# Patient Record
Sex: MALE | Race: WHITE | Employment: OTHER | URBAN - METROPOLITAN AREA
[De-identification: names, ages, dates, MRNs, and addresses within clinical notes are randomized per-mention and may not be internally consistent; named-entity substitution may affect disease eponyms.]

---

## 2017-01-16 ENCOUNTER — CLINICAL SUPPORT (OUTPATIENT)
Dept: FAMILY MEDICINE CLINIC | Age: 67
End: 2017-01-16

## 2017-01-16 DIAGNOSIS — J44.9 CHRONIC OBSTRUCTIVE PULMONARY DISEASE, UNSPECIFIED COPD TYPE (HCC): Primary | ICD-10-CM

## 2017-02-13 DIAGNOSIS — E11.9 TYPE 2 DIABETES MELLITUS WITHOUT COMPLICATION (HCC): ICD-10-CM

## 2017-02-13 RX ORDER — METFORMIN HYDROCHLORIDE 500 MG/1
TABLET, EXTENDED RELEASE ORAL
Qty: 90 TAB | Refills: 1 | Status: SHIPPED | OUTPATIENT
Start: 2017-02-13 | End: 2017-08-16 | Stop reason: SDUPTHER

## 2017-02-27 ENCOUNTER — TELEPHONE (OUTPATIENT)
Dept: FAMILY MEDICINE CLINIC | Age: 67
End: 2017-02-27

## 2017-02-27 NOTE — TELEPHONE ENCOUNTER
Telephone call made to the patient to let him know that his PAP order of Viagra has arrived and is available for pick-up. The patient stated he will pick it up soon.  Marge Manning RN

## 2017-03-17 ENCOUNTER — CLINICAL SUPPORT (OUTPATIENT)
Dept: FAMILY MEDICINE CLINIC | Age: 67
End: 2017-03-17

## 2017-03-17 DIAGNOSIS — Z79.899 MEDICATION MANAGEMENT: Primary | ICD-10-CM

## 2017-03-21 DIAGNOSIS — M62.830 BACK SPASM: ICD-10-CM

## 2017-03-21 DIAGNOSIS — E11.9 TYPE 2 DIABETES MELLITUS WITHOUT COMPLICATION, WITHOUT LONG-TERM CURRENT USE OF INSULIN (HCC): ICD-10-CM

## 2017-03-21 DIAGNOSIS — I10 ESSENTIAL HYPERTENSION WITH GOAL BLOOD PRESSURE LESS THAN 140/90: ICD-10-CM

## 2017-03-21 RX ORDER — CYCLOBENZAPRINE HCL 10 MG
TABLET ORAL
Qty: 30 TAB | Refills: 1 | Status: SHIPPED | OUTPATIENT
Start: 2017-03-21 | End: 2017-10-25 | Stop reason: ALTCHOICE

## 2017-03-21 RX ORDER — METOPROLOL TARTRATE 50 MG/1
TABLET ORAL
Qty: 180 TAB | Refills: 1 | Status: SHIPPED | OUTPATIENT
Start: 2017-03-21 | End: 2017-12-14 | Stop reason: SDUPTHER

## 2017-03-21 RX ORDER — LOSARTAN POTASSIUM 100 MG/1
TABLET ORAL
Qty: 90 TAB | Refills: 1 | Status: SHIPPED | OUTPATIENT
Start: 2017-03-21 | End: 2017-10-25 | Stop reason: DRUGHIGH

## 2017-03-21 RX ORDER — LOVASTATIN 20 MG/1
20 TABLET ORAL
Qty: 90 TAB | Refills: 1 | Status: SHIPPED | OUTPATIENT
Start: 2017-03-21 | End: 2017-08-16 | Stop reason: SDUPTHER

## 2017-03-30 ENCOUNTER — OFFICE VISIT (OUTPATIENT)
Dept: FAMILY MEDICINE CLINIC | Age: 67
End: 2017-03-30

## 2017-03-30 VITALS
DIASTOLIC BLOOD PRESSURE: 65 MMHG | BODY MASS INDEX: 37.43 KG/M2 | HEART RATE: 73 BPM | WEIGHT: 253.6 LBS | SYSTOLIC BLOOD PRESSURE: 116 MMHG | TEMPERATURE: 97.6 F

## 2017-03-30 DIAGNOSIS — L91.8 SKIN TAG: ICD-10-CM

## 2017-03-30 DIAGNOSIS — I10 ESSENTIAL HYPERTENSION WITH GOAL BLOOD PRESSURE LESS THAN 140/90: Primary | ICD-10-CM

## 2017-03-30 DIAGNOSIS — E11.9 TYPE 2 DIABETES MELLITUS WITHOUT COMPLICATION, WITHOUT LONG-TERM CURRENT USE OF INSULIN (HCC): ICD-10-CM

## 2017-03-30 DIAGNOSIS — J44.9 CHRONIC OBSTRUCTIVE PULMONARY DISEASE, UNSPECIFIED COPD TYPE (HCC): ICD-10-CM

## 2017-03-30 DIAGNOSIS — I10 ESSENTIAL HYPERTENSION: ICD-10-CM

## 2017-03-30 DIAGNOSIS — E03.4 HYPOTHYROIDISM DUE TO ACQUIRED ATROPHY OF THYROID: ICD-10-CM

## 2017-03-30 LAB — GLUCOSE POC: NORMAL MG/DL

## 2017-03-30 RX ORDER — HYDROCHLOROTHIAZIDE 25 MG/1
25 TABLET ORAL DAILY
Qty: 180 TAB | Refills: 1 | Status: SHIPPED | OUTPATIENT
Start: 2017-03-30 | End: 2017-10-25 | Stop reason: ALTCHOICE

## 2017-03-30 NOTE — MR AVS SNAPSHOT
Visit Information Date & Time Provider Department Dept. Phone Encounter #  
 3/30/2017  3:15 PM Brock Medina MD [facility] [phone] [id number] Follow-up Instructions Return in about 3 months (around 6/30/2017). Upcoming Health Maintenance Date Due COLONOSCOPY 2/19/1969 DTaP/Tdap/Td series (1 - Tdap) 2/19/1972 ZOSTER VACCINE AGE 60> 2/19/2011 EYE EXAM RETINAL OR DILATED Q1 7/1/2014 GLAUCOMA SCREENING Q2Y 2/19/2016 FOOT EXAM Q1 10/13/2016 MICROALBUMIN Q1 4/25/2017 LIPID PANEL Q1 4/25/2017 HEMOGLOBIN A1C Q6M 6/28/2017 MEDICARE YEARLY EXAM 8/11/2017 Allergies as of 3/30/2017  Review Complete On: 3/30/2017 By: Brock Medina MD  
  
 Severity Noted Reaction Type Reactions Bactrim [Sulfamethoprim Ds]  12/03/2015    Diarrhea, Nausea and Vomiting After on 1st dose. Morphine  08/15/2011    Unknown (comments) Current Immunizations  Reviewed on 12/28/2016 Name Date Influenza Vaccine 10/22/2014, 1/24/2014, 10/1/2012 Influenza Vaccine (Quad) PF 12/28/2016, 10/22/2015 Pneumococcal Polysaccharide (PPSV-23) 12/28/2016 Pneumococcal Vaccine (Unspecified Type) 1/1/2010 Not reviewed this visit You Were Diagnosed With   
  
 Codes Comments Essential hypertension with goal blood pressure less than 140/90    -  Primary ICD-10-CM: I10 
ICD-9-CM: 401.9 Chronic obstructive pulmonary disease, unspecified COPD type ([facility]     ICD-10-CM: J44.9 ICD-9-CM: 683 Essential hypertension     ICD-10-CM: I10 
ICD-9-CM: 401.9 Type 2 diabetes mellitus without complication, without long-term current use of insulin (HCC)     ICD-10-CM: E11.9 ICD-9-CM: 250.00 Hypothyroidism due to acquired atrophy of thyroid     ICD-10-CM: E03.4 ICD-9-CM: 244.8, 246.8 Vitals BP Pulse Temp Weight(growth percentile) BMI Smoking Status  116/65 (BP 1 Location: Left arm, BP Patient Position: Sitting) 73 97.6 °F (36.4 °C) (Oral) 253 lb 9.6 oz (115 kg) 37.43 kg/m2 Former Smoker Vitals History BMI and BSA Data Body Mass Index Body Surface Area  
 37.43 kg/m 2 2.37 m 2 Preferred Pharmacy Pharmacy Name Phone [facility] [address], [address] [facility] [phone] Your Updated Medication List  
  
   
This list is accurate as of: 3/30/17  4:08 PM.  Always use your most recent med list.  
  
  
  
  
 ADVAIR DISKUS 500-50 mcg/dose diskus inhaler Generic drug:  fluticasone-salmeterol Take 1 Puff by inhalation every twelve (12) hours. albuterol 2.5 mg /3 mL (0.083 %) nebulizer solution Commonly known as:  PROVENTIL VENTOLIN  
3 mL by Nebulization route every four (4) hours as needed for Wheezing. cyclobenzaprine 10 mg tablet Commonly known as:  FLEXERIL  
TAKE ONE TABLET BY MOUTH THREE TIMES A DAY AS NEEDED FOR MUSCLE SPASMS  
  
 finasteride 5 mg tablet Commonly known as:  PROSCAR  
TAKE ONE TABLET BY MOUTH DAILY  
  
 hydroCHLOROthiazide 25 mg tablet Commonly known as:  HYDRODIURIL Take 1 Tab by mouth daily. ipratropium 0.02 % nebulizer solution Commonly known as:  ATROVENT  
2.5 mL by Nebulization route four (4) times daily as needed for Wheezing (shortness of breath). levothyroxine 88 mcg tablet Commonly known as:  SYNTHROID  
TAKE ONE TABLET BY MOUTH DAILY BEFORE BREAKFAST  
  
 losartan 100 mg tablet Commonly known as:  COZAAR  
TAKE ONE TABLET BY MOUTH DAILY lovastatin 20 mg tablet Commonly known as:  MEVACOR Take 1 Tab by mouth nightly. metFORMIN  mg tablet Commonly known as:  GLUCOPHAGE XR  
TAKE ONE TABLET BY MOUTH DAILY  
  
 metoprolol tartrate 50 mg tablet Commonly known as:  LOPRESSOR  
TAKE ONE TABLET BY MOUTH TWICE A DAY  
  
 sildenafil citrate 100 mg tablet Commonly known as:  VIAGRA Take 1 Tab by mouth as needed. SPIRIVA WITH HANDIHALER 18 mcg inhalation capsule Generic drug:  tiotropium Take 1 Cap by inhalation daily. Prescriptions Sent to Pharmacy Refills  
 hydroCHLOROthiazide (HYDRODIURIL) 25 mg tablet 1 Sig: Take 1 Tab by mouth daily. Class: Normal  
 Pharmacy: [facility], [address] [place] [address] #: [phone] Route: Oral  
  
We Performed the Following AMB POC GLUCOSE BLOOD, BY GLUCOSE MONITORING DEVICE [25682 CPT(R)] Follow-up Instructions Return in about 3 months (around 6/30/2017). Introducing [facility] & HEALTH SERVICES! Romayne Duster introduces [employer] patient portal. Now you can access parts of your medical record, email your doctor's office, and request medication refills online. 1. In your internet browser, go to [email or web address] 2. Click on the First Time User? Click Here link in the Sign In box. You will see the New Member Sign Up page. 3. Enter your [employer] Access Code exactly as it appears below. You will not need to use this code after youve completed the sign-up process. If you do not sign up before the expiration date, you must request a new code. · [employer] Access Code: [email or web address] Expires: 4/16/2017  5:10 PM 
 
4. Enter the last four digits of your Social Security Number (xxxx) and Date of Birth (mm/dd/yyyy) as indicated and click Submit. You will be taken to the next sign-up page. 5. Create a F[employer]oo ID. This will be your F[employer]oo login ID and cannot be changed, so think of one that is secure and easy to remember. 6. Create a F[employer] password. You can change your password at any time. 7. Enter your Password Reset Question and Answer. This can be used at a later time if you forget your password. 8. Enter your e-mail address. You will receive e-mail notification when new information is available in [address] 9. Click Sign Up. You can now view and download portions of your medical record. 10. Click the Download Summary menu link to download a portable copy of your medical information. If you have questions, please visit the Frequently Asked Questions section of the mPortal website. Remember, mPortal is NOT to be used for urgent needs. For medical emergencies, dial 911. Now available from your iPhone and Android! Please provide this summary of care documentation to your next provider. Your primary care clinician is listed as Kiera Lewis. If you have any questions after today's visit, please call [phone].

## 2017-03-30 NOTE — PROGRESS NOTES
Coordination of Care  1. Have you been to the ER, urgent care clinic since your last visit? Hospitalized since your last visit? No    2. Have you seen or consulted any other health care providers outside of the [address] [place] [address] since your last visit? Include any pap smears or colon screening.  No    Medications  Medication Reconciliation Performed: no  Patient does need refills     Learning Assessment Complete? yes  Results for orders placed or performed in visit on 03/30/17   AMB POC GLUCOSE BLOOD, BY GLUCOSE MONITORING DEVICE   Result Value Ref Range    Glucose  Non-fasting mg/dL

## 2017-03-30 NOTE — PROGRESS NOTES
Subjective:     Jj Mccabe is a 78 y.o. male who presents for follow up of diabetes, hypertension, and COPD. Continues to have chronic intermittent thoracic midline pain between scapulae for a long time. Does not take anything. Goes away at times. OTC meds have not helped in past.  He is not bothered by this now. His breathing is doing about the same. Not so bad lately. Has most trouble in summer. Noted a skin tag on left temple lately that he squeezed. It almost seemed to come off. Patient Active Problem List   Diagnosis Code    Obstructive sleep apnea (adult) (pediatric) G47.33    COPD (chronic obstructive pulmonary disease) ([facility] J44.9    History of ETOH abuse Z87.898    Thrombocytopenia ([facility] D69.6    Lower urinary tract symptoms (LUTS) R39.9    Hypothyroidism due to acquired atrophy of thyroid E03.4    Essential hypertension I10    Type 2 diabetes mellitus without complication, without long-term current use of insulin (HCC) E11.9       Current Outpatient Prescriptions   Medication Sig    cyclobenzaprine (FLEXERIL) 10 mg tablet TAKE ONE TABLET BY MOUTH THREE TIMES A DAY AS NEEDED FOR MUSCLE SPASMS    losartan (COZAAR) 100 mg tablet TAKE ONE TABLET BY MOUTH DAILY    lovastatin (MEVACOR) 20 mg tablet Take 1 Tab by mouth nightly.  metoprolol tartrate (LOPRESSOR) 50 mg tablet TAKE ONE TABLET BY MOUTH TWICE A DAY    metFORMIN ER (GLUCOPHAGE XR) 500 mg tablet TAKE ONE TABLET BY MOUTH DAILY    albuterol (PROVENTIL VENTOLIN) 2.5 mg /3 mL (0.083 %) nebulizer solution 3 mL by Nebulization route every four (4) hours as needed for Wheezing.  ipratropium (ATROVENT) 0.02 % nebulizer solution 2.5 mL by Nebulization route four (4) times daily as needed for Wheezing (shortness of breath).  hydrochlorothiazide (HYDRODIURIL) 25 mg tablet Take 1 Tab by mouth daily.  ibuprofen (MOTRIN) 600 mg tablet Take 1 Tab by mouth every eight (8) hours as needed for Pain.     finasteride (PROSCAR) 5 mg tablet TAKE ONE TABLET BY MOUTH DAILY    levothyroxine (SYNTHROID) 88 mcg tablet TAKE ONE TABLET BY MOUTH DAILY BEFORE BREAKFAST    sildenafil citrate (VIAGRA) 100 mg tablet Take 1 Tab by mouth as needed.  fluticasone-salmeterol (ADVAIR DISKUS) 500-50 mcg/dose diskus inhaler Take 1 Puff by inhalation every twelve (12) hours.  tiotropium (SPIRIVA WITH HANDIHALER) 18 mcg inhalation capsule Take 1 Cap by inhalation daily. No current facility-administered medications for this visit. Allergies   Allergen Reactions    Bactrim [Sulfamethoprim Ds] Diarrhea and Nausea and Vomiting     After on 1st dose.  Morphine Unknown (comments)       Diet and Lifestyle: follows a diabetic diet regularly      Glucoses:  Not checking    BP readings outside office:  Not checking    Cardiovascular ROS: taking medications as instructed, no medication side effects noted, no TIA's, no chest pain on exertion, notes stable dyspnea on exertion, no change, no swelling of ankles. Review of Systems, additional:  Pertinent items are noted in HPI.       Past Medical History:   Diagnosis Date    Blurred vision     Cirrhosis, alcoholic ([address]     COPD bronchitis     Diabetes mellitus ([address]     Ear discomfort     History of ETOH abuse     HTN (hypertension)     SOB (shortness of breath)     Thrombocytopenia (HCC)     Trouble in sleeping      Past Surgical History:   Procedure Laterality Date    HX CATARACT REMOVAL Right     HX CATARACT REMOVAL Left     HX CIRCUMCISION      at 26 y/o    HX HERNIA REPAIR  as a youth    right side    HX SHOULDER ARTHROSCOPY      left x 3 for torn rotator cuff muscle     Family History   Problem Relation Age of Onset    Diabetes Neg Hx     Hypertension Father     Heart Disease Father      pacemaker,  of MI @ 80    Hypertension Mother     Cancer Mother      breast with spread to bones     Social History   Substance Use Topics    Smoking status: Former Smoker     Types: Cigarettes     Quit date: 11/2/2014    Smokeless tobacco: Former User    Alcohol use No      Comment: Quit Oct 21, 1985          Objective:     Visit Vitals    /65 (BP 1 Location: Left arm, BP Patient Position: Sitting)    Pulse 73    Temp 97.6 °F (36.4 °C) (Oral)    Wt 253 lb 9.6 oz (115 kg)    BMI 37.43 kg/m2     Body mass index is 37.43 kg/(m^2). Physical Exam  General appearance: alert, cooperative, no distress, appears stated age    Lungs: diffuse wheezes, no rales no increased work of breathing  Heart: regular rate and rhythm, S1, S2 normal, no murmur, click, rub or gallop  Extremities: !+ edema of both lower legs. Skin: there is a necrotic skin tag on left temple in the hair which seems nearly ready to fall off. Neurologic: Alert and oriented, grossly normal exam. Uses a cane to walk. Lab results below reviewed at this visit:  Results for orders placed or performed in visit on 03/30/17   AMB POC GLUCOSE BLOOD, BY GLUCOSE MONITORING DEVICE   Result Value Ref Range    Glucose  Non-fasting mg/dL       Lab Results   Component Value Date/Time    Hemoglobin A1c 6.0 12/28/2016 11:56 AM       Lab Results   Component Value Date/Time    Sodium 142 04/25/2016 12:19 PM    Potassium 3.4 04/25/2016 12:19 PM    Chloride 101 04/25/2016 12:19 PM    CO2 24 04/25/2016 12:19 PM    Anion gap 6 04/15/2013 09:28 AM    Glucose 108 04/25/2016 12:19 PM    BUN 13 04/25/2016 12:19 PM    Creatinine 1.03 04/25/2016 12:19 PM    BUN/Creatinine ratio 13 04/25/2016 12:19 PM    GFR est AA 88 04/25/2016 12:19 PM    GFR est non-AA 76 04/25/2016 12:19 PM    Calcium 9.4 04/25/2016 12:19 PM       Lab Results   Component Value Date/Time    Microalbumin/Creat ratio (mg/g creat)  10/22/2013 02:16 PM     Cannot calculate ratio due to micro albumin result outside reportable range.     Microalbumin,urine random <0.50 10/22/2013 02:16 PM    Microalbumin, urine <3.0 04/25/2016 12:19 PM       Lab Results Component Value Date/Time    Cholesterol, total 108 04/25/2016 12:19 PM    HDL Cholesterol 49 04/25/2016 12:19 PM    LDL,Direct 73 04/15/2013 09:28 AM    LDL, calculated 42 04/25/2016 12:19 PM    VLDL, calculated 17 04/25/2016 12:19 PM    Triglyceride 86 04/25/2016 12:19 PM    CHOL/HDL Ratio 3.3 07/18/2012 10:00 AM         Assessment/Plan:       ICD-10-CM ICD-9-CM    1. Essential hypertension with goal blood pressure less than 140/90 I10 401.9 hydroCHLOROthiazide (HYDRODIURIL) 25 mg tablet   2. Chronic obstructive pulmonary disease, unspecified COPD type ([facility] J44.9 496    3. Skin tag L91.8 701.9    4. Essential hypertension I10 401.9    5. Type 2 diabetes mellitus without complication, without long-term current use of insulin (HCC) E11.9 250.00 AMB POC GLUCOSE BLOOD, BY GLUCOSE MONITORING DEVICE   6. Hypothyroidism due to acquired atrophy of thyroid E03.4 244.8      246.8        Continue same medications for now. Refill needed medication. Return for freezing of left temple skin tag if it does not fully resolve. Recommend he try compression socks for his edema. Follow-up Disposition:  Return in about 3 months (around 6/30/2017).

## 2017-04-24 ENCOUNTER — CLINICAL SUPPORT (OUTPATIENT)
Dept: FAMILY MEDICINE CLINIC | Age: 67
End: 2017-04-24

## 2017-04-24 DIAGNOSIS — Z71.9 COUNSELED BY NURSE: Primary | ICD-10-CM

## 2017-04-24 NOTE — PROGRESS NOTES
Patient seen for a Nurse Visit for PAP medication pick-up. He signed for his 3 Advair and correctly stated the dose as 1 puff every 12 hours. The patient also understands to call [facility], [facility], to request refills for the Advair.  Alyssia Guerra RN

## 2017-06-01 ENCOUNTER — CLINICAL SUPPORT (OUTPATIENT)
Dept: FAMILY MEDICINE CLINIC | Age: 67
End: 2017-06-01

## 2017-06-01 DIAGNOSIS — E11.9 TYPE 2 DIABETES MELLITUS WITHOUT COMPLICATION, WITHOUT LONG-TERM CURRENT USE OF INSULIN (HCC): ICD-10-CM

## 2017-06-01 DIAGNOSIS — J44.9 CHRONIC OBSTRUCTIVE PULMONARY DISEASE, UNSPECIFIED COPD TYPE (HCC): Primary | ICD-10-CM

## 2017-06-21 ENCOUNTER — OFFICE VISIT (OUTPATIENT)
Dept: FAMILY MEDICINE CLINIC | Age: 67
End: 2017-06-21

## 2017-06-21 VITALS
SYSTOLIC BLOOD PRESSURE: 120 MMHG | WEIGHT: 256 LBS | HEART RATE: 77 BPM | TEMPERATURE: 98.6 F | DIASTOLIC BLOOD PRESSURE: 70 MMHG | BODY MASS INDEX: 37.79 KG/M2

## 2017-06-21 DIAGNOSIS — Z11.59 NEED FOR HEPATITIS C SCREENING TEST: ICD-10-CM

## 2017-06-21 DIAGNOSIS — I10 ESSENTIAL HYPERTENSION: ICD-10-CM

## 2017-06-21 DIAGNOSIS — M54.6 CHRONIC MIDLINE THORACIC BACK PAIN: ICD-10-CM

## 2017-06-21 DIAGNOSIS — E11.9 TYPE 2 DIABETES MELLITUS WITHOUT COMPLICATION, WITHOUT LONG-TERM CURRENT USE OF INSULIN (HCC): Primary | ICD-10-CM

## 2017-06-21 DIAGNOSIS — G47.33 OBSTRUCTIVE SLEEP APNEA (ADULT) (PEDIATRIC): ICD-10-CM

## 2017-06-21 DIAGNOSIS — G89.29 CHRONIC MIDLINE THORACIC BACK PAIN: ICD-10-CM

## 2017-06-21 LAB — GLUCOSE POC: NORMAL MG/DL

## 2017-06-21 NOTE — MR AVS SNAPSHOT
Visit Information Date & Time Provider Department Dept. Phone Encounter #  
 6/21/2017  1:15 PM Ashlee Torres MD [facility] [phone] [id number] Follow-up Instructions Return in about 3 months (around 9/21/2017). Upcoming Health Maintenance Date Due COLONOSCOPY 2/19/1969 DTaP/Tdap/Td series (1 - Tdap) 2/19/1972 ZOSTER VACCINE AGE 60> 2/19/2011 EYE EXAM RETINAL OR DILATED Q1 7/1/2014 GLAUCOMA SCREENING Q2Y 2/19/2016 FOOT EXAM Q1 10/13/2016 MICROALBUMIN Q1 4/25/2017 LIPID PANEL Q1 4/25/2017 HEMOGLOBIN A1C Q6M 6/28/2017 INFLUENZA AGE 9 TO ADULT 8/1/2017 MEDICARE YEARLY EXAM 8/11/2017 Allergies as of 6/21/2017  Review Complete On: 6/21/2017 By: Ashlee Torres MD  
  
 Severity Noted Reaction Type Reactions Bactrim [Sulfamethoprim Ds]  12/03/2015    Diarrhea, Nausea and Vomiting After on 1st dose. Morphine  08/15/2011    Unknown (comments) Current Immunizations  Reviewed on 12/28/2016 Name Date Influenza Vaccine 10/22/2014, 1/24/2014, 10/1/2012 Influenza Vaccine (Quad) PF 12/28/2016, 10/22/2015 Pneumococcal Polysaccharide (PPSV-23) 12/28/2016 Pneumococcal Vaccine (Unspecified Type) 1/1/2010 Not reviewed this visit You Were Diagnosed With   
  
 Codes Comments Type 2 diabetes mellitus without complication, without long-term current use of insulin (HCC)    -  Primary ICD-10-CM: E11.9 ICD-9-CM: 250.00 Essential hypertension     ICD-10-CM: I10 
ICD-9-CM: 401.9 Chronic midline thoracic back pain     ICD-10-CM: M54.6, G89.29 ICD-9-CM: 724.1, 338.29 Obstructive sleep apnea (adult) (pediatric)     ICD-10-CM: G47.33 
ICD-9-CM: 327.23 Need for hepatitis C screening test     ICD-10-CM: Z11.59 
ICD-9-CM: V73.89 Vitals BP Pulse Temp Weight(growth percentile) BMI Smoking Status  120/70 (BP 1 Location: Left arm, BP Patient Position: Sitting) 77 98.6 °F (37 °C) (Oral) 256 lb (116.1 kg) 37.79 kg/m2 Former Smoker Vitals History BMI and BSA Data Body Mass Index Body Surface Area  
 37.79 kg/m 2 2.38 m 2 Preferred Pharmacy Pharmacy Name Phone [facility] [address] [facility], [address] [place] [address] [phone] Your Updated Medication List  
  
   
This list is accurate as of: 6/21/17  2:04 PM.  Always use your most recent med list.  
  
  
  
  
 ADVAIR DISKUS 500-50 mcg/dose diskus inhaler Generic drug:  fluticasone-salmeterol Take 1 Puff by inhalation every twelve (12) hours. albuterol 2.5 mg /3 mL (0.083 %) nebulizer solution Commonly known as:  PROVENTIL VENTOLIN  
3 mL by Nebulization route every four (4) hours as needed for Wheezing. cyclobenzaprine 10 mg tablet Commonly known as:  FLEXERIL  
TAKE ONE TABLET BY MOUTH THREE TIMES A DAY AS NEEDED FOR MUSCLE SPASMS  
  
 finasteride 5 mg tablet Commonly known as:  PROSCAR  
TAKE ONE TABLET BY MOUTH DAILY  
  
 hydroCHLOROthiazide 25 mg tablet Commonly known as:  HYDRODIURIL Take 1 Tab by mouth daily. ipratropium 0.02 % nebulizer solution Commonly known as:  ATROVENT  
2.5 mL by Nebulization route four (4) times daily as needed for Wheezing (shortness of breath). levothyroxine 88 mcg tablet Commonly known as:  SYNTHROID  
TAKE ONE TABLET BY MOUTH EVERY MORNING BEFORE BREAKFAST  
  
 losartan 100 mg tablet Commonly known as:  COZAAR  
TAKE ONE TABLET BY MOUTH DAILY lovastatin 20 mg tablet Commonly known as:  MEVACOR Take 1 Tab by mouth nightly. metFORMIN  mg tablet Commonly known as:  GLUCOPHAGE XR  
TAKE ONE TABLET BY MOUTH DAILY  
  
 metoprolol tartrate 50 mg tablet Commonly known as:  LOPRESSOR  
TAKE ONE TABLET BY MOUTH TWICE A DAY  
  
 sildenafil citrate 100 mg tablet Commonly known as:  VIAGRA Take 1 Tab by mouth as needed. SPIRIVA WITH HANDIHALER 18 mcg inhalation capsule Generic drug:  tiotropium Take 1 Cap by inhalation daily. We Performed the Following AMB POC GLUCOSE BLOOD, BY GLUCOSE MONITORING DEVICE [19994 CPT(R)] HEMOGLOBIN A1C WITH EAG [73652 CPT(R)] HEPATITIS C AB [24330 CPT(R)] METABOLIC PANEL, BASIC [80666 CPT(R)] MICROALBUMIN, UR, RAND W/ MICROALBUMIN/CREA RATIO [id number] CPT(R)] Follow-up Instructions Return in about 3 months (around 9/21/2017). To-Do List   
 06/23/2017 Imaging:  XR SPINE THORAC 2 V Introducing [facility] & HEALTH SERVICES! Sage Crouch introduces [employer] patient portal. Now you can access parts of your medical record, email your doctor's office, and request medication refills online. 1. In your internet browser, go to [email or web address] 2. Click on the First Time User? Click Here link in the Sign In box. You will see the New Member Sign Up page. 3. Enter your [employer] Access Code exactly as it appears below. You will not need to use this code after youve completed the sign-up process. If you do not sign up before the expiration date, you must request a new code. · [employer] Access Code: [email or web address] Expires: 9/19/2017  2:03 PM 
 
4. Enter the last four digits of your Social Security Number (xxxx) and Date of Birth (mm/dd/yyyy) as indicated and click Submit. You will be taken to the next sign-up page. 5. Create a [employer] ID. This will be your [employer] login ID and cannot be changed, so think of one that is secure and easy to remember. 6. Create a [employer] password. You can change your password at any time. 7. Enter your Password Reset Question and Answer. This can be used at a later time if you forget your password. 8. Enter your e-mail address. You will receive e-mail notification when new information is available in [address] 9. Click Sign Up. You can now view and download portions of your medical record. 10. Click the Download Summary menu link to download a portable copy of your medical information. If you have questions, please visit the Frequently Asked Questions section of the [employer] website. Remember, [employer] is NOT to be used for urgent needs. For medical emergencies, dial 911. Now available from your iPhone and Android! Please provide this summary of care documentation to your next provider. Your primary care clinician is listed as Mila Edwards. If you have any questions after today's visit, please call [phone].

## 2017-06-21 NOTE — PROGRESS NOTES
Subjective:     Francy Mcnally is a 78 y.o. male who presents for follow up of diabetes, hypertension, and COPD. His left temple lesion resolved. Has some other benign lesions on face which I told him we could freeze. Is upset about the ugliness of his easy bruising of skin on arms. Not on aspirin. Having a lot of pain in mid thoracic back lately. Helps him put pressure on the area. Also having cramps in both feet in early AM.  Happens frequently but not every night. Wearing his sneakers seems to help. His wife found out she had hepatitis C and she wants him to be checked. Last eye exam was about 1 year ago. Uses his CPAP machine every night which he feels helps him. Patient Active Problem List   Diagnosis Code    Obstructive sleep apnea (adult) (pediatric) G47.33    COPD (chronic obstructive pulmonary disease) ([facility] J44.9    History of ETOH abuse Z87.898    Thrombocytopenia ([facility] D69.6    Lower urinary tract symptoms (LUTS) R39.9    Hypothyroidism due to acquired atrophy of thyroid E03.4    Essential hypertension I10    Type 2 diabetes mellitus without complication, without long-term current use of insulin (HCC) E11.9       Current Outpatient Prescriptions   Medication Sig    levothyroxine (SYNTHROID) 88 mcg tablet TAKE ONE TABLET BY MOUTH EVERY MORNING BEFORE BREAKFAST    hydroCHLOROthiazide (HYDRODIURIL) 25 mg tablet Take 1 Tab by mouth daily.  cyclobenzaprine (FLEXERIL) 10 mg tablet TAKE ONE TABLET BY MOUTH THREE TIMES A DAY AS NEEDED FOR MUSCLE SPASMS    losartan (COZAAR) 100 mg tablet TAKE ONE TABLET BY MOUTH DAILY    lovastatin (MEVACOR) 20 mg tablet Take 1 Tab by mouth nightly.     metoprolol tartrate (LOPRESSOR) 50 mg tablet TAKE ONE TABLET BY MOUTH TWICE A DAY    metFORMIN ER (GLUCOPHAGE XR) 500 mg tablet TAKE ONE TABLET BY MOUTH DAILY    albuterol (PROVENTIL VENTOLIN) 2.5 mg /3 mL (0.083 %) nebulizer solution 3 mL by Nebulization route every four (4) hours as needed for Wheezing.  ipratropium (ATROVENT) 0.02 % nebulizer solution 2.5 mL by Nebulization route four (4) times daily as needed for Wheezing (shortness of breath).  finasteride (PROSCAR) 5 mg tablet TAKE ONE TABLET BY MOUTH DAILY    sildenafil citrate (VIAGRA) 100 mg tablet Take 1 Tab by mouth as needed.  fluticasone-salmeterol (ADVAIR DISKUS) 500-50 mcg/dose diskus inhaler Take 1 Puff by inhalation every twelve (12) hours.  tiotropium (SPIRIVA WITH HANDIHALER) 18 mcg inhalation capsule Take 1 Cap by inhalation daily. No current facility-administered medications for this visit. Allergies   Allergen Reactions    Bactrim [Sulfamethoprim Ds] Diarrhea and Nausea and Vomiting     After on 1st dose.  Morphine Unknown (comments)       Diet and Lifestyle: does not rigorously follow a diabetic diet      Glucoses:  Not checking    BP readings outside office:  Not checked. Cardiovascular ROS: taking medications as instructed, no medication side effects noted, no TIA's, no chest pain on exertion, notes stable dyspnea on exertion, no change, noting swelling of ankles. Review of Systems, additional:  Pertinent items are noted in HPI.       Past Surgical History:   Procedure Laterality Date    HX CATARACT REMOVAL Right 2007    HX CATARACT REMOVAL Left 2012    HX CIRCUMCISION      at 26 y/o    HX HERNIA REPAIR  as a youth    right side    HX SHOULDER ARTHROSCOPY      left x 3 for torn rotator cuff muscle     Family History   Problem Relation Age of Onset    Diabetes Neg Hx     Hypertension Father     Heart Disease Father      pacemaker,  of MI @ 80    Hypertension Mother     Cancer Mother      breast with spread to bones     Social History   Substance Use Topics    Smoking status: Former Smoker     Types: Cigarettes     Quit date: 2014    Smokeless tobacco: Former User    Alcohol use No      Comment: Quit Oct 21, 1985          Objective:     Visit Vitals    BP 120/70 (BP 1 Location: Left arm, BP Patient Position: Sitting)    Pulse 77    Temp 98.6 °F (37 °C) (Oral)    Wt 256 lb (116.1 kg)    BMI 37.79 kg/m2     Body mass index is 37.79 kg/(m^2). Physical Exam  General appearance: alert, cooperative, no distress, appears stated age    Lungs: clear to auscultation bilaterally, no wheezes, no increased work of breathing  Heart: regular rate and rhythm, S1, S2 normal, no murmur, click, rub or gallop  Extremities: extremities normal, 1+ edema of both lower legs. Back: no deformity, no spine tenderness. Skin: typical areas of superficial ecchymosis on forearms only. Skin is thin and fragile. Neurologic: Alert and oriented, grossly normal exam. Normal coordination and gait      Lab results below reviewed at this visit:  Results for orders placed or performed in visit on 06/21/17   AMB POC GLUCOSE BLOOD, BY GLUCOSE MONITORING DEVICE   Result Value Ref Range    Glucose  non fasting mg/dL   Ate potato chips before this. Lab Results   Component Value Date/Time    Hemoglobin A1c 6.0 12/28/2016 11:56 AM       Lab Results   Component Value Date/Time    Sodium 142 04/25/2016 12:19 PM    Potassium 3.4 04/25/2016 12:19 PM    Chloride 101 04/25/2016 12:19 PM    CO2 24 04/25/2016 12:19 PM    Anion gap 6 04/15/2013 09:28 AM    Glucose 108 04/25/2016 12:19 PM    BUN 13 04/25/2016 12:19 PM    Creatinine 1.03 04/25/2016 12:19 PM    BUN/Creatinine ratio 13 04/25/2016 12:19 PM    GFR est AA 88 04/25/2016 12:19 PM    GFR est non-AA 76 04/25/2016 12:19 PM    Calcium 9.4 04/25/2016 12:19 PM       Lab Results   Component Value Date/Time    Microalbumin/Creat ratio (mg/g creat)  10/22/2013 02:16 PM     Cannot calculate ratio due to micro albumin result outside reportable range.     Microalbumin,urine random <0.50 10/22/2013 02:16 PM       Lab Results   Component Value Date/Time    Cholesterol, total 108 04/25/2016 12:19 PM    HDL Cholesterol 49 04/25/2016 12:19 PM    LDL,Direct 73 04/15/2013 09:28 AM    LDL, calculated 42 04/25/2016 12:19 PM    VLDL, calculated 17 04/25/2016 12:19 PM    Triglyceride 86 04/25/2016 12:19 PM    CHOL/HDL Ratio 3.3 07/18/2012 10:00 AM         Assessment/Plan:       ICD-10-CM ICD-9-CM    1. Type 2 diabetes mellitus without complication, without long-term current use of insulin (HCC) E11.9 250.00 AMB POC GLUCOSE BLOOD, BY GLUCOSE MONITORING DEVICE      HEMOGLOBIN A1C WITH EAG      MICROALBUMIN, UR, RAND W/ MICROALBUMIN/CREA RATIO   2. Essential hypertension L29 500.8 METABOLIC PANEL, BASIC   3. Chronic midline thoracic back pain M54.6 724.1 XR SPINE THORAC 2 V    G89.29 338.29    4. Obstructive sleep apnea (adult) (pediatric) G47.33 327.23    5. Need for hepatitis C screening test Z11.59 V73.89 HEPATITIS C AB       Check lab work including his hepatitis C Ab. Call regarding results as needed. Suggested he try taking an MVI for skin. Discussed trying to get some more exercise and reduce his calories in effort to decrease weight and increase stamina. Because of his age and the somewhat atypical back pain I have asked him to obtain a thoracic spine x-ray. He is somewhat reluctant to get this done but I strongly encouraged him to have it done. Recommend he go back to his eye doctor for a diabetic exam.    Follow-up Disposition:  Return in about 3 months (around 9/21/2017).

## 2017-06-21 NOTE — PROGRESS NOTES
Coordination of Care  1. Have you been to the ER, urgent care clinic since your last visit? Hospitalized since your last visit? No    2. Have you seen or consulted any other health care providers outside of the [address] [place] [address] since your last visit? Include any pap smears or colon screening.  No    Medications  Medication Reconciliation Performed: no  Patient does not know need refills     Learning Assessment Complete? yes     Results for orders placed or performed in visit on 06/21/17   AMB POC GLUCOSE BLOOD, BY GLUCOSE MONITORING DEVICE   Result Value Ref Range    Glucose  non fasting mg/dL

## 2017-06-22 LAB
ALBUMIN/CREAT UR: <4.6 MG/G CREAT (ref 0–30)
BUN SERPL-MCNC: 16 MG/DL (ref 8–27)
BUN/CREAT SERPL: 15 (ref 10–24)
CALCIUM SERPL-MCNC: 9.5 MG/DL (ref 8.6–10.2)
CHLORIDE SERPL-SCNC: 101 MMOL/L (ref 96–106)
CO2 SERPL-SCNC: 26 MMOL/L (ref 18–29)
CREAT SERPL-MCNC: 1.07 MG/DL (ref 0.76–1.27)
CREAT UR-MCNC: 65.7 MG/DL
EST. AVERAGE GLUCOSE BLD GHB EST-MCNC: 111 MG/DL
GLUCOSE SERPL-MCNC: 108 MG/DL (ref 65–99)
HBA1C MFR BLD: 5.5 % (ref 4.8–5.6)
HCV AB S/CO SERPL IA: <0.1 S/CO RATIO (ref 0–0.9)
MICROALBUMIN UR-MCNC: <3 UG/ML
POTASSIUM SERPL-SCNC: 4 MMOL/L (ref 3.5–5.2)
SODIUM SERPL-SCNC: 141 MMOL/L (ref 134–144)

## 2017-06-22 NOTE — PROGRESS NOTES
Please inform him that his hepatitis C test is negative, his diabetes is very well controlled, and the rest of his blood work is normal.

## 2017-07-17 RX ORDER — FINASTERIDE 5 MG/1
TABLET, FILM COATED ORAL
Qty: 90 TAB | Refills: 3 | Status: SHIPPED | OUTPATIENT
Start: 2017-07-17 | End: 2017-10-25

## 2017-07-21 ENCOUNTER — HOSPITAL ENCOUNTER (OUTPATIENT)
Dept: GENERAL RADIOLOGY | Age: 67
Discharge: HOME | End: 2017-07-21
Payer: MEDICARE

## 2017-07-21 DIAGNOSIS — G89.29 CHRONIC MIDLINE THORACIC BACK PAIN: ICD-10-CM

## 2017-07-21 DIAGNOSIS — M54.6 CHRONIC MIDLINE THORACIC BACK PAIN: ICD-10-CM

## 2017-07-21 PROCEDURE — 72072 X-RAY EXAM THORAC SPINE 3VWS: CPT

## 2017-08-03 DIAGNOSIS — E03.4 HYPOTHYROIDISM DUE TO ACQUIRED ATROPHY OF THYROID: ICD-10-CM

## 2017-08-03 RX ORDER — LEVOTHYROXINE SODIUM 88 UG/1
TABLET ORAL
Qty: 30 TAB | Refills: 3 | Status: SHIPPED | OUTPATIENT
Start: 2017-08-03 | End: 2017-10-25 | Stop reason: SDUPTHER

## 2017-08-16 ENCOUNTER — OFFICE VISIT (OUTPATIENT)
Dept: FAMILY MEDICINE CLINIC | Age: 67
End: 2017-08-16

## 2017-08-16 VITALS
OXYGEN SATURATION: 96 % | DIASTOLIC BLOOD PRESSURE: 66 MMHG | WEIGHT: 252 LBS | HEART RATE: 70 BPM | TEMPERATURE: 98.2 F | HEIGHT: 69 IN | SYSTOLIC BLOOD PRESSURE: 129 MMHG | BODY MASS INDEX: 37.33 KG/M2

## 2017-08-16 DIAGNOSIS — G89.29 CHRONIC LEFT-SIDED THORACIC BACK PAIN: Primary | ICD-10-CM

## 2017-08-16 DIAGNOSIS — E11.9 TYPE 2 DIABETES MELLITUS WITHOUT COMPLICATION, WITHOUT LONG-TERM CURRENT USE OF INSULIN (HCC): ICD-10-CM

## 2017-08-16 DIAGNOSIS — M54.6 CHRONIC LEFT-SIDED THORACIC BACK PAIN: Primary | ICD-10-CM

## 2017-08-16 DIAGNOSIS — I10 ESSENTIAL HYPERTENSION: ICD-10-CM

## 2017-08-16 LAB — GLUCOSE POC: NORMAL MG/DL

## 2017-08-16 RX ORDER — METFORMIN HYDROCHLORIDE 500 MG/1
TABLET, EXTENDED RELEASE ORAL
Qty: 90 TAB | Refills: 0 | OUTPATIENT
Start: 2017-08-16

## 2017-08-16 RX ORDER — METFORMIN HYDROCHLORIDE 500 MG/1
TABLET, EXTENDED RELEASE ORAL
Qty: 90 TAB | Refills: 1 | Status: SHIPPED | OUTPATIENT
Start: 2017-08-16 | End: 2017-10-25 | Stop reason: SDUPTHER

## 2017-08-16 RX ORDER — LOVASTATIN 20 MG/1
20 TABLET ORAL
Qty: 90 TAB | Refills: 1 | Status: SHIPPED | OUTPATIENT
Start: 2017-08-16 | End: 2017-10-25 | Stop reason: SDUPTHER

## 2017-08-16 NOTE — PROGRESS NOTES
Subjective:   Jam Maurice is a 78 y.o. male with history of T2DM, HTN and COPD who presents to the clinic for chronic back pain. History provided by patient    HPI: Pt states that pain started 3 years ago. Describes pain as pressure/sharp, aggravated by movement, located in mid back and to the left side. When present, back pain is 10/10 and lasts for \"2 months. \" Currently doesn't have pain, last episode of pain was 2 weeks ago. Denied relief with NSAIDs, ICE and warm compresses. Pt states that he eats candy and brownies but his blood sugar has been in target range. No other complaints at this time. HM: Due for colonoscopy, Eye exam and Foot exam.    Current Outpatient Prescriptions on File Prior to Visit   Medication Sig Dispense Refill    levothyroxine (SYNTHROID) 88 mcg tablet TAKE ONE TABLET BY MOUTH EVERY MORNING BEFORE BREAKFAST 30 Tab 3    finasteride (PROSCAR) 5 mg tablet TAKE ONE TABLET BY MOUTH DAILY 90 Tab 3    hydroCHLOROthiazide (HYDRODIURIL) 25 mg tablet Take 1 Tab by mouth daily. 180 Tab 1    cyclobenzaprine (FLEXERIL) 10 mg tablet TAKE ONE TABLET BY MOUTH THREE TIMES A DAY AS NEEDED FOR MUSCLE SPASMS 30 Tab 1    losartan (COZAAR) 100 mg tablet TAKE ONE TABLET BY MOUTH DAILY 90 Tab 1    lovastatin (MEVACOR) 20 mg tablet Take 1 Tab by mouth nightly. 90 Tab 1    metoprolol tartrate (LOPRESSOR) 50 mg tablet TAKE ONE TABLET BY MOUTH TWICE A  Tab 1    metFORMIN ER (GLUCOPHAGE XR) 500 mg tablet TAKE ONE TABLET BY MOUTH DAILY 90 Tab 1    albuterol (PROVENTIL VENTOLIN) 2.5 mg /3 mL (0.083 %) nebulizer solution 3 mL by Nebulization route every four (4) hours as needed for Wheezing. 180 Each 5    ipratropium (ATROVENT) 0.02 % nebulizer solution 2.5 mL by Nebulization route four (4) times daily as needed for Wheezing (shortness of breath). 180 mL 5    sildenafil citrate (VIAGRA) 100 mg tablet Take 1 Tab by mouth as needed.  30 Tab 0    fluticasone-salmeterol (ADVAIR DISKUS) 500-50 mcg/dose diskus inhaler Take 1 Puff by inhalation every twelve (12) hours.  tiotropium (SPIRIVA WITH HANDIHALER) 18 mcg inhalation capsule Take 1 Cap by inhalation daily. No current facility-administered medications on file prior to visit. Patient Active Problem List   Diagnosis Code    Obstructive sleep apnea (adult) (pediatric) G47.33    COPD (chronic obstructive pulmonary disease) ([facility] [address] J44.9    History of ETOH abuse Z87.898    [facility] D69.6    Lower urinary tract symptoms (LUTS) R39.9    Hypothyroidism due to acquired atrophy of thyroid E03.4    Essential hypertension I10    Type 2 diabetes mellitus without complication, without long-term current use of insulin (HCC) E11.9       Social History     Social History    Marital status:      Spouse name: N/A    Number of children: N/A    Years of education: N/A     Occupational History    Not on file. Social History Main Topics    Smoking status: Former Smoker     Types: Cigarettes     Quit date: 11/2/2014    Smokeless tobacco: Former User    Alcohol use No      Comment: Quit Oct 21, 1985    Drug use: No    Sexual activity: Not on file     Other Topics Concern    Not on file     Social History Narrative       Review of Systems   Constitutional: Negative for chills and fever. Respiratory: Negative for cough and shortness of breath. Cardiovascular: Negative for chest pain and palpitations. Gastrointestinal: Negative for abdominal pain, diarrhea, nausea and vomiting. Musculoskeletal: Positive for back pain. Objective:     Visit Vitals    /66 (BP 1 Location: Right arm, BP Patient Position: Sitting)    Pulse 70    Temp 98.2 °F (36.8 °C) (Oral)    Ht 5' 9\" (1.753 m)    Wt 252 lb (114.3 kg)    SpO2 96%    BMI 37.21 kg/m2        Physical Exam   Constitutional: He appears well-developed and well-nourished. HENT:   Head: Normocephalic and atraumatic.    Eyes: Pupils are equal, round, and reactive to light. Cardiovascular: Normal rate and regular rhythm. Exam reveals no gallop and no friction rub. No murmur heard. Pulmonary/Chest: He has wheezes. Labored breathing, normal for patient   Psychiatric: He has a normal mood and affect. His behavior is normal.         Assessment and orders:       ICD-10-CM ICD-9-CM    1. Chronic left-sided thoracic back pain M54.6 724.1     G89.29 338.29    2. Type 2 diabetes mellitus without complication, without long-term current use of insulin (HCC) E11.9 250.00 AMB POC GLUCOSE BLOOD, BY GLUCOSE MONITORING DEVICE      metFORMIN ER (GLUCOPHAGE XR) 500 mg tablet      lovastatin (MEVACOR) 20 mg tablet   3. Essential hypertension I10 401.9      Medications refilled at this visit. No back pain currently, no treatment needed at this time. Informed Pt on X-ray results which revealed normal thoracic spinal process. Pt told to return if back pain returns. I have reviewed patient medical and social history and medications. I have reviewed pertinent labs results and other data. I have discussed the diagnosis with the patient and the intended plan as seen in the above orders. The patient has received an after-visit summary and questions were answered concerning future plans. I have discussed medication side effects and warnings with the patient as well.     Cesar Guillen MD  [facility] Resident   08/16/17    Patient discussed and seen with Dr. Nathalie Shaver, Attending Physician

## 2017-08-16 NOTE — PROGRESS NOTES
Results for orders placed or performed in visit on 08/16/17   AMB POC GLUCOSE BLOOD, BY GLUCOSE MONITORING DEVICE   Result Value Ref Range    Glucose  non fasting mg/dL     Coordination of Care  1. Have you been to the ER, urgent care clinic since your last visit? Hospitalized since your last visit? No    2. Have you seen or consulted any other health care providers outside of the [address] [place] [address] since your last visit? Include any pap smears or colon screening. No    Medications  Medication Reconciliation Performed: no  Patient does need refills     Learning Assessment Complete?  yes

## 2017-08-16 NOTE — MR AVS SNAPSHOT
Visit Information Date & Time Provider Department Dept. Phone Encounter #  
 8/16/2017  9:30 AM Nicola Stoll, [address] [place] [address] Avenue [phone] [id number] Follow-up Instructions Return in about 3 months (around 11/16/2017). Upcoming Health Maintenance Date Due COLONOSCOPY 2/19/1969 DTaP/Tdap/Td series (1 - Tdap) 2/19/1972 ZOSTER VACCINE AGE 60> 12/19/2010 EYE EXAM RETINAL OR DILATED Q1 7/1/2014 GLAUCOMA SCREENING Q2Y 2/19/2016 FOOT EXAM Q1 10/13/2016 LIPID PANEL Q1 4/25/2017 INFLUENZA AGE 9 TO ADULT 8/1/2017 MEDICARE YEARLY EXAM 8/11/2017 HEMOGLOBIN A1C Q6M 12/21/2017 MICROALBUMIN Q1 6/21/2018 Allergies as of 8/16/2017  Review Complete On: 8/16/2017 By: Noelle Mcdonald MD  
  
 Severity Noted Reaction Type Reactions Bactrim [Sulfamethoprim Ds]  12/03/2015    Diarrhea, Nausea and Vomiting After on 1st dose. Morphine  08/15/2011    Unknown (comments) Current Immunizations  Reviewed on 12/28/2016 Name Date Influenza Vaccine 10/22/2014, 1/24/2014, 10/1/2012 Influenza Vaccine (Quad) PF 12/28/2016, 10/22/2015 Pneumococcal Polysaccharide (PPSV-23) 12/28/2016 Pneumococcal Vaccine (Unspecified Type) 1/1/2010 Not reviewed this visit You Were Diagnosed With   
  
 Codes Comments Chronic left-sided thoracic back pain    -  Primary ICD-10-CM: M54.6, G89.29 ICD-9-CM: 724.1, 338.29 Type 2 diabetes mellitus without complication, without long-term current use of insulin (HCC)     ICD-10-CM: E11.9 ICD-9-CM: 250.00 Essential hypertension     ICD-10-CM: I10 
ICD-9-CM: 401.9 Vitals BP Pulse Temp Height(growth percentile) Weight(growth percentile) SpO2  
 129/66 (BP 1 Location: Right arm, BP Patient Position: Sitting) 70 98.2 °F (36.8 °C) (Oral) 5' 9\" (1.753 m) 252 lb (114.3 kg) 96% BMI Smoking Status 37.21 kg/m2 Former Smoker BMI and BSA Data Body Mass Index Body Surface Area  
 37.21 kg/m 2 2.36 m 2 Preferred Pharmacy Pharmacy Name Phone [facility] [address] [place] [address], [address] [place] [address] [phone] Your Updated Medication List  
  
   
This list is accurate as of: 8/16/17 10:45 AM.  Always use your most recent med list.  
  
  
  
  
 ADVAIR DISKUS 500-50 mcg/dose diskus inhaler Generic drug:  fluticasone-salmeterol Take 1 Puff by inhalation every twelve (12) hours. albuterol 2.5 mg /3 mL (0.083 %) nebulizer solution Commonly known as:  PROVENTIL VENTOLIN  
3 mL by Nebulization route every four (4) hours as needed for Wheezing. cyclobenzaprine 10 mg tablet Commonly known as:  FLEXERIL  
TAKE ONE TABLET BY MOUTH THREE TIMES A DAY AS NEEDED FOR MUSCLE SPASMS  
  
 finasteride 5 mg tablet Commonly known as:  PROSCAR  
TAKE ONE TABLET BY MOUTH DAILY  
  
 hydroCHLOROthiazide 25 mg tablet Commonly known as:  HYDRODIURIL Take 1 Tab by mouth daily. ipratropium 0.02 % nebulizer solution Commonly known as:  ATROVENT  
2.5 mL by Nebulization route four (4) times daily as needed for Wheezing (shortness of breath). levothyroxine 88 mcg tablet Commonly known as:  SYNTHROID  
TAKE ONE TABLET BY MOUTH EVERY MORNING BEFORE BREAKFAST  
  
 losartan 100 mg tablet Commonly known as:  COZAAR  
TAKE ONE TABLET BY MOUTH DAILY lovastatin 20 mg tablet Commonly known as:  MEVACOR Take 1 Tab by mouth nightly. metFORMIN  mg tablet Commonly known as:  GLUCOPHAGE XR  
TAKE ONE TABLET BY MOUTH DAILY  
  
 metoprolol tartrate 50 mg tablet Commonly known as:  LOPRESSOR  
TAKE ONE TABLET BY MOUTH TWICE A DAY  
  
 sildenafil citrate 100 mg tablet Commonly known as:  VIAGRA Take 1 Tab by mouth as needed. SPIRIVA WITH HANDIHALER 18 mcg inhalation capsule Generic drug:  tiotropium Take 1 Cap by inhalation daily. Prescriptions Sent to Pharmacy Refills  
 metFORMIN ER (GLUCOPHAGE XR) 500 mg tablet 1 Sig: TAKE ONE TABLET BY MOUTH DAILY Class: Normal  
 Pharmacy: [address], [address] [place] [address] Ph #: [phone]  
 lovastatin (MEVACOR) 20 mg tablet 1 Sig: Take 1 Tab by mouth nightly. Class: Normal  
 Pharmacy: [address], [address] [place] [address] Ph #: [phone] Route: Oral  
  
We Performed the Following AMB POC GLUCOSE BLOOD, BY GLUCOSE MONITORING DEVICE [52714 CPT(R)] Follow-up Instructions Return in about 3 months (around 11/16/2017). Introducing [facility]! [address] introduces [employer] patient portal. Now you can access parts of your medical record, email your doctor's office, and request medication refills online. 1. In your internet browser, go to [email or web address] 2. Click on the First Time User? Click Here link in the Sign In box. You will see the New Member Sign Up page. 3. Enter your [employer] Access Code exactly as it appears below. You will not need to use this code after youve completed the sign-up process. If you do not sign up before the expiration date, you must request a new code. · [employer] Access Code: [email or web address] Expires: 9/19/2017  2:03 PM 
 
4. Enter the last four digits of your Social Security Number (xxxx) and Date of Birth (mm/dd/yyyy) as indicated and click Submit. You will be taken to the next sign-up page. 5. Create a [employer] ID. This will be your [employer] login ID and cannot be changed, so think of one that is secure and easy to remember. 6. Create a [employer] password. You can change your password at any time. 7. Enter your Password Reset Question and Answer. This can be used at a later time if you forget your password. 8. Enter your e-mail address. You will receive e-mail notification when new information is available in [address] 9. Click Sign Up. You can now view and download portions of your medical record. 10. Click the Download Summary menu link to download a portable copy of your medical information. If you have questions, please visit the Frequently Asked Questions section of the [employer] website. Remember, [employer] is NOT to be used for urgent needs. For medical emergencies, dial 911. Now available from your iPhone and Android! Please provide this summary of care documentation to your next provider. Your primary care clinician is listed as Ignacio Flowers. If you have any questions after today's visit, please call [phone].

## 2017-09-01 ENCOUNTER — CLINICAL SUPPORT (OUTPATIENT)
Dept: FAMILY MEDICINE CLINIC | Age: 67
End: 2017-09-01

## 2017-09-01 DIAGNOSIS — Z71.9 COUNSELED BY NURSE: Primary | ICD-10-CM

## 2017-10-25 ENCOUNTER — HOSPITAL ENCOUNTER (OUTPATIENT)
Dept: LAB | Age: 67
Discharge: HOME | End: 2017-10-25

## 2017-10-25 ENCOUNTER — OFFICE VISIT (OUTPATIENT)
Dept: FAMILY MEDICINE CLINIC | Age: 67
End: 2017-10-25

## 2017-10-25 VITALS
WEIGHT: 253.2 LBS | TEMPERATURE: 97.6 F | SYSTOLIC BLOOD PRESSURE: 144 MMHG | BODY MASS INDEX: 37.39 KG/M2 | HEART RATE: 68 BPM | DIASTOLIC BLOOD PRESSURE: 74 MMHG

## 2017-10-25 DIAGNOSIS — N40.1 BENIGN PROSTATIC HYPERPLASIA WITH URINARY FREQUENCY: ICD-10-CM

## 2017-10-25 DIAGNOSIS — R35.0 BENIGN PROSTATIC HYPERPLASIA WITH URINARY FREQUENCY: ICD-10-CM

## 2017-10-25 DIAGNOSIS — E03.4 HYPOTHYROIDISM DUE TO ACQUIRED ATROPHY OF THYROID: ICD-10-CM

## 2017-10-25 DIAGNOSIS — Z13.9 ENCOUNTER FOR SCREENING: ICD-10-CM

## 2017-10-25 DIAGNOSIS — I10 ESSENTIAL HYPERTENSION: ICD-10-CM

## 2017-10-25 DIAGNOSIS — J44.9 CHRONIC OBSTRUCTIVE PULMONARY DISEASE, UNSPECIFIED COPD TYPE (HCC): ICD-10-CM

## 2017-10-25 DIAGNOSIS — Z23 ENCOUNTER FOR IMMUNIZATION: ICD-10-CM

## 2017-10-25 DIAGNOSIS — E11.9 TYPE 2 DIABETES MELLITUS WITHOUT COMPLICATION, WITHOUT LONG-TERM CURRENT USE OF INSULIN (HCC): ICD-10-CM

## 2017-10-25 DIAGNOSIS — I10 ESSENTIAL HYPERTENSION: Primary | ICD-10-CM

## 2017-10-25 LAB
ALBUMIN SERPL-MCNC: 3 G/DL (ref 3.5–5)
ALBUMIN/GLOB SERPL: 0.7 {RATIO} (ref 1.1–2.2)
ALP SERPL-CCNC: 167 U/L (ref 45–117)
ALT SERPL-CCNC: 33 U/L (ref 12–78)
ANION GAP SERPL CALC-SCNC: 6 MMOL/L (ref 5–15)
APPEARANCE UR: CLEAR
AST SERPL-CCNC: 38 U/L (ref 15–37)
BASOPHILS # BLD: 0 K/UL (ref 0–0.1)
BASOPHILS NFR BLD: 0 % (ref 0–1)
BILIRUB SERPL-MCNC: 1.2 MG/DL (ref 0.2–1)
BILIRUB UR QL: NEGATIVE
BUN SERPL-MCNC: 17 MG/DL (ref 6–20)
BUN/CREAT SERPL: 13 (ref 12–20)
CALCIUM SERPL-MCNC: 9.3 MG/DL (ref 8.5–10.1)
CHLORIDE SERPL-SCNC: 106 MMOL/L (ref 97–108)
CHOLEST SERPL-MCNC: 111 MG/DL
CO2 SERPL-SCNC: 30 MMOL/L (ref 21–32)
COLOR UR: NORMAL
CREAT SERPL-MCNC: 1.29 MG/DL (ref 0.7–1.3)
EOSINOPHIL # BLD: 0.1 K/UL (ref 0–0.4)
EOSINOPHIL NFR BLD: 2 % (ref 0–7)
ERYTHROCYTE [DISTWIDTH] IN BLOOD BY AUTOMATED COUNT: 14.5 % (ref 11.5–14.5)
GLOBULIN SER CALC-MCNC: 4.2 G/DL (ref 2–4)
GLUCOSE POC: NORMAL MG/DL
GLUCOSE SERPL-MCNC: 100 MG/DL (ref 65–100)
GLUCOSE UR STRIP.AUTO-MCNC: NEGATIVE MG/DL
HCT VFR BLD AUTO: 41.6 % (ref 36.6–50.3)
HDLC SERPL-MCNC: 60 MG/DL
HDLC SERPL: 1.9 {RATIO} (ref 0–5)
HGB BLD-MCNC: 14.2 G/DL (ref 12.1–17)
HGB UR QL STRIP: NEGATIVE
KETONES UR QL STRIP.AUTO: NEGATIVE MG/DL
LDLC SERPL CALC-MCNC: 35.4 MG/DL (ref 0–100)
LEUKOCYTE ESTERASE UR QL STRIP.AUTO: NEGATIVE
LIPID PROFILE,FLP: NORMAL
LYMPHOCYTES # BLD: 1.2 K/UL (ref 0.8–3.5)
LYMPHOCYTES NFR BLD: 21 % (ref 12–49)
MCH RBC QN AUTO: 34.9 PG (ref 26–34)
MCHC RBC AUTO-ENTMCNC: 34.1 G/DL (ref 30–36.5)
MCV RBC AUTO: 102.2 FL (ref 80–99)
MONOCYTES # BLD: 0.6 K/UL (ref 0–1)
MONOCYTES NFR BLD: 11 % (ref 5–13)
NEUTS SEG # BLD: 3.7 K/UL (ref 1.8–8)
NEUTS SEG NFR BLD: 66 % (ref 32–75)
NITRITE UR QL STRIP.AUTO: NEGATIVE
PH UR STRIP: 7 [PH] (ref 5–8)
PLATELET # BLD AUTO: 101 K/UL (ref 150–400)
POTASSIUM SERPL-SCNC: 3.5 MMOL/L (ref 3.5–5.1)
PROT SERPL-MCNC: 7.2 G/DL (ref 6.4–8.2)
PROT UR STRIP-MCNC: NEGATIVE MG/DL
RBC # BLD AUTO: 4.07 M/UL (ref 4.1–5.7)
SODIUM SERPL-SCNC: 142 MMOL/L (ref 136–145)
SP GR UR REFRACTOMETRY: 1.01 (ref 1–1.03)
TRIGL SERPL-MCNC: 78 MG/DL (ref ?–150)
TSH SERPL DL<=0.05 MIU/L-ACNC: 0.57 UIU/ML (ref 0.36–3.74)
UROBILINOGEN UR QL STRIP.AUTO: 1 EU/DL (ref 0.2–1)
VLDLC SERPL CALC-MCNC: 15.6 MG/DL
WBC # BLD AUTO: 5.6 K/UL (ref 4.1–11.1)

## 2017-10-25 PROCEDURE — 81003 URINALYSIS AUTO W/O SCOPE: CPT | Performed by: NURSE PRACTITIONER

## 2017-10-25 PROCEDURE — 80053 COMPREHEN METABOLIC PANEL: CPT | Performed by: NURSE PRACTITIONER

## 2017-10-25 PROCEDURE — 85025 COMPLETE CBC W/AUTO DIFF WBC: CPT | Performed by: NURSE PRACTITIONER

## 2017-10-25 PROCEDURE — 80061 LIPID PANEL: CPT | Performed by: NURSE PRACTITIONER

## 2017-10-25 PROCEDURE — 84153 ASSAY OF PSA TOTAL: CPT | Performed by: NURSE PRACTITIONER

## 2017-10-25 PROCEDURE — 84443 ASSAY THYROID STIM HORMONE: CPT | Performed by: NURSE PRACTITIONER

## 2017-10-25 RX ORDER — LOSARTAN POTASSIUM AND HYDROCHLOROTHIAZIDE 25; 100 MG/1; MG/1
1 TABLET ORAL DAILY
Qty: 90 TAB | Refills: 3 | Status: SHIPPED | OUTPATIENT
Start: 2017-10-25 | End: 2018-04-02 | Stop reason: SINTOL

## 2017-10-25 RX ORDER — TERAZOSIN 1 MG/1
1 CAPSULE ORAL
Qty: 30 CAP | Refills: 3 | Status: SHIPPED | OUTPATIENT
Start: 2017-10-25 | End: 2018-03-02 | Stop reason: SDUPTHER

## 2017-10-25 RX ORDER — METFORMIN HYDROCHLORIDE 500 MG/1
TABLET, EXTENDED RELEASE ORAL
Qty: 90 TAB | Refills: 1 | Status: SHIPPED | OUTPATIENT
Start: 2017-10-25 | End: 2018-05-08 | Stop reason: ALTCHOICE

## 2017-10-25 RX ORDER — LEVOTHYROXINE SODIUM 88 UG/1
TABLET ORAL
Qty: 90 TAB | Refills: 3 | Status: SHIPPED | OUTPATIENT
Start: 2017-10-25 | End: 2018-09-10 | Stop reason: SDUPTHER

## 2017-10-25 RX ORDER — LOVASTATIN 20 MG/1
20 TABLET ORAL
Qty: 90 TAB | Refills: 1 | Status: SHIPPED | OUTPATIENT
Start: 2017-10-25 | End: 2018-09-10 | Stop reason: SDUPTHER

## 2017-10-25 NOTE — PATIENT INSTRUCTIONS
Terazosin - (By mouth)   Why this medicine is used:   Treats high blood pressure and enlarged prostate. Contact a nurse or doctor right away if you have:  · Fast or pounding heartbeat  · Swelling in the hands, ankles, or feet; rapid weight gain  · Prolonged or painful erection  · Lightheadedness, fainting     Common side effects:  · Tiredness  · Nausea  · Runny or stuffy nose  © 2017 [address] Information is for End User's use only and may not be sold, redistributed or otherwise used for commercial purposes.

## 2017-10-25 NOTE — MR AVS SNAPSHOT
Visit Information Date & Time Provider Department Dept. Phone Encounter #  
 10/25/2017  9:30 AM Bryson SanchezTolu [facility] [phone] [id number] Follow-up Instructions Return in about 6 weeks (around 12/6/2017). Upcoming Health Maintenance Date Due COLONOSCOPY 2/19/1969 DTaP/Tdap/Td series (1 - Tdap) 2/19/1972 ZOSTER VACCINE AGE 60> 12/19/2010 EYE EXAM RETINAL OR DILATED Q1 7/1/2014 GLAUCOMA SCREENING Q2Y 2/19/2016 FOOT EXAM Q1 10/13/2016 LIPID PANEL Q1 4/25/2017 INFLUENZA AGE 9 TO ADULT 8/1/2017 MEDICARE YEARLY EXAM 8/11/2017 HEMOGLOBIN A1C Q6M 12/21/2017 MICROALBUMIN Q1 6/21/2018 Allergies as of 10/25/2017  Review Complete On: 10/25/2017 By: Bryson Sanchez NP Severity Noted Reaction Type Reactions Bactrim [Sulfamethoprim Ds]  12/03/2015    Diarrhea, Nausea and Vomiting After on 1st dose. Morphine  08/15/2011    Unknown (comments) Current Immunizations  Reviewed on 12/28/2016 Name Date Influenza Vaccine 10/22/2014, 1/24/2014, 10/1/2012 Influenza Vaccine (Quad) PF 12/28/2016, 10/22/2015 Pneumococcal Polysaccharide (PPSV-23) 12/28/2016 Pneumococcal Vaccine (Unspecified Type) 1/1/2010 Not reviewed this visit You Were Diagnosed With   
  
 Codes Comments Essential hypertension    -  Primary ICD-10-CM: I10 
ICD-9-CM: 401.9 Encounter for screening     ICD-10-CM: Z13.9 ICD-9-CM: V82.9 Hypothyroidism due to acquired atrophy of thyroid     ICD-10-CM: E03.4 ICD-9-CM: 244.8, 246.8 Type 2 diabetes mellitus without complication, without long-term current use of insulin (HCC)     ICD-10-CM: E11.9 ICD-9-CM: 250.00 Chronic obstructive pulmonary disease, unspecified COPD type ([facility]     ICD-10-CM: J44.9 ICD-9-CM: 668 Benign prostatic hyperplasia with urinary frequency     ICD-10-CM: N40.1, R35.0 ICD-9-CM: 600.01, 788.41 Vitals BP Pulse Temp Weight(growth percentile) BMI Smoking Status 144/74 (BP 1 Location: Left arm, BP Patient Position: Sitting) 68 97.6 °F (36.4 °C) (Oral) 253 lb 3.2 oz (114.9 kg) 37.39 kg/m2 Former Smoker Vitals History BMI and BSA Data Body Mass Index Body Surface Area  
 37.39 kg/m 2 2.37 m 2 Preferred Pharmacy Pharmacy Name Phone [facility] [address] [place] [address] [phone] Your Updated Medication List  
  
   
This list is accurate as of: 10/25/17 10:28 AM.  Always use your most recent med list.  
  
  
  
  
 ADVAIR DISKUS 500-50 mcg/dose diskus inhaler Generic drug:  fluticasone-salmeterol Take 1 Puff by inhalation every twelve (12) hours. albuterol 2.5 mg /3 mL (0.083 %) nebulizer solution Commonly known as:  PROVENTIL VENTOLIN  
3 mL by Nebulization route every four (4) hours as needed for Wheezing. ipratropium 0.02 % Soln Commonly known as:  ATROVENT  
2.5 mL by Nebulization route four (4) times daily as needed for Wheezing (shortness of breath). levothyroxine 88 mcg tablet Commonly known as:  SYNTHROID  
TAKE ONE TABLET BY MOUTH EVERY MORNING BEFORE BREAKFAST  
  
 losartan-hydroCHLOROthiazide 100-25 mg per tablet Commonly known as:  HYZAAR Take 1 Tab by mouth daily. lovastatin 20 mg tablet Commonly known as:  MEVACOR Take 1 Tab by mouth nightly. metFORMIN  mg tablet Commonly known as:  GLUCOPHAGE XR  
TAKE ONE TABLET BY MOUTH DAILY  
  
 metoprolol tartrate 50 mg tablet Commonly known as:  LOPRESSOR  
TAKE ONE TABLET BY MOUTH TWICE A DAY SPIRIVA WITH HANDIHALER 18 mcg inhalation capsule Generic drug:  tiotropium Take 1 Cap by inhalation daily. terazosin 1 mg capsule Commonly known as:  HYTRIN Take 1 Cap by mouth nightly. Prescriptions Sent to Pharmacy  Refills  
 metFORMIN ER (GLUCOPHAGE XR) 500 mg tablet 1  
 Sig: TAKE ONE TABLET BY MOUTH DAILY Class: Normal  
 Pharmacy: [address] Ph #: [phone]  
 lovastatin (MEVACOR) 20 mg tablet 1 Sig: Take 1 Tab by mouth nightly. Class: Normal  
 Pharmacy: [address] Ph #: [phone] Route: Oral  
 levothyroxine (SYNTHROID) 88 mcg tablet 3 Sig: TAKE ONE TABLET BY MOUTH EVERY MORNING BEFORE BREAKFAST Class: Normal  
 Pharmacy: [address] Ph #: [phone]  
 terazosin (HYTRIN) 1 mg capsule 3 Sig: Take 1 Cap by mouth nightly. Class: Normal  
 Pharmacy: [address] Ph #: [phone] Route: Oral  
 losartan-hydroCHLOROthiazide (HYZAAR) 100-25 mg per tablet 3 Sig: Take 1 Tab by mouth daily. Class: Normal  
 Pharmacy: [address] Ph #: [phone] Route: Oral  
  
We Performed the Following AMB POC GLUCOSE BLOOD, BY GLUCOSE MONITORING DEVICE [43024 CPT(R)] Follow-up Instructions Return in about 6 weeks (around 12/6/2017). Patient Instructions Terazosin - (By mouth) Why this medicine is used:  
Treats high blood pressure and enlarged prostate. Contact a nurse or doctor right away if you have: 
· Fast or pounding heartbeat · Swelling in the hands, ankles, or feet; rapid weight gain · Prolonged or painful erection · Lightheadedness, fainting Common side effects: · Tiredness · Nausea · Runny or stuffy nose © 2017 [facility] Information is for End User's use only and may not be sold, redistributed or otherwise used for commercial purposes. Introducing [facility] SERVICES! [employer] introduces [employer] patient portal. Now you can access parts of your medical record, email your doctor's office, and request medication refills online. 1. In your internet browser, go to [email or web address] 2. Click on the First Time User? Click Here link in the Sign In box. You will see the New Member Sign Up page. 3. Enter your [employer] Access Code exactly as it appears below. You will not need to use this code after youve completed the sign-up process. If you do not sign up before the expiration date, you must request a new code. · [employer] Access Code: [email or web address] Expires: 1/23/2018 10:28 AM 
 
4. Enter the last four digits of your Social Security Number (xxxx) and Date of Birth (mm/dd/yyyy) as indicated and click Submit. You will be taken to the next sign-up page. 5. Create a Te[employer] ID. This will be your [employer] login ID and cannot be changed, so think of one that is secure and easy to remember. 6. Create a [employer] password. You can change your password at any time. 7. Enter your Password Reset Question and Answer. This can be used at a later time if you forget your password. 8. Enter your e-mail address. You will receive e-mail notification when new information is available in [address] 9. Click Sign Up. You can now view and download portions of your medical record. 10. Click the Download Summary menu link to download a portable copy of your medical information. If you have questions, please visit the Frequently Asked Questions section of the [employer] website. Remember, [employer] is NOT to be used for urgent needs. For medical emergencies, dial 911. Now available from your iPhone and Android! Please provide this summary of care documentation to your next provider. Your primary care clinician is listed as Vel Gaxiola. If you have any questions after today's visit, please call [phone].

## 2017-10-25 NOTE — PROGRESS NOTES
Christen Taylor completed screening documentation. No contraindications for administering today's ordered vaccines. Vaccine Immunization Statement(s) given and instructions for adverse reaction. No adverse reaction noted at time of discharge, explained possible s/e. Reviewed symptoms indicating need to be seen in ER. Patient given flu vaccine; denies fever. Pt had no adverse reaction at time of d/c. Vaccine administration documented into [place] Immunization Information System.

## 2017-10-25 NOTE — PROGRESS NOTES
Subjective:     Chief Complaint   Patient presents with    Follow-up    Skin Exam        He  is a 78 y.o. male who presents for routine follow-up. Since, Pt reports overall good and no new concerning S&S. Has noted some seasonal congestion/URI. No associated SOB, cough, fevers/chills, throat pain nor increased PERRY. Notes poor response to Proscar, wakes up 3-4x night to urinate. No other attempted agents/Rx nor OTC meds. Does not have Rx coverage through Medicare. Is requesting flu vaccine today. Is not sure what Rx he needs refills on at this time. ROS  Gen - no fever/chills  Resp - no dyspnea or cough  CV - no chest pain or PERRY  Rest per HPI    Past Medical History:   Diagnosis Date    Blurred vision     Cirrhosis, alcoholic (HCC)     COPD bronchitis     Diabetes mellitus ([address]     Ear discomfort     History of ETOH abuse     HTN (hypertension)     SOB (shortness of breath)     Thrombocytopenia ([address]     Trouble in sleeping      Past Surgical History:   Procedure Laterality Date    HX CATARACT REMOVAL Right 2007    HX CATARACT REMOVAL Left 2012    HX CIRCUMCISION      at 26 y/o    HX HERNIA REPAIR  as a youth    right side    HX SHOULDER ARTHROSCOPY      left x 3 for torn rotator cuff muscle     Current Outpatient Prescriptions on File Prior to Visit   Medication Sig Dispense Refill    metFORMIN ER (GLUCOPHAGE XR) 500 mg tablet TAKE ONE TABLET BY MOUTH DAILY 90 Tab 1    lovastatin (MEVACOR) 20 mg tablet Take 1 Tab by mouth nightly. 90 Tab 1    levothyroxine (SYNTHROID) 88 mcg tablet TAKE ONE TABLET BY MOUTH EVERY MORNING BEFORE BREAKFAST 30 Tab 3    finasteride (PROSCAR) 5 mg tablet TAKE ONE TABLET BY MOUTH DAILY 90 Tab 3    hydroCHLOROthiazide (HYDRODIURIL) 25 mg tablet Take 1 Tab by mouth daily.  180 Tab 1    cyclobenzaprine (FLEXERIL) 10 mg tablet TAKE ONE TABLET BY MOUTH THREE TIMES A DAY AS NEEDED FOR MUSCLE SPASMS 30 Tab 1    losartan (COZAAR) 100 mg tablet TAKE ONE TABLET BY MOUTH DAILY 90 Tab 1    metoprolol tartrate (LOPRESSOR) 50 mg tablet TAKE ONE TABLET BY MOUTH TWICE A  Tab 1    albuterol (PROVENTIL VENTOLIN) 2.5 mg /3 mL (0.083 %) nebulizer solution 3 mL by Nebulization route every four (4) hours as needed for Wheezing. 180 Each 5    ipratropium (ATROVENT) 0.02 % nebulizer solution 2.5 mL by Nebulization route four (4) times daily as needed for Wheezing (shortness of breath). 180 mL 5    sildenafil citrate (VIAGRA) 100 mg tablet Take 1 Tab by mouth as needed. 30 Tab 0    fluticasone-salmeterol (ADVAIR DISKUS) 500-50 mcg/dose diskus inhaler Take 1 Puff by inhalation every twelve (12) hours.  tiotropium (SPIRIVA WITH HANDIHALER) 18 mcg inhalation capsule Take 1 Cap by inhalation daily. No current facility-administered medications on file prior to visit. Objective:     Vitals:    10/25/17 0934   BP: 144/74   Pulse: 68   Temp: 97.6 °F (36.4 °C)   TempSrc: Oral   Weight: 253 lb 3.2 oz (114.9 kg)       Physical Examination:  General appearance - alert, well appearing, and in no distress  Eyes -sclera anicteric  Neck - supple, no significant adenopathy, no thyromegaly  Chest - clear to auscultation, no wheezes, rales or rhonchi, symmetric air entry  Heart - normal rate, regular rhythm, normal S1, S2, no murmurs, rubs, clicks or gallops  Neurological - alert, oriented, no focal findings or movement disorder noted  Abdomen-BS present/WNL x 4 quads, non-tender/distended, soft,no organomegaly    HENT unremarkable. Recent Results (from the past 12 hour(s))   AMB POC GLUCOSE BLOOD, BY GLUCOSE MONITORING DEVICE    Collection Time: 10/25/17  9:38 AM   Result Value Ref Range    Glucose  NF mg/dL       Assessment/ Plan:   Diagnoses and all orders for this visit:    1. Essential hypertension  -     METABOLIC PANEL, COMPREHENSIVE; Future  -     URINALYSIS W/ RFLX MICROSCOPIC;  Future  -     losartan-hydroCHLOROthiazide (HYZAAR) 100-25 mg per tablet; Take 1 Tab by mouth daily.  -     LIPID PANEL; Future    2. Encounter for screening  -     AMB POC GLUCOSE BLOOD, BY GLUCOSE MONITORING DEVICE    3. Hypothyroidism due to acquired atrophy of thyroid  -     levothyroxine (SYNTHROID) 88 mcg tablet; TAKE ONE TABLET BY MOUTH EVERY MORNING BEFORE BREAKFAST  -     TSH 3RD GENERATION; Future  -     LIPID PANEL; Future    4. Type 2 diabetes mellitus without complication, without long-term current use of insulin (HCC)  -     metFORMIN ER (GLUCOPHAGE XR) 500 mg tablet; TAKE ONE TABLET BY MOUTH DAILY  -     lovastatin (MEVACOR) 20 mg tablet; Take 1 Tab by mouth nightly. 5. Chronic obstructive pulmonary disease, unspecified COPD type ([facility]    6. Benign prostatic hyperplasia with urinary frequency  -     terazosin (HYTRIN) 1 mg capsule; Take 1 Cap by mouth nightly. -     CBC WITH AUTOMATED DIFF; Future  -     METABOLIC PANEL, COMPREHENSIVE; Future  -     URINALYSIS W/ RFLX MICROSCOPIC; Future  -     PSA W/ REFLX FREE PSA; Future    7. Encounter for immunization  -     Influenza virus vaccine (QUADRIVALENT PRES FREE SYRINGE) IM (40890)         Review med rec with Pt. Check labs. Start Terazosin 1mg QHS for BPH S&S. Advised to taper Q2-3 nights as tolerated for effect until reaching 5mg. Combine Losartan/HCTZ in combo pill at same dose. Refill thyroid Rx while labs pending. RTC in 4-6 weeks to discuss response to BPH Rx change. May explore Cialis via PAP or urology referral if not improved. Flu vaccine well tolerated. Spent > 15 minutes in direct patient counseling and education. I have discussed the diagnosis with the patient and the intended plan as seen in the above orders. The patient has received an after-visit summary and questions were answered concerning future plans. I have discussed medication side effects and warnings with the patient as well.   The patient verbalizes understanding and agreement with the plan.    Follow-up Disposition: Not on File

## 2017-10-25 NOTE — PROGRESS NOTES
Coordination of Care  1. Have you been to the ER, urgent care clinic since your last visit? Hospitalized since your last visit? No    2. Have you seen or consulted any other health care providers outside of the [address] [place] [address] since your last visit? Include any pap smears or colon screening. No    Medications  Does the patient need refills? YES    Learning Assessment Complete?  Yes    Results for orders placed or performed in visit on 10/25/17   AMB POC GLUCOSE BLOOD, BY GLUCOSE MONITORING DEVICE   Result Value Ref Range    Glucose  NF mg/dL

## 2017-10-27 LAB
PSA SERPL-MCNC: 0.6 NG/ML (ref 0–4)
REFLEX CRITERIA: NORMAL

## 2017-10-27 NOTE — PROGRESS NOTES
GFR slightly decreased, cont to monitor. Labs otherwise WNL/expected for Pt's baseline/Hx of thrombocytopenia.

## 2017-11-17 ENCOUNTER — TELEPHONE (OUTPATIENT)
Dept: FAMILY MEDICINE CLINIC | Age: 67
End: 2017-11-17

## 2017-11-17 DIAGNOSIS — N52.9 ERECTILE DYSFUNCTION, UNSPECIFIED ERECTILE DYSFUNCTION TYPE: ICD-10-CM

## 2017-11-17 RX ORDER — SILDENAFIL CITRATE 100 MG/1
100 TABLET, FILM COATED ORAL AS NEEDED
Qty: 30 TAB | Refills: 3 | Status: SHIPPED | COMMUNITY
Start: 2017-11-17 | End: 2019-04-11 | Stop reason: SDUPTHER

## 2017-11-17 NOTE — TELEPHONE ENCOUNTER
----- Message from Krystyna Loera RN sent at 11/17/2017  4:20 PM EST -----  Is this patient still taking viagra? He picked up in September and a shipment came in today but I do not see it listed under current meds, appears to have been dc'd on 10/25/17. Want to confirm if taking or not please. Thanks    Gibson Sousa, pt med is in drawer not signed off on yet.     Eduarda Santos

## 2017-11-27 DIAGNOSIS — M62.830 BACK SPASM: ICD-10-CM

## 2017-12-05 ENCOUNTER — OFFICE VISIT (OUTPATIENT)
Dept: FAMILY MEDICINE CLINIC | Age: 67
End: 2017-12-05

## 2017-12-05 VITALS
OXYGEN SATURATION: 96 % | HEART RATE: 72 BPM | SYSTOLIC BLOOD PRESSURE: 126 MMHG | BODY MASS INDEX: 36.77 KG/M2 | TEMPERATURE: 97.9 F | WEIGHT: 249 LBS | DIASTOLIC BLOOD PRESSURE: 64 MMHG

## 2017-12-05 DIAGNOSIS — J44.9 CHRONIC OBSTRUCTIVE PULMONARY DISEASE, UNSPECIFIED COPD TYPE (HCC): ICD-10-CM

## 2017-12-05 DIAGNOSIS — E11.9 TYPE 2 DIABETES MELLITUS WITHOUT COMPLICATION, WITHOUT LONG-TERM CURRENT USE OF INSULIN (HCC): Primary | ICD-10-CM

## 2017-12-05 LAB — GLUCOSE POC: NORMAL MG/DL

## 2017-12-05 RX ORDER — CYCLOBENZAPRINE HCL 10 MG
TABLET ORAL
Qty: 30 TAB | Refills: 0 | OUTPATIENT
Start: 2017-12-05

## 2017-12-05 NOTE — PATIENT INSTRUCTIONS
Shortness of Breath: Care Instructions  Your Care Instructions  Shortness of breath has many causes. Sometimes conditions such as anxiety can lead to shortness of breath. Some people get mild shortness of breath when they exercise. Trouble breathing also can be a symptom of a serious problem, such as asthma, lung disease, emphysema, heart problems, and pneumonia. If your shortness of breath continues, you may need tests and treatment. Watch for any changes in your breathing and other symptoms. Follow-up care is a key part of your treatment and safety. Be sure to make and go to all appointments, and call your doctor if you are having problems. It's also a good idea to know your test results and keep a list of the medicines you take. How can you care for yourself at home? · Do not smoke or allow others to smoke around you. If you need help quitting, talk to your doctor about stop-smoking programs and medicines. These can increase your chances of quitting for good. · Get plenty of rest and sleep. · Take your medicines exactly as prescribed. Call your doctor if you think you are having a problem with your medicine. · Find healthy ways to deal with stress. ¨ Exercise daily. ¨ Get plenty of sleep. ¨ Eat regularly and well. When should you call for help? Call 911 anytime you think you may need emergency care. For example, call if:  ? · You have severe shortness of breath. ? · You have symptoms of a heart attack. These may include:  ¨ Chest pain or pressure, or a strange feeling in the chest.  ¨ Sweating. ¨ Shortness of breath. ¨ Nausea or vomiting. ¨ Pain, pressure, or a strange feeling in the back, neck, jaw, or upper belly or in one or both shoulders or arms. ¨ Lightheadedness or sudden weakness. ¨ A fast or irregular heartbeat. After you call 911, the  may tell you to chew 1 adult-strength or 2 to 4 low-dose aspirin. Wait for an ambulance. Do not try to drive yourself.    ?Call your doctor now or seek immediate medical care if:  ? · Your shortness of breath gets worse or you start to wheeze. Wheezing is a high-pitched sound when you breathe. ? · You wake up at night out of breath or have to prop your head up on several pillows to breathe. ? · You are short of breath after only light activity or while at rest.   ? Watch closely for changes in your health, and be sure to contact your doctor if:  ? · You do not get better over the next 1 to 2 days. Where can you learn more? Go to http://buffy-christiano.info/. Enter S780 in the search box to learn more about \"Shortness of Breath: Care Instructions. \"  Current as of: May 12, 2017  Content Version: 11.4  © 7906-1710 [employer]. Care instructions adapted under license by [employer] (which disclaims liability or warranty for this information). If you have questions about a medical condition or this instruction, always ask your healthcare professional. [address] any warranty or liability for your use of this information.

## 2017-12-05 NOTE — PROGRESS NOTES
Coordination of Care  1. Have you been to the ER, urgent care clinic since your last visit? Hospitalized since your last visit? No    2. Have you seen or consulted any other health care providers outside of the [address] [place] [address] since your last visit? Include any pap smears or colon screening. No    Medications  Does the patient need refills?  YES    Learning Assessment Complete? yes  Results for orders placed or performed in visit on 12/05/17   AMB POC GLUCOSE BLOOD, BY GLUCOSE MONITORING DEVICE   Result Value Ref Range    Glucose  Non fasting mg/dL

## 2017-12-05 NOTE — MR AVS SNAPSHOT
Visit Information Date & Time Provider Department Dept. Phone Encounter #  
 12/5/2017  9:30 AM [phone] [id number] Follow-up Instructions Return in about 6 weeks (around 1/16/2018). Upcoming Health Maintenance Date Due COLONOSCOPY 2/19/1969 DTaP/Tdap/Td series (1 - Tdap) 2/19/1972 ZOSTER VACCINE AGE 60> 12/19/2010 EYE EXAM RETINAL OR DILATED Q1 7/1/2014 GLAUCOMA SCREENING Q2Y 2/19/2016 FOOT EXAM Q1 10/13/2016 MEDICARE YEARLY EXAM 8/11/2017 HEMOGLOBIN A1C Q6M 12/21/2017 MICROALBUMIN Q1 6/21/2018 LIPID PANEL Q1 10/25/2018 Allergies as of 12/5/2017  Review Complete On: 12/5/2017 By: Meg Matthews NP Severity Noted Reaction Type Reactions Bactrim [Sulfamethoprim Ds]  12/03/2015    Diarrhea, Nausea and Vomiting After on 1st dose. Morphine  08/15/2011    Unknown (comments) Current Immunizations  Reviewed on 10/25/2017 Name Date Influenza Vaccine 10/22/2014, 1/24/2014, 10/1/2012 Influenza Vaccine (Quad) PF 10/25/2017, 12/28/2016, 10/22/2015 Pneumococcal Polysaccharide (PPSV-23) 12/28/2016 Pneumococcal Vaccine (Unspecified Type) 1/1/2010 Not reviewed this visit You Were Diagnosed With   
  
 Codes Comments Type 2 diabetes mellitus without complication, without long-term current use of insulin (HCC)    -  Primary ICD-10-CM: E11.9 ICD-9-CM: 250.00 Chronic obstructive pulmonary disease, unspecified COPD type ([facility]     ICD-10-CM: J44.9 ICD-9-CM: 750 Vitals BP Pulse Temp Weight(growth percentile) SpO2 BMI  
 126/64 (BP 1 Location: Left arm, BP Patient Position: Sitting) 72 97.9 °F (36.6 °C) (Oral) 249 lb (112.9 kg) 96% 36.77 kg/m2 Smoking Status Former Smoker Vitals History BMI and BSA Data Body Mass Index Body Surface Area  
 36.77 kg/m 2 2.34 m 2 Preferred Pharmacy Pharmacy Name Phone [address] [phone] [facility] [phone] Your Updated Medication List  
  
   
This list is accurate as of: 12/5/17 10:22 AM.  Always use your most recent med list.  
  
  
  
  
 ADVAIR DISKUS 500-50 mcg/dose diskus inhaler Generic drug:  fluticasone-salmeterol Take 1 Puff by inhalation every twelve (12) hours. albuterol 2.5 mg /3 mL (0.083 %) nebulizer solution Commonly known as:  PROVENTIL VENTOLIN  
3 mL by Nebulization route every four (4) hours as needed for Wheezing. ipratropium 0.02 % Soln Commonly known as:  ATROVENT  
2.5 mL by Nebulization route four (4) times daily as needed for Wheezing (shortness of breath). levothyroxine 88 mcg tablet Commonly known as:  SYNTHROID  
TAKE ONE TABLET BY MOUTH EVERY MORNING BEFORE BREAKFAST  
  
 losartan-hydroCHLOROthiazide 100-25 mg per tablet Commonly known as:  HYZAAR Take 1 Tab by mouth daily. lovastatin 20 mg tablet Commonly known as:  MEVACOR Take 1 Tab by mouth nightly. metFORMIN  mg tablet Commonly known as:  GLUCOPHAGE XR  
TAKE ONE TABLET BY MOUTH DAILY  
  
 metoprolol tartrate 50 mg tablet Commonly known as:  LOPRESSOR  
TAKE ONE TABLET BY MOUTH TWICE A DAY SPIRIVA WITH HANDIHALER 18 mcg inhalation capsule Generic drug:  tiotropium Take 1 Cap by inhalation daily. terazosin 1 mg capsule Commonly known as:  HYTRIN Take 1 Cap by mouth nightly. VIAGRA 100 mg tablet Generic drug:  sildenafil citrate Take 1 Tab by mouth as needed. We Performed the Following AMB POC GLUCOSE BLOOD, BY GLUCOSE MONITORING DEVICE [55142 CPT(R)] Follow-up Instructions Return in about 6 weeks (around 1/16/2018). To-Do List   
 12/08/2017 Imaging:  XR CHEST PA LAT Patient Instructions Shortness of Breath: Care Instructions Your Care Instructions Shortness of breath has many causes. Sometimes conditions such as anxiety can lead to shortness of breath. Some people get mild shortness of breath when they exercise. Trouble breathing also can be a symptom of a serious problem, such as asthma, lung disease, emphysema, heart problems, and pneumonia. If your shortness of breath continues, you may need tests and treatment. Watch for any changes in your breathing and other symptoms. Follow-up care is a key part of your treatment and safety. Be sure to make and go to all appointments, and call your doctor if you are having problems. It's also a good idea to know your test results and keep a list of the medicines you take. How can you care for yourself at home? · Do not smoke or allow others to smoke around you. If you need help quitting, talk to your doctor about stop-smoking programs and medicines. These can increase your chances of quitting for good. · Get plenty of rest and sleep. · Take your medicines exactly as prescribed. Call your doctor if you think you are having a problem with your medicine. · Find healthy ways to deal with stress. ¨ Exercise daily. ¨ Get plenty of sleep. ¨ Eat regularly and well. When should you call for help? Call 911 anytime you think you may need emergency care. For example, call if: 
? · You have severe shortness of breath. ? · You have symptoms of a heart attack. These may include: ¨ Chest pain or pressure, or a strange feeling in the chest. 
¨ Sweating. ¨ Shortness of breath. ¨ Nausea or vomiting. ¨ Pain, pressure, or a strange feeling in the back, neck, jaw, or upper belly or in one or both shoulders or arms. ¨ Lightheadedness or sudden weakness. ¨ A fast or irregular heartbeat. After you call 911, the  may tell you to chew 1 adult-strength or 2 to 4 low-dose aspirin. Wait for an ambulance. Do not try to drive yourself. ?Call your doctor now or seek immediate medical care if: ? · Your shortness of breath gets worse or you start to wheeze. Wheezing is a high-pitched sound when you breathe. ? · You wake up at night out of breath or have to prop your head up on several pillows to breathe. ? · You are short of breath after only light activity or while at rest. ? Watch closely for changes in your health, and be sure to contact your doctor if: 
? · You do not get better over the next 1 to 2 days. Where can you learn more? Go to http://buffy-christiano.info/. Enter S780 in the search box to learn more about \"Shortness of Breath: Care Instructions. \" Current as of: May 12, 2017 Content Version: 11.4 © 7193-9098 [employer]. Care instructions adapted under license by [employer] (which disclaims liability or warranty for this information). If you have questions about a medical condition or this instruction, always ask your healthcare professional. [address] any warranty or liability for your use of this information. Introducing [facility]! Randy Hagan introduces [employer] patient portal. Now you can access parts of your medical record, email your doctor's office, and request medication refills online. 1. In your internet browser, go to [email or web address] 2. Click on the First Time User? Click Here link in the Sign In box. You will see the New Member Sign Up page. 3. Enter your [employer] Access Code exactly as it appears below. You will not need to use this code after youve completed the sign-up process. If you do not sign up before the expiration date, you must request a new code. · [employer] Access Code: [email or web address] Expires: 1/23/2018  9:28 AM 
 
4. Enter the last four digits of your Social Security Number (xxxx) and Date of Birth (mm/dd/yyyy) as indicated and click Submit. You will be taken to the next sign-up page. 5. Create a [employer] ID. This will be your [employer] login ID and cannot be changed, so think of one that is secure and easy to remember. 6. Create a [employer] password. You can change your password at any time. 7. Enter your Password Reset Question and Answer. This can be used at a later time if you forget your password. 8. Enter your e-mail address. You will receive e-mail notification when new information is available in [address] 9. Click Sign Up. You can now view and download portions of your medical record. 10. Click the Download Summary menu link to download a portable copy of your medical information. If you have questions, please visit the Frequently Asked Questions section of the [employer] website. Remember, [employer] is NOT to be used for urgent needs. For medical emergencies, dial 911. Now available from your iPhone and Android! Please provide this summary of care documentation to your next provider. Your primary care clinician is listed as Juan F Chavis. If you have any questions after today's visit, please call [phone].

## 2017-12-05 NOTE — PROGRESS NOTES
Subjective:     Chief Complaint   Patient presents with    Follow-up     Follow up visit        He  is a 78 y.o. male who presents for evaluation of BPH,COPD and chronic conditions. Since LOV, Pt notes improved nocturnal awakenings prior to starting Terazosin. Usually only now wakes up once to urinate. Has noted some increasing SOB x 6-9 months, where he has to stop and catch his breath after walking to his mailbox. Is short lived and resolves with rest.     Has not used albuterol PRN for these S&S. No associated CP, dyspnea, palpitations, dizziness nor any atypical low ext edema/swelling. No cough nor sputum production. Is not currently being followed by pulmonary. Confirms he takes his scheduled inhalers as ordered. ROS  Gen - no fever/chills  Resp - no dyspnea or cough  CV - no chest pain or PERRY  Rest per HPI    Past Medical History:   Diagnosis Date    Blurred vision     Cirrhosis, alcoholic (HCC)     COPD bronchitis     Diabetes mellitus ([address]     Ear discomfort     History of ETOH abuse     HTN (hypertension)     SOB (shortness of breath)     Thrombocytopenia ([address]     Trouble in sleeping      Past Surgical History:   Procedure Laterality Date    HX CATARACT REMOVAL Right 2007    HX CATARACT REMOVAL Left 2012    HX CIRCUMCISION      at 24 y/o    HX HERNIA REPAIR  as a youth    right side    HX SHOULDER ARTHROSCOPY      left x 3 for torn rotator cuff muscle     Current Outpatient Prescriptions on File Prior to Visit   Medication Sig Dispense Refill    VIAGRA 100 mg tablet Take 1 Tab by mouth as needed. 30 Tab 3    metFORMIN ER (GLUCOPHAGE XR) 500 mg tablet TAKE ONE TABLET BY MOUTH DAILY 90 Tab 1    lovastatin (MEVACOR) 20 mg tablet Take 1 Tab by mouth nightly. 90 Tab 1    levothyroxine (SYNTHROID) 88 mcg tablet TAKE ONE TABLET BY MOUTH EVERY MORNING BEFORE BREAKFAST 90 Tab 3    terazosin (HYTRIN) 1 mg capsule Take 1 Cap by mouth nightly.  30 Cap 3    losartan-hydroCHLOROthiazide (HYZAAR) 100-25 mg per tablet Take 1 Tab by mouth daily. 90 Tab 3    metoprolol tartrate (LOPRESSOR) 50 mg tablet TAKE ONE TABLET BY MOUTH TWICE A  Tab 1    albuterol (PROVENTIL VENTOLIN) 2.5 mg /3 mL (0.083 %) nebulizer solution 3 mL by Nebulization route every four (4) hours as needed for Wheezing. 180 Each 5    ipratropium (ATROVENT) 0.02 % nebulizer solution 2.5 mL by Nebulization route four (4) times daily as needed for Wheezing (shortness of breath). 180 mL 5    fluticasone-salmeterol (ADVAIR DISKUS) 500-50 mcg/dose diskus inhaler Take 1 Puff by inhalation every twelve (12) hours.  tiotropium (SPIRIVA WITH HANDIHALER) 18 mcg inhalation capsule Take 1 Cap by inhalation daily. No current facility-administered medications on file prior to visit. Objective:     Vitals:    12/05/17 0926   BP: 126/64   Pulse: 72   Temp: 97.9 °F (36.6 °C)   TempSrc: Oral   SpO2: 96%   Weight: 249 lb (112.9 kg)       Physical Examination:  General appearance - alert, well appearing, and in no distress  Eyes -sclera anicteric  Neck - supple, no significant adenopathy, no thyromegaly  Chest - clear to auscultation, trace wheezes in ant lobes, rales or rhonchi, symmetric air entry  Heart - normal rate, regular rhythm, normal S1, S2, no murmurs, rubs, clicks or gallops  Neurological - alert, oriented, no focal findings or movement disorder noted    Recent Results (from the past 12 hour(s))   AMB POC GLUCOSE BLOOD, BY GLUCOSE MONITORING DEVICE    Collection Time: 12/05/17  9:31 AM   Result Value Ref Range    Glucose  Non fasting mg/dL       Assessment/ Plan:   Diagnoses and all orders for this visit:    1. Type 2 diabetes mellitus without complication, without long-term current use of insulin ([facility])  -     AMB POC GLUCOSE BLOOD, BY GLUCOSE MONITORING DEVICE    2. Chronic obstructive pulmonary disease, unspecified [facility] type ([facility]  -     XR CHEST PA LAT; Future         ?  COPD control vs cardiac? Mult potential causes of increasing PERRY. Last CXR from 2011. Refer for plain film. Change POC if imaging is grossly abnormal.     RTC in 4-6 weeks for a medicare wellness visit to discuss multiple pending  topics. Advised to bring in his Rx bottles. HTN in good control. I have discussed the diagnosis with the patient and the intended plan as seen in the above orders. The patient has received an after-visit summary and questions were answered concerning future plans. I have discussed medication side effects and warnings with the patient as well. The patient verbalizes understanding and agreement with the plan.     Follow-up Disposition: Not on File

## 2017-12-14 RX ORDER — METOPROLOL TARTRATE 50 MG/1
TABLET ORAL
Qty: 180 TAB | Refills: 1 | Status: SHIPPED | OUTPATIENT
Start: 2017-12-14 | End: 2018-05-08 | Stop reason: SDUPTHER

## 2017-12-19 ENCOUNTER — CLINICAL SUPPORT (OUTPATIENT)
Dept: FAMILY MEDICINE CLINIC | Age: 67
End: 2017-12-19

## 2017-12-19 DIAGNOSIS — N52.9 ERECTILE DYSFUNCTION, UNSPECIFIED ERECTILE DYSFUNCTION TYPE: Primary | ICD-10-CM

## 2017-12-21 DIAGNOSIS — M62.830 BACK SPASM: ICD-10-CM

## 2017-12-21 RX ORDER — CYCLOBENZAPRINE HCL 10 MG
TABLET ORAL
Qty: 30 TAB | Refills: 1 | Status: SHIPPED | OUTPATIENT
Start: 2017-12-21 | End: 2018-03-28 | Stop reason: ALTCHOICE

## 2017-12-21 NOTE — TELEPHONE ENCOUNTER
Refill fax request from [facility] received for cyclobenzaprine 10mg tablet. lov October 2017.

## 2018-01-18 ENCOUNTER — APPOINTMENT (OUTPATIENT)
Dept: GENERAL RADIOLOGY | Age: 68
End: 2018-01-18
Attending: EMERGENCY MEDICINE
Payer: MEDICARE

## 2018-01-18 ENCOUNTER — HOSPITAL ENCOUNTER (EMERGENCY)
Age: 68
Discharge: HOME | End: 2018-01-18
Attending: EMERGENCY MEDICINE
Payer: MEDICARE

## 2018-01-18 VITALS
SYSTOLIC BLOOD PRESSURE: 129 MMHG | HEART RATE: 92 BPM | OXYGEN SATURATION: 96 % | DIASTOLIC BLOOD PRESSURE: 62 MMHG | BODY MASS INDEX: 36.34 KG/M2 | RESPIRATION RATE: 12 BRPM | TEMPERATURE: 99.5 F | HEIGHT: 69 IN | WEIGHT: 245.37 LBS

## 2018-01-18 DIAGNOSIS — J10.1 INFLUENZA B: Primary | ICD-10-CM

## 2018-01-18 DIAGNOSIS — J44.1 COPD EXACERBATION (HCC): ICD-10-CM

## 2018-01-18 LAB
ALBUMIN SERPL-MCNC: 2.7 G/DL (ref 3.5–5)
ALBUMIN/GLOB SERPL: 0.6 {RATIO} (ref 1.1–2.2)
ALP SERPL-CCNC: 68 U/L (ref 45–117)
ALT SERPL-CCNC: 41 U/L (ref 12–78)
ANION GAP SERPL CALC-SCNC: 8 MMOL/L (ref 5–15)
AST SERPL-CCNC: 64 U/L (ref 15–37)
ATRIAL RATE: 105 BPM
BASOPHILS # BLD: 0 K/UL (ref 0–0.1)
BASOPHILS NFR BLD: 0 % (ref 0–1)
BILIRUB SERPL-MCNC: 2.1 MG/DL (ref 0.2–1)
BNP SERPL-MCNC: 132 PG/ML (ref 0–125)
BUN SERPL-MCNC: 18 MG/DL (ref 6–20)
BUN/CREAT SERPL: 13 (ref 12–20)
CALCIUM SERPL-MCNC: 8.7 MG/DL (ref 8.5–10.1)
CALCULATED P AXIS, ECG09: 64 DEGREES
CALCULATED R AXIS, ECG10: -48 DEGREES
CALCULATED T AXIS, ECG11: 61 DEGREES
CHLORIDE SERPL-SCNC: 104 MMOL/L (ref 97–108)
CO2 SERPL-SCNC: 25 MMOL/L (ref 21–32)
CREAT SERPL-MCNC: 1.43 MG/DL (ref 0.7–1.3)
DIAGNOSIS, 93000: NORMAL
EOSINOPHIL # BLD: 0 K/UL (ref 0–0.4)
EOSINOPHIL NFR BLD: 1 % (ref 0–7)
ERYTHROCYTE [DISTWIDTH] IN BLOOD BY AUTOMATED COUNT: 14.5 % (ref 11.5–14.5)
FLUAV AG NPH QL IA: POSITIVE
FLUBV AG NOSE QL IA: NEGATIVE
GLOBULIN SER CALC-MCNC: 4.3 G/DL (ref 2–4)
GLUCOSE SERPL-MCNC: 104 MG/DL (ref 65–100)
HCT VFR BLD AUTO: 39.3 % (ref 36.6–50.3)
HGB BLD-MCNC: 13.5 G/DL (ref 12.1–17)
LYMPHOCYTES # BLD: 0.9 K/UL (ref 0.8–3.5)
LYMPHOCYTES NFR BLD: 16 % (ref 12–49)
MCH RBC QN AUTO: 33.8 PG (ref 26–34)
MCHC RBC AUTO-ENTMCNC: 34.4 G/DL (ref 30–36.5)
MCV RBC AUTO: 98.3 FL (ref 80–99)
MONOCYTES # BLD: 0.7 K/UL (ref 0–1)
MONOCYTES NFR BLD: 13 % (ref 5–13)
NEUTS SEG # BLD: 3.8 K/UL (ref 1.8–8)
NEUTS SEG NFR BLD: 70 % (ref 32–75)
P-R INTERVAL, ECG05: 194 MS
PLATELET # BLD AUTO: 73 K/UL (ref 150–400)
POTASSIUM SERPL-SCNC: 3.5 MMOL/L (ref 3.5–5.1)
PROT SERPL-MCNC: 7 G/DL (ref 6.4–8.2)
Q-T INTERVAL, ECG07: 362 MS
QRS DURATION, ECG06: 98 MS
QTC CALCULATION (BEZET), ECG08: 478 MS
RBC # BLD AUTO: 4 M/UL (ref 4.1–5.7)
SODIUM SERPL-SCNC: 137 MMOL/L (ref 136–145)
TROPONIN I SERPL-MCNC: <0.04 NG/ML
VENTRICULAR RATE, ECG03: 105 BPM
WBC # BLD AUTO: 5.4 K/UL (ref 4.1–11.1)

## 2018-01-18 PROCEDURE — 94640 AIRWAY INHALATION TREATMENT: CPT

## 2018-01-18 PROCEDURE — 99284 EMERGENCY DEPT VISIT MOD MDM: CPT

## 2018-01-18 PROCEDURE — 74011250636 HC RX REV CODE- 250/636: Performed by: EMERGENCY MEDICINE

## 2018-01-18 PROCEDURE — 77030029684 HC NEB SM VOL KT MONA -A

## 2018-01-18 PROCEDURE — 74011000250 HC RX REV CODE- 250: Performed by: EMERGENCY MEDICINE

## 2018-01-18 PROCEDURE — 71046 X-RAY EXAM CHEST 2 VIEWS: CPT

## 2018-01-18 PROCEDURE — 87804 INFLUENZA ASSAY W/OPTIC: CPT | Performed by: EMERGENCY MEDICINE

## 2018-01-18 PROCEDURE — 83880 ASSAY OF NATRIURETIC PEPTIDE: CPT | Performed by: EMERGENCY MEDICINE

## 2018-01-18 PROCEDURE — 36415 COLL VENOUS BLD VENIPUNCTURE: CPT | Performed by: EMERGENCY MEDICINE

## 2018-01-18 PROCEDURE — 85025 COMPLETE CBC W/AUTO DIFF WBC: CPT | Performed by: EMERGENCY MEDICINE

## 2018-01-18 PROCEDURE — 93005 ELECTROCARDIOGRAM TRACING: CPT

## 2018-01-18 PROCEDURE — 80053 COMPREHEN METABOLIC PANEL: CPT | Performed by: EMERGENCY MEDICINE

## 2018-01-18 PROCEDURE — 96374 THER/PROPH/DIAG INJ IV PUSH: CPT

## 2018-01-18 PROCEDURE — 84484 ASSAY OF TROPONIN QUANT: CPT | Performed by: EMERGENCY MEDICINE

## 2018-01-18 RX ORDER — PREDNISONE 20 MG/1
20 TABLET ORAL 2 TIMES DAILY
Qty: 10 TAB | Refills: 0 | Status: SHIPPED | OUTPATIENT
Start: 2018-01-18 | End: 2018-01-23

## 2018-01-18 RX ORDER — IPRATROPIUM BROMIDE AND ALBUTEROL SULFATE 2.5; .5 MG/3ML; MG/3ML
3 SOLUTION RESPIRATORY (INHALATION) ONCE
Status: COMPLETED | OUTPATIENT
Start: 2018-01-18 | End: 2018-01-18

## 2018-01-18 RX ORDER — OSELTAMIVIR PHOSPHATE 30 MG/1
30 CAPSULE ORAL 2 TIMES DAILY
Qty: 10 CAP | Refills: 0 | Status: SHIPPED | OUTPATIENT
Start: 2018-01-18 | End: 2018-01-26

## 2018-01-18 RX ORDER — PREDNISONE 20 MG/1
20 TABLET ORAL 2 TIMES DAILY
Qty: 10 TAB | Refills: 0 | Status: SHIPPED | OUTPATIENT
Start: 2018-01-18 | End: 2018-01-18

## 2018-01-18 RX ORDER — SODIUM CHLORIDE 0.9 % (FLUSH) 0.9 %
5-10 SYRINGE (ML) INJECTION AS NEEDED
Status: DISCONTINUED | OUTPATIENT
Start: 2018-01-18 | End: 2018-01-18 | Stop reason: HOSPADM

## 2018-01-18 RX ORDER — SODIUM CHLORIDE 0.9 % (FLUSH) 0.9 %
5-10 SYRINGE (ML) INJECTION EVERY 8 HOURS
Status: DISCONTINUED | OUTPATIENT
Start: 2018-01-18 | End: 2018-01-18 | Stop reason: HOSPADM

## 2018-01-18 RX ADMIN — METHYLPREDNISOLONE SODIUM SUCCINATE 125 MG: 125 INJECTION, POWDER, FOR SOLUTION INTRAMUSCULAR; INTRAVENOUS at 12:40

## 2018-01-18 RX ADMIN — IPRATROPIUM BROMIDE AND ALBUTEROL SULFATE 3 ML: .5; 3 SOLUTION RESPIRATORY (INHALATION) at 12:40

## 2018-01-18 NOTE — DISCHARGE INSTRUCTIONS
Chronic Obstructive Pulmonary Disease (COPD): Care Instructions  Your Care Instructions    Chronic obstructive pulmonary disease (COPD) is a general term for a group of lung diseases, including emphysema and chronic bronchitis. People with COPD have decreased airflow in and out of the lungs, which makes it hard to breathe. The airways also can get clogged with thick mucus. Cigarette smoking is a major cause of COPD. Although there is no cure for COPD, you can slow its progress. Following your treatment plan and taking care of yourself can help you feel better and live longer. Follow-up care is a key part of your treatment and safety. Be sure to make and go to all appointments, and call your doctor if you are having problems. It's also a good idea to know your test results and keep a list of the medicines you take. How can you care for yourself at home? ?Staying healthy  ? · Do not smoke. This is the most important step you can take to prevent more damage to your lungs. If you need help quitting, talk to your doctor about stop-smoking programs and medicines. These can increase your chances of quitting for good. ? · Avoid colds and flu. Get a pneumococcal vaccine shot. If you have had one before, ask your doctor whether you need a second dose. Get the flu vaccine every fall. If you must be around people with colds or the flu, wash your hands often. ? · Avoid secondhand smoke, air pollution, and high altitudes. Also avoid cold, dry air and hot, humid air. Stay at home with your windows closed when air pollution is bad. ?Medicines and oxygen therapy  ? · Take your medicines exactly as prescribed. Call your doctor if you think you are having a problem with your medicine. ? · You may be taking medicines such as:  ¨ Bronchodilators. These help open your airways and make breathing easier. Bronchodilators are either short-acting (work for 6 to 9 hours) or long-acting (work for 24 hours).  You inhale most bronchodilators, so they start to act quickly. Always carry your quick-relief inhaler with you in case you need it while you are away from home. ¨ Corticosteroids (prednisone, budesonide). These reduce airway inflammation. They come in pill or inhaled form. You must take these medicines every day for them to work well. ? · A spacer may help you get more inhaled medicine to your lungs. Ask your doctor or pharmacist if a spacer is right for you. If it is, ask how to use it properly. ? · Do not take any vitamins, over-the-counter medicine, or herbal products without talking to your doctor first.   ? · If your doctor prescribed antibiotics, take them as directed. Do not stop taking them just because you feel better. You need to take the full course of antibiotics. ? · Oxygen therapy boosts the amount of oxygen in your blood and helps you breathe easier. Use the flow rate your doctor has recommended, and do not change it without talking to your doctor first.   Activity  ? · Get regular exercise. Walking is an easy way to get exercise. Start out slowly, and walk a little more each day. ? · Pay attention to your breathing. You are exercising too hard if you cannot talk while you are exercising. ? · Take short rest breaks when doing household chores and other activities. ? · Learn breathing methods-such as breathing through pursed lips-to help you become less short of breath. ? · If your doctor has not set you up with a pulmonary rehabilitation program, talk to him or her about whether rehab is right for you. Rehab includes exercise programs, education about your disease and how to manage it, help with diet and other changes, and emotional support. Diet  ? · Eat regular, healthy meals. Use bronchodilators about 1 hour before you eat to make it easier to eat. Eat several small meals instead of three large ones. Drink beverages at the end of the meal. Avoid foods that are hard to chew.    ? · Eat foods that contain protein so that you do not lose muscle mass. ? · Talk with your doctor if you gain too much weight or if you lose weight without trying. ?Mental health  ? · Talk to your family, friends, or a therapist about your feelings. It is normal to feel frightened, angry, hopeless, helpless, and even guilty. Talking openly about bad feelings can help you cope. If these feelings last, talk to your doctor. When should you call for help? Call 911 anytime you think you may need emergency care. For example, call if:  ? · You have severe trouble breathing. ?Call your doctor now or seek immediate medical care if:  ? · You have new or worse trouble breathing. ? · You cough up blood. ? · You have a fever. ? Watch closely for changes in your health, and be sure to contact your doctor if:  ? · You cough more deeply or more often, especially if you notice more mucus or a change in the color of your mucus. ? · You have new or worse swelling in your legs or belly. ? · You are not getting better as expected. Where can you learn more? Go to http://buffy-christiano.info/. Silvia Clark in the search box to learn more about \"Chronic Obstructive Pulmonary Disease (COPD): Care Instructions. \"  Current as of: May 12, 2017  Content Version: 11.4  © 2415-5705 [employer]. Care instructions adapted under license by [employer] (which disclaims liability or warranty for this information). If you have questions about a medical condition or this instruction, always ask your healthcare professional. [place] [address] any warranty or liability for your use of this information. Influenza (Flu): Care Instructions  Your Care Instructions    Influenza (flu) is an infection in the lungs and breathing passages. It is caused by the influenza virus. There are different strains, or types, of the flu virus from year to year.  Unlike the common cold, the flu comes on suddenly and the symptoms, such as a cough, congestion, fever, chills, fatigue, aches, and pains, are more severe. These symptoms may last up to 10 days. Although the flu can make you feel very sick, it usually doesn't cause serious health problems. Home treatment is usually all you need for flu symptoms. But your doctor may prescribe antiviral medicine to prevent other health problems, such as pneumonia, from developing. Older people and those who have a long-term health condition, such as lung disease, are most at risk for having pneumonia or other health problems. Follow-up care is a key part of your treatment and safety. Be sure to make and go to all appointments, and call your doctor if you are having problems. It's also a good idea to know your test results and keep a list of the medicines you take. How can you care for yourself at home? · Get plenty of rest.  · Drink plenty of fluids, enough so that your urine is light yellow or clear like water. If you have kidney, heart, or liver disease and have to limit fluids, talk with your doctor before you increase the amount of fluids you drink. · Take an over-the-counter pain medicine if needed, such as acetaminophen (Tylenol), ibuprofen (Advil, Motrin), or naproxen (Aleve), to relieve fever, headache, and muscle aches. Read and follow all instructions on the label. No one younger than 20 should take aspirin. It has been linked to Reye syndrome, a serious illness. · Do not smoke. Smoking can make the flu worse. If you need help quitting, talk to your doctor about stop-smoking programs and medicines. These can increase your chances of quitting for good. · Breathe moist air from a hot shower or from a sink filled with hot water to help clear a stuffy nose. · Before you use cough and cold medicines, check the label. These medicines may not be safe for young children or for people with certain health problems.   · If the skin around your nose and lips becomes sore, put some petroleum jelly on the area. · To ease coughing:  ¨ Drink fluids to soothe a scratchy throat. ¨ Suck on cough drops or plain hard candy. ¨ Take an over-the-counter cough medicine that contains dextromethorphan to help you get some sleep. Read and follow all instructions on the label. ¨ Raise your head at night with an extra pillow. This may help you rest if coughing keeps you awake. · Take any prescribed medicine exactly as directed. Call your doctor if you think you are having a problem with your medicine. To avoid spreading the flu  · Wash your hands regularly, and keep your hands away from your face. · Stay home from school, work, and other public places until you are feeling better and your fever has been gone for at least 24 hours. The fever needs to have gone away on its own without the help of medicine. · Ask people living with you to talk to their doctors about preventing the flu. They may get antiviral medicine to keep from getting the flu from you. · To prevent the flu in the future, get a flu vaccine every fall. Encourage people living with you to get the vaccine. · Cover your mouth when you cough or sneeze. When should you call for help? Call 911 anytime you think you may need emergency care. For example, call if:  ? · You have severe trouble breathing. ?Call your doctor now or seek immediate medical care if:  ? · You have new or worse trouble breathing. ? · You seem to be getting much sicker. ? · You feel very sleepy or confused. ? · You have a new or higher fever. ? · You get a new rash. ? Watch closely for changes in your health, and be sure to contact your doctor if:  ? · You begin to get better and then get worse. ? · You are not getting better after 1 week. Where can you learn more? Go to http://buffy-christiano.info/. Enter M905 in the search box to learn more about \"Influenza (Flu): Care Instructions. \"  Current as of:  May 12, 2017  Content Version: 11.4  © 7819-5963 [employer], [employer]. Care instructions adapted under license by [employer] (which disclaims liability or warranty for this information). If you have questions about a medical condition or this instruction, always ask your healthcare professional. [address] any warranty or liability for your use of this information. [employer] Activation    Thank you for requesting access to [facility]. Please follow the instructions below to securely access and download your online medical record. [employer] [facility] allows you to send messages to your doctor, view your test results, renew your prescriptions, schedule appointments, and more. How Do I Sign Up? 1. In your internet browser, go to [email or web address]  2. Click on the First Time User? Click Here link in the Sign In box. You will be redirect to the New Member Sign Up page. 3. Enter your [employer] Access Code exactly as it appears below. You will not need to use this code after youve completed the sign-up process. If you do not sign up before the expiration date, you must request a new code. [employer] Access Code: [email or web address]  Expires: 2018  9:28 AM (This is the date your [employer] access code will )    4. Enter the last four digits of your Social Security Number (xxxx) and Date of Birth (mm/dd/yyyy) as indicated and click Submit. You will be taken to the next sign-up page. 5. Create a [employer] ID. This will be your [employer] login ID and cannot be changed, so think of one that is secure and easy to remember. 6. Create a [employer] password. You can change your password at any time. 7. Enter your Password Reset Question and Answer. This can be used at a later time if you forget your password. 8. Enter your e-mail address. You will receive e-mail notification when new information is available in [address] 9. Click Sign Up. You can now view and download portions of your medical record.   10. Click the Download Summary menu link to download a portable copy of your medical information. Additional Information    If you have questions, please visit the Frequently Asked Questions section of the [employer] website at https://[employer]. [facility]. [employer] Remember, [employer] is NOT to be used for urgent needs. For medical emergencies, dial 911.

## 2018-01-18 NOTE — ED NOTES
Assumed care of patient. Patient is alert and oriented, does not appear to be in distress. Patient ambulatory to ED with c/o coughing up blood, fever, and headache x 3 days. Pt has h/o COPD.

## 2018-01-18 NOTE — ED NOTES
Dr. Dalton De La Cruz has reviewed discharge instructions with the patient. The patient verbalized understanding. Pt refusing wheelchair out, ambulatory home with son, discharge papers and prescriptions in hand.

## 2018-01-18 NOTE — ED PROVIDER NOTES
EMERGENCY DEPARTMENT HISTORY AND PHYSICAL EXAM      Date: 1/18/2018  Patient Name: Vania Casanova    History of Presenting Illness     Chief Complaint   Patient presents with    Cough     pt c/o chronic cough due to COPD that has been worse over the past 2 days with blood tinged sputum and lower grade fevers. History Provided By: Patient    HPI: Vania Casanova, 78 y.o. male with PMHx significant for DM, COPD, HTN, and thrombocytopenia, presents ambulatory to the ED with cc of cough producing bloody sputum x ~2 days. Pt also reports mild dizziness when standing since onset of his symptoms. He notes he had fever ~101 ~2 days ago, which he since resolved, and c/o fatigue since initial onset of his symptoms. Pt denies hx of CHF, MI, PE, or DVT. He denies recent use of antibiotics or steroids. Pt specifically denies chills, ABD pain, nausea, or vomiting. He denies tobacco use. PCP: Derek Linares MD    There are no other complaints, changes, or physical findings at this time. Current Facility-Administered Medications   Medication Dose Route Frequency Provider Last Rate Last Dose    sodium chloride (NS) flush 5-10 mL  5-10 mL IntraVENous Q8H Deyvi Christianson MD        sodium chloride (NS) flush 5-10 mL  5-10 mL IntraVENous PRN Nicol Davis MD         Current Outpatient Prescriptions   Medication Sig Dispense Refill    oseltamivir (TAMIFLU) 30 mg capsule Take 1 Cap by mouth two (2) times a day. 10 Cap 0    predniSONE (DELTASONE) 20 mg tablet Take 1 Tab by mouth two (2) times a day for 10 doses. With lopez 10 Tab 0    cyclobenzaprine (FLEXERIL) 10 mg tablet TAKE ONE TABLET BY MOUTH THREE TIMES A DAY AS NEEDED FOR MUSCLE SPASMS 30 Tab 1    metoprolol tartrate (LOPRESSOR) 50 mg tablet TAKE ONE TABLET BY MOUTH TWICE A  Tab 1    VIAGRA 100 mg tablet Take 1 Tab by mouth as needed.  30 Tab 3    metFORMIN ER (GLUCOPHAGE XR) 500 mg tablet TAKE ONE TABLET BY MOUTH DAILY 90 Tab 1    lovastatin (MEVACOR) 20 mg tablet Take 1 Tab by mouth nightly. 90 Tab 1    levothyroxine (SYNTHROID) 88 mcg tablet TAKE ONE TABLET BY MOUTH EVERY MORNING BEFORE BREAKFAST 90 Tab 3    terazosin (HYTRIN) 1 mg capsule Take 1 Cap by mouth nightly. 30 Cap 3    losartan-hydroCHLOROthiazide (HYZAAR) 100-25 mg per tablet Take 1 Tab by mouth daily. 90 Tab 3    albuterol (PROVENTIL VENTOLIN) 2.5 mg /3 mL (0.083 %) nebulizer solution 3 mL by Nebulization route every four (4) hours as needed for Wheezing. 180 Each 5    ipratropium (ATROVENT) 0.02 % nebulizer solution 2.5 mL by Nebulization route four (4) times daily as needed for Wheezing (shortness of breath). 180 mL 5    fluticasone-salmeterol (ADVAIR DISKUS) 500-50 mcg/dose diskus inhaler Take 1 Puff by inhalation every twelve (12) hours.  tiotropium (SPIRIVA WITH HANDIHALER) 18 mcg inhalation capsule Take 1 Cap by inhalation daily.          Past History     Past Medical History:  Past Medical History:   Diagnosis Date    Blurred vision     Cirrhosis, alcoholic ([address]     COPD bronchitis     Diabetes mellitus ([address]     Ear discomfort     History of ETOH abuse     HTN (hypertension)     SOB (shortness of breath)     Thrombocytopenia (HCC)     Trouble in sleeping        Past Surgical History:  Past Surgical History:   Procedure Laterality Date    HX CATARACT REMOVAL Right     HX CATARACT REMOVAL Left     HX CIRCUMCISION      at 24 y/o    HX HERNIA REPAIR  as a youth    right side    HX SHOULDER ARTHROSCOPY      left x 3 for torn rotator cuff muscle       Family History:  Family History   Problem Relation Age of Onset    Diabetes Neg Hx     Hypertension Father     Heart Disease Father      pacemaker,  of MI @ 80    Hypertension Mother     Cancer Mother      breast with spread to bones       Social History:  Social History   Substance Use Topics    Smoking status: Former Smoker     Types: Cigarettes     Quit date: 2014    Smokeless tobacco: Former User    Alcohol use No      Comment: Quit Oct 21, 1985       Allergies: Allergies   Allergen Reactions    Bactrim [Sulfamethoprim Ds] Diarrhea and Nausea and Vomiting     After on 1st dose.  Morphine Unknown (comments)         Review of Systems   Review of Systems   Constitutional: Positive for fatigue and fever (resolved). Negative for chills. HENT: Negative. Negative for congestion, rhinorrhea and sinus pressure. Eyes: Negative. Negative for discharge and redness. Respiratory: Positive for cough. Negative for chest tightness and shortness of breath. +hemoptysis   Cardiovascular: Negative. Negative for chest pain. Gastrointestinal: Negative. Negative for abdominal pain and blood in stool. Endocrine: Negative. Genitourinary: Negative. Negative for flank pain and hematuria. Musculoskeletal: Negative. Negative for back pain. Skin: Negative. Negative for rash. Neurological: Positive for dizziness. Negative for seizures, weakness, numbness and headaches. Hematological: Negative. All other systems reviewed and are negative. Physical Exam   Physical Exam   Constitutional: He is oriented to person, place, and time. He appears well-developed and well-nourished. No distress. HENT:   Head: Normocephalic and atraumatic. Nose: Nose normal.   Mouth/Throat: No oropharyngeal exudate. Eyes: Conjunctivae and EOM are normal. Pupils are equal, round, and reactive to light. No scleral icterus. Neck: Normal range of motion. Neck supple. No JVD present. No thyromegaly present. Cardiovascular: Normal rate, regular rhythm, normal heart sounds, intact distal pulses and normal pulses. PMI is not displaced. Exam reveals no gallop and no friction rub. No murmur heard. Pulmonary/Chest: Effort normal. No stridor. No respiratory distress. He has no decreased breath sounds. He has wheezes. He has no rhonchi. He has no rales. He exhibits no tenderness.    Abdominal: Soft. Normal aorta and bowel sounds are normal. He exhibits no distension, no abdominal bruit, no pulsatile midline mass and no mass. There is no hepatosplenomegaly. There is no tenderness. There is no rebound, no guarding and no CVA tenderness. No hernia. Neurological: He is alert and oriented to person, place, and time. He has normal reflexes. He displays no atrophy and no tremor. No cranial nerve deficit or sensory deficit. He exhibits normal muscle tone. He displays no seizure activity. Coordination normal. GCS eye subscore is 4. GCS verbal subscore is 5. GCS motor subscore is 6. Reflex Scores:       Patellar reflexes are 2+ on the right side and 2+ on the left side. Skin: Skin is warm. No rash noted. He is not diaphoretic. No erythema. No pallor. Nursing note and vitals reviewed. Diagnostic Study Results     Labs -     Recent Results (from the past 12 hour(s))   CBC WITH AUTOMATED DIFF    Collection Time: 01/18/18 12:14 PM   Result Value Ref Range    WBC 5.4 4.1 - 11.1 K/uL    RBC 4.00 (L) 4.10 - 5.70 M/uL    HGB 13.5 12.1 - 17.0 g/dL    HCT 39.3 36.6 - 50.3 %    MCV 98.3 80.0 - 99.0 FL    MCH 33.8 26.0 - 34.0 PG    MCHC 34.4 30.0 - 36.5 g/dL    RDW 14.5 11.5 - 14.5 %    PLATELET 73 (L) 483 - 400 K/uL    NEUTROPHILS 70 32 - 75 %    LYMPHOCYTES 16 12 - 49 %    MONOCYTES 13 5 - 13 %    EOSINOPHILS 1 0 - 7 %    BASOPHILS 0 0 - 1 %    ABS. NEUTROPHILS 3.8 1.8 - 8.0 K/UL    ABS. LYMPHOCYTES 0.9 0.8 - 3.5 K/UL    ABS. MONOCYTES 0.7 0.0 - 1.0 K/UL    ABS. EOSINOPHILS 0.0 0.0 - 0.4 K/UL    ABS.  BASOPHILS 0.0 0.0 - 0.1 K/UL   METABOLIC PANEL, COMPREHENSIVE    Collection Time: 01/18/18 12:14 PM   Result Value Ref Range    Sodium 137 136 - 145 mmol/L    Potassium 3.5 3.5 - 5.1 mmol/L    Chloride 104 97 - 108 mmol/L    CO2 25 21 - 32 mmol/L    Anion gap 8 5 - 15 mmol/L    Glucose 104 (H) 65 - 100 mg/dL    BUN 18 6 - 20 MG/DL    Creatinine 1.43 (H) 0.70 - 1.30 MG/DL    BUN/Creatinine ratio 13 12 - 20      GFR est AA 60 (L) >60 ml/min/1.73m2    GFR est non-AA 49 (L) >60 ml/min/1.73m2    Calcium 8.7 8.5 - 10.1 MG/DL    Bilirubin, total 2.1 (H) 0.2 - 1.0 MG/DL    ALT (SGPT) 41 12 - 78 U/L    AST (SGOT) 64 (H) 15 - 37 U/L    Alk. phosphatase 68 45 - 117 U/L    Protein, total 7.0 6.4 - 8.2 g/dL    Albumin 2.7 (L) 3.5 - 5.0 g/dL    Globulin 4.3 (H) 2.0 - 4.0 g/dL    A-G Ratio 0.6 (L) 1.1 - 2.2     TROPONIN I    Collection Time: 01/18/18 12:14 PM   Result Value Ref Range    Troponin-I, Qt. <0.04 <0.05 ng/mL   NT-PRO BNP    Collection Time: 01/18/18 12:14 PM   Result Value Ref Range    NT pro- (H) 0 - 125 PG/ML   INFLUENZA A & B AG (RAPID TEST)    Collection Time: 01/18/18 12:45 PM   Result Value Ref Range    Influenza A Antigen POSITIVE (A) NEG      Influenza B Antigen NEGATIVE  NEG         Radiologic Studies -   CXR Results  (Last 48 hours)               01/18/18 1149  XR CHEST PA LAT Final result    Impression:  Impression: No acute process or change compared to the prior exam.           Narrative:  Exam:  2 view chest       Indication: Cough       Comparison to 2/2/2011. PA and lateral views demonstrate normal heart size. There is no acute process in   the lung fields. The osseous structures are unremarkable. Medical Decision Making   I am the first provider for this patient. I reviewed the vital signs, available nursing notes, past medical history, past surgical history, family history and social history. Vital Signs-Reviewed the patient's vital signs. Patient Vitals for the past 12 hrs:   Temp Pulse Resp BP SpO2   01/18/18 1300 - 92 12 129/62 96 %   01/18/18 1104 99.5 °F (37.5 °C) 97 18 112/65 96 %       Pulse Oximetry Analysis - 96% on RA    Cardiac Monitor:   Rate: 105 bpm  Rhythm: Sinus Tachycardia      EKG interpretation: 12:08  Rhythm: sinus tachycardia and left anterior fascicular block. Rate (approx.): 105;  Axis: normal; ME interval: normal; QRS interval: normal ; ST/T wave: normal.    Records Reviewed: Nursing Notes and Old Medical Records    Provider Notes (Medical Decision Making):     DDx: influenza, PNA, CHF, bronchitis, PE    Impression/Plan: hx of COPD, HTN to the ED with cough, congestion, subjective fever, and blood streaks in his sputum. Pt did have flu vaccine, but test positive for influenza B. XR has no PNA. Has no other signs to suggest PE. Will treat with Tamiflu, reduce dose due to renal function, and steroids with follow up to PCP as needed. ED Course:   Initial assessment performed. The patients presenting problems have been discussed, and they are in agreement with the care plan formulated and outlined with them. I have encouraged them to ask questions as they arise throughout their visit. Disposition:  DISCHARGE NOTE:  1:55 PM  The patient is ready for discharge. The patients signs, symptoms, diagnosis, and instructions for discharge have been discussed and the pt has conveyed their understanding. The patient is to follow up as recommended or return to the ER should their symptoms worsen. Plan has been discussed and patient has conveyed their agreement. PLAN:  1. Current Discharge Medication List      START taking these medications    Details   oseltamivir (TAMIFLU) 30 mg capsule Take 1 Cap by mouth two (2) times a day. Qty: 10 Cap, Refills: 0      predniSONE (DELTASONE) 20 mg tablet Take 1 Tab by mouth two (2) times a day for 10 doses. With lopez  Qty: 10 Tab, Refills: 0           2. Follow-up Information     Follow up With Details Comments Contact Info    Julius Lira MD Schedule an appointment as soon as possible for a visit in 1 day  [phone]  [phone]      [facility] EMERGENCY DEPT  If symptoms worsen [address] [place] [address]  [phone]        Return to ED if worse     Diagnosis     Clinical Impression:   1. Influenza B    2. COPD exacerbation ([facility]        Attestations:     This note is prepared by Naomi Raya, acting as Scribe for Kell Espinoza MD.    Kell Espinoza MD: The scribe's documentation has been prepared under my direction and personally reviewed by me in its entirety. I confirm that the note above accurately reflects all work, treatment, procedures, and medical decision making performed by me.

## 2018-01-26 ENCOUNTER — OFFICE VISIT (OUTPATIENT)
Dept: FAMILY MEDICINE CLINIC | Age: 68
End: 2018-01-26

## 2018-01-26 VITALS
SYSTOLIC BLOOD PRESSURE: 125 MMHG | OXYGEN SATURATION: 96 % | TEMPERATURE: 97.7 F | BODY MASS INDEX: 35.74 KG/M2 | HEART RATE: 74 BPM | DIASTOLIC BLOOD PRESSURE: 63 MMHG | WEIGHT: 242 LBS

## 2018-01-26 DIAGNOSIS — E11.9 TYPE 2 DIABETES MELLITUS WITHOUT COMPLICATION, WITHOUT LONG-TERM CURRENT USE OF INSULIN (HCC): Primary | ICD-10-CM

## 2018-01-26 DIAGNOSIS — J42 CHRONIC BRONCHITIS, UNSPECIFIED CHRONIC BRONCHITIS TYPE (HCC): ICD-10-CM

## 2018-01-26 DIAGNOSIS — J40 BRONCHITIS: ICD-10-CM

## 2018-01-26 PROBLEM — E11.21 TYPE 2 DIABETES MELLITUS WITH NEPHROPATHY (HCC): Status: ACTIVE | Noted: 2018-01-26

## 2018-01-26 LAB — GLUCOSE POC: NORMAL MG/DL

## 2018-01-26 RX ORDER — AZITHROMYCIN 250 MG/1
TABLET, FILM COATED ORAL
Qty: 6 TAB | Refills: 0 | Status: SHIPPED | OUTPATIENT
Start: 2018-01-26 | End: 2018-02-01

## 2018-01-26 RX ORDER — PREDNISONE 20 MG/1
TABLET ORAL
Qty: 18 TAB | Refills: 0 | Status: SHIPPED | OUTPATIENT
Start: 2018-01-26 | End: 2018-02-15 | Stop reason: ALTCHOICE

## 2018-01-26 NOTE — PROGRESS NOTES
Coordination of Care  1. Have you been to the ER, urgent care clinic since your last visit? Hospitalized since your last visit? Yes When: 1/18/18  [facility]    2. Have you seen or consulted any other health care providers outside of the [address] [place] [address] since your last visit? Include any pap smears or colon screening. No    Medications  Does the patient need refills?  YES    Learning Assessment Complete? yes  Results for orders placed or performed in visit on 01/26/18   AMB POC GLUCOSE BLOOD, BY GLUCOSE MONITORING DEVICE   Result Value Ref Range    Glucose  non fasting mg/dL

## 2018-01-26 NOTE — PATIENT INSTRUCTIONS
Bronchitis: Care Instructions  Your Care Instructions    Bronchitis is inflammation of the bronchial tubes, which carry air to the lungs. The tubes swell and produce mucus, or phlegm. The mucus and inflamed bronchial tubes make you cough. You may have trouble breathing. Most cases of bronchitis are caused by viruses like those that cause colds. Antibiotics usually do not help and they may be harmful. Bronchitis usually develops rapidly and lasts about 2 to 3 weeks in otherwise healthy people. Follow-up care is a key part of your treatment and safety. Be sure to make and go to all appointments, and call your doctor if you are having problems. It's also a good idea to know your test results and keep a list of the medicines you take. How can you care for yourself at home? · Take all medicines exactly as prescribed. Call your doctor if you think you are having a problem with your medicine. · Get some extra rest.  · Take an over-the-counter pain medicine, such as acetaminophen (Tylenol), ibuprofen (Advil, Motrin), or naproxen (Aleve) to reduce fever and relieve body aches. Read and follow all instructions on the label. · Do not take two or more pain medicines at the same time unless the doctor told you to. Many pain medicines have acetaminophen, which is Tylenol. Too much acetaminophen (Tylenol) can be harmful. · Take an over-the-counter cough medicine that contains dextromethorphan to help quiet a dry, hacking cough so that you can sleep. Avoid cough medicines that have more than one active ingredient. Read and follow all instructions on the label. · Breathe moist air from a humidifier, hot shower, or sink filled with hot water. The heat and moisture will thin mucus so you can cough it out. · Do not smoke. Smoking can make bronchitis worse. If you need help quitting, talk to your doctor about stop-smoking programs and medicines. These can increase your chances of quitting for good.   When should you call for help? Call 911 anytime you think you may need emergency care. For example, call if:  ? · You have severe trouble breathing. ?Call your doctor now or seek immediate medical care if:  ? · You have new or worse trouble breathing. ? · You cough up dark brown or bloody mucus (sputum). ? · You have a new or higher fever. ? · You have a new rash. ? Watch closely for changes in your health, and be sure to contact your doctor if:  ? · You cough more deeply or more often, especially if you notice more mucus or a change in the color of your mucus. ? · You are not getting better as expected. Where can you learn more? Go to http://buffy-christiano.info/. Enter H333 in the search box to learn more about \"Bronchitis: Care Instructions. \"  Current as of: May 12, 2017  Content Version: 11.4  © 6155-5256 [employer]. Care instructions adapted under license by [employer] (which disclaims liability or warranty for this information). If you have questions about a medical condition or this instruction, always ask your healthcare professional. [address] any warranty or liability for your use of this information.

## 2018-01-26 NOTE — MR AVS SNAPSHOT
[address] [place] [address] [address] 
[phone] Patient: Maki Barrios MRN: [id number]:[id number] Visit Information Date & Time Provider Department Dept. Phone Encounter #  
 1/26/2018 10:50 AM Stef[facility] Arita [address], [address] [phone] [id number] Follow-up Instructions Return in about 2 weeks (around 2/9/2018). Upcoming Health Maintenance Date Due COLONOSCOPY 2/19/1969 DTaP/Tdap/Td series (1 - Tdap) 2/19/1972 ZOSTER VACCINE AGE 60> 12/19/2010 EYE EXAM RETINAL OR DILATED Q1 7/1/2014 GLAUCOMA SCREENING Q2Y 2/19/2016 FOOT EXAM Q1 10/13/2016 MEDICARE YEARLY EXAM 8/11/2017 HEMOGLOBIN A1C Q6M 12/21/2017 MICROALBUMIN Q1 6/21/2018 LIPID PANEL Q1 10/25/2018 Allergies as of 1/26/2018  Review Complete On: 1/26/2018 By: Rosa Byers Severity Noted Reaction Type Reactions Bactrim [Sulfamethoprim Ds]  12/03/2015    Diarrhea, Nausea and Vomiting After on 1st dose. Morphine  08/15/2011    Unknown (comments) Current Immunizations  Reviewed on 10/25/2017 Name Date Influenza Vaccine 10/22/2014, 1/24/2014, 10/1/2012 Influenza Vaccine (Quad) PF 10/25/2017, 12/28/2016, 10/22/2015 Pneumococcal Polysaccharide (PPSV-23) 12/28/2016 Pneumococcal Vaccine (Unspecified Type) 1/1/2010 Not reviewed this visit You Were Diagnosed With   
  
 Codes Comments Type 2 diabetes mellitus without complication, without long-term current use of insulin (HCC)    -  Primary ICD-10-CM: E11.9 ICD-9-CM: 250.00 Chronic bronchitis, unspecified chronic bronchitis type ([facility]     ICD-10-CM: T43 ICD-9-CM: 491.9 Bronchitis     ICD-10-CM: J40 ICD-9-CM: 331 Vitals BP Pulse Temp Weight(growth percentile) SpO2 BMI  
 125/63 (BP 1 Location: Left arm, BP Patient Position: Sitting) 74 97.7 °F (36.5 °C) (Oral) 242 lb (109.8 kg) 96% 35.74 kg/m2 Smoking Status Former Smoker Vitals History BMI and BSA Data Body Mass Index Body Surface Area 35.74 kg/m 2 2.31 m 2 Preferred Pharmacy Pharmacy Name Phone [facility] [address], [address] [facility] [phone] Your Updated Medication List  
  
   
This list is accurate as of: 18 11:35 AM.  Always use your most recent med list.  
  
  
  
  
 ADVAIR DISKUS 500-50 mcg/dose diskus inhaler Generic drug:  fluticasone-salmeterol Take 1 Puff by inhalation every twelve (12) hours. albuterol 2.5 mg /3 mL (0.083 %) nebulizer solution Commonly known as:  PROVENTIL VENTOLIN  
3 mL by Nebulization route every four (4) hours as needed for Wheezing. azithromycin 250 mg tablet Commonly known as:  Margaretann Palma Si po today then 1 a day  
  
 cyclobenzaprine 10 mg tablet Commonly known as:  FLEXERIL  
TAKE ONE TABLET BY MOUTH THREE TIMES A DAY AS NEEDED FOR MUSCLE SPASMS  
  
 ipratropium 0.02 % Soln Commonly known as:  ATROVENT  
2.5 mL by Nebulization route four (4) times daily as needed for Wheezing (shortness of breath). levothyroxine 88 mcg tablet Commonly known as:  SYNTHROID  
TAKE ONE TABLET BY MOUTH EVERY MORNING BEFORE BREAKFAST  
  
 losartan-hydroCHLOROthiazide 100-25 mg per tablet Commonly known as:  HYZAAR Take 1 Tab by mouth daily. lovastatin 20 mg tablet Commonly known as:  MEVACOR Take 1 Tab by mouth nightly. metFORMIN  mg tablet Commonly known as:  GLUCOPHAGE XR  
TAKE ONE TABLET BY MOUTH DAILY  
  
 metoprolol tartrate 50 mg tablet Commonly known as:  LOPRESSOR  
TAKE ONE TABLET BY MOUTH TWICE A DAY  
  
 predniSONE 20 mg tablet Commonly known as:  [place] Sig: 3 po for 3 days, then 2 po for 3 days then 1 po for 3 days SPIRIVA WITH HANDIHALER 18 mcg inhalation capsule Generic drug:  tiotropium Take 1 Cap by inhalation daily. terazosin 1 mg capsule Commonly known as:  HYTRIN Take 1 Cap by mouth nightly. VIAGRA 100 mg tablet Generic drug:  sildenafil citrate Take 1 Tab by mouth as needed. Prescriptions Sent to Pharmacy Refills  
 azithromycin (ZITHROMAX) 250 mg tablet 0 Sig: Si po today then 1 a day Class: Normal  
 Pharmacy: [address], [address] [place] [address] Ph #: [phone]  
 predniSONE (DELTASONE) 20 mg tablet 0 Sig: Sig: 3 po for 3 days, then 2 po for 3 days then 1 po for 3 days Class: Normal  
 Pharmacy: [address], [address] [place] [address] Ph #: [phone] We Performed the Following AMB POC GLUCOSE BLOOD, BY GLUCOSE MONITORING DEVICE [72419 CPT(R)] Follow-up Instructions Return in about 2 weeks (around 2018). Patient Instructions Bronchitis: Care Instructions Your Care Instructions Bronchitis is inflammation of the bronchial tubes, which carry air to the lungs. The tubes swell and produce mucus, or phlegm. The mucus and inflamed bronchial tubes make you cough. You may have trouble breathing. Most cases of bronchitis are caused by viruses like those that cause colds. Antibiotics usually do not help and they may be harmful. Bronchitis usually develops rapidly and lasts about 2 to 3 weeks in otherwise healthy people. Follow-up care is a key part of your treatment and safety. Be sure to make and go to all appointments, and call your doctor if you are having problems. It's also a good idea to know your test results and keep a list of the medicines you take. How can you care for yourself at home? · Take all medicines exactly as prescribed. Call your doctor if you think you are having a problem with your medicine.  
· Get some extra rest. 
· Take an over-the-counter pain medicine, such as acetaminophen (Tylenol), ibuprofen (Advil, Motrin), or naproxen (Aleve) to reduce fever and relieve body aches. Read and follow all instructions on the label. · Do not take two or more pain medicines at the same time unless the doctor told you to. Many pain medicines have acetaminophen, which is Tylenol. Too much acetaminophen (Tylenol) can be harmful. · Take an over-the-counter cough medicine that contains dextromethorphan to help quiet a dry, hacking cough so that you can sleep. Avoid cough medicines that have more than one active ingredient. Read and follow all instructions on the label. · Breathe moist air from a humidifier, hot shower, or sink filled with hot water. The heat and moisture will thin mucus so you can cough it out. · Do not smoke. Smoking can make bronchitis worse. If you need help quitting, talk to your doctor about stop-smoking programs and medicines. These can increase your chances of quitting for good. When should you call for help? Call 911 anytime you think you may need emergency care. For example, call if: 
? · You have severe trouble breathing. ?Call your doctor now or seek immediate medical care if: 
? · You have new or worse trouble breathing. ? · You cough up dark brown or bloody mucus (sputum). ? · You have a new or higher fever. ? · You have a new rash. ? Watch closely for changes in your health, and be sure to contact your doctor if: 
? · You cough more deeply or more often, especially if you notice more mucus or a change in the color of your mucus. ? · You are not getting better as expected. Where can you learn more? Go to http://buffy-christiano.info/. Enter H333 in the search box to learn more about \"Bronchitis: Care Instructions. \" Current as of: May 12, 2017 Content Version: 11.4 © 7651-4550 [employer]. Care instructions adapted under license by [employer] (which disclaims liability or warranty for this information).  If you have questions about a medical condition or this instruction, always ask your healthcare professional. [place] [address] any warranty or liability for your use of this information. Introducing [facility]! Kevin Wilkinson introduces [employer] patient portal. Now you can access parts of your medical record, email your doctor's office, and request medication refills online. 1. In your internet browser, go to [email or web address] 2. Click on the First Time User? Click Here link in the Sign In box. You will see the New Member Sign Up page. 3. Enter your [employer] Access Code exactly as it appears below. You will not need to use this code after youve completed the sign-up process. If you do not sign up before the expiration date, you must request a new code. · [employer] Access Code: [email or web address] Expires: 4/26/2018 11:35 AM 
 
4. Enter the last four digits of your Social Security Number (xxxx) and Date of Birth (mm/dd/yyyy) as indicated and click Submit. You will be taken to the next sign-up page. 5. Create a [employer] ID. This will be your [employer] login ID and cannot be changed, so think of one that is secure and easy to remember. 6. Create a [employer] password. You can change your password at any time. 7. Enter your Password Reset Question and Answer. This can be used at a later time if you forget your password. 8. Enter your e-mail address. You will receive e-mail notification when new information is available in [address] 9. Click Sign Up. You can now view and download portions of your medical record. 10. Click the Download Summary menu link to download a portable copy of your medical information. If you have questions, please visit the Frequently Asked Questions section of the [employer] website. Remember, [employer] is NOT to be used for urgent needs. For medical emergencies, dial 911. Now available from your iPhone and Android! Please provide this summary of care documentation to your next provider. Your primary care clinician is listed as Gerri Singh. If you have any questions after today's visit, please call [phone].

## 2018-01-26 NOTE — PROGRESS NOTES
Assessment/Plan:    Diagnoses and all orders for this visit:    1. Type 2 diabetes mellitus without complication, without long-term current use of insulin (HCC)  -     AMB POC GLUCOSE BLOOD, BY GLUCOSE MONITORING DEVICE    2. Chronic bronchitis, unspecified chronic bronchitis type (HCC)  -     azithromycin (ZITHROMAX) 250 mg tablet; Si po today then 1 a day  -     predniSONE (DELTASONE) 20 mg tablet; Sig: 3 po for 3 days, then 2 po for 3 days then 1 po for 3 days    3. Bronchitis  -     azithromycin (ZITHROMAX) 250 mg tablet; Si po today then 1 a day  -     predniSONE (DELTASONE) 20 mg tablet; Sig: 3 po for 3 days, then 2 po for 3 days then 1 po for 3 days    New pt to me so I have reviewed his chart and available lab results  My nurse who knows him agrees that he does not appear to be in his usual state of health  Pt was argumentative with me (declines offer for pulmonary referral for worsening of his respiratory status stating Sarah Kincaid would I they said it is chronic\") and other examples but it may be due to the results of the recent flu combined with current acute exacerbation of chronic bronchitis. I would like for him to come in for a recheck in 2 weeks or sooner PRN. He can cancel the appt if he is feeling better      At f/u consider recheck CMP (creatinine 1.43 in ER 18)  CXR showed no acute changes in ER last week    Follow-up Disposition:  Return in about 2 weeks (around 2018). SANDRA Benz expressed understanding of this plan. An AVS was printed and given to the patient.      ----------------------------------------------------------------------    Chief Complaint   Patient presents with    Follow-up     Follow up visit       History of Present Illness:    77 yo DM, COPD, HTN here for ER f/up after being dx'd with flu on 18. The whole family was sick from the flu. He is recovering he states. His sxs were all respiratory and fever with body aches.  He did not have any GI sxs. He is eating well he states. He is taking in fluids. I noted that his mouth seemed very dry during the exam and he states that this is a chronic problem that dates back to the fall when he was started on a new medication (it is likely the lis/hctz). He states that the dry mouth is not new since the flu last week. The pt uses advair and Spiriva as prescribed (he is picking up advair from [facility] today). He has a nebulizer machine that he usually only uses at bedtime. When I question him why he does not use it more often he was quick to reply \"well it only helps me when I am using it and then 2 hours later I feel the same again\". We discussed that he could use it 4 times a day if needed. He is having worsening shortness of breath in the past few months. It does not seem that the flu has made much of a difference in the sxs. I ask about referral to pulmonology and he states \"why would I do that, they will just tell me that it is going to get worse\".      He is not having fevers for over 8 days or so now but has SOB and cough  I mentioned using sugar free candies to help with his dry mouth and he tells me that \"I don't shy away from sugar\", we did not discuss diabetes today in depth    He reports that he has peripheral edema by the end of the day each day, but in the morning it is better  His is urinating but thinks that his urine has changed over the past year or so (now it appears darker to him)    Past Medical History:   Diagnosis Date    Blurred vision     Cirrhosis, alcoholic ([address]     COPD bronchitis     Diabetes mellitus ([address]     Ear discomfort     History of ETOH abuse     HTN (hypertension)     SOB (shortness of breath)     Thrombocytopenia ([address]     Trouble in sleeping        Current Outpatient Prescriptions   Medication Sig Dispense Refill    azithromycin (ZITHROMAX) 250 mg tablet Si po today then 1 a day 6 Tab 0    predniSONE (DELTASONE) 20 mg tablet Sig: 3 po for 3 days, then 2 po for 3 days then 1 po for 3 days 18 Tab 0    cyclobenzaprine (FLEXERIL) 10 mg tablet TAKE ONE TABLET BY MOUTH THREE TIMES A DAY AS NEEDED FOR MUSCLE SPASMS 30 Tab 1    metoprolol tartrate (LOPRESSOR) 50 mg tablet TAKE ONE TABLET BY MOUTH TWICE A  Tab 1    VIAGRA 100 mg tablet Take 1 Tab by mouth as needed. 30 Tab 3    metFORMIN ER (GLUCOPHAGE XR) 500 mg tablet TAKE ONE TABLET BY MOUTH DAILY 90 Tab 1    lovastatin (MEVACOR) 20 mg tablet Take 1 Tab by mouth nightly. 90 Tab 1    levothyroxine (SYNTHROID) 88 mcg tablet TAKE ONE TABLET BY MOUTH EVERY MORNING BEFORE BREAKFAST 90 Tab 3    terazosin (HYTRIN) 1 mg capsule Take 1 Cap by mouth nightly. 30 Cap 3    losartan-hydroCHLOROthiazide (HYZAAR) 100-25 mg per tablet Take 1 Tab by mouth daily. 90 Tab 3    albuterol (PROVENTIL VENTOLIN) 2.5 mg /3 mL (0.083 %) nebulizer solution 3 mL by Nebulization route every four (4) hours as needed for Wheezing. 180 Each 5    ipratropium (ATROVENT) 0.02 % nebulizer solution 2.5 mL by Nebulization route four (4) times daily as needed for Wheezing (shortness of breath). 180 mL 5    fluticasone-salmeterol (ADVAIR DISKUS) 500-50 mcg/dose diskus inhaler Take 1 Puff by inhalation every twelve (12) hours.  tiotropium (SPIRIVA WITH HANDIHALER) 18 mcg inhalation capsule Take 1 Cap by inhalation daily. Allergies   Allergen Reactions    Bactrim [Sulfamethoprim Ds] Diarrhea and Nausea and Vomiting     After on 1st dose.     Morphine Unknown (comments)       Social History   Substance Use Topics    Smoking status: Former Smoker     Types: Cigarettes     Quit date: 2014    Smokeless tobacco: Former User    Alcohol use No      Comment: Quit Oct 21, 1985       Family History   Problem Relation Age of Onset    Diabetes Neg Hx     Hypertension Father     Heart Disease Father      pacemaker,  of MI @ 80    Hypertension Mother     Cancer Mother      breast with spread to bones       Physical Exam:     Visit Vitals    /63 (BP 1 Location: Left arm, BP Patient Position: Sitting)    Pulse 74    Temp 97.7 °F (36.5 °C) (Oral)    Wt 242 lb (109.8 kg)    SpO2 96%    BMI 35.74 kg/m2   juanjose complexion, dry mouth    A&Ox3  WDWN NAD  Respirations normal and non labored  Lungs- guevara lungs show rales right> left   Pulse ox 96% RA  HR 70's   Lab Results   Component Value Date/Time    Sodium 137 01/18/2018 12:14 PM    Potassium 3.5 01/18/2018 12:14 PM    Chloride 104 01/18/2018 12:14 PM    CO2 25 01/18/2018 12:14 PM    Anion gap 8 01/18/2018 12:14 PM    Glucose 104 01/18/2018 12:14 PM    BUN 18 01/18/2018 12:14 PM    Creatinine 1.43 01/18/2018 12:14 PM    BUN/Creatinine ratio 13 01/18/2018 12:14 PM    GFR est AA 60 01/18/2018 12:14 PM    GFR est non-AA 49 01/18/2018 12:14 PM    Calcium 8.7 01/18/2018 12:14 PM    Bilirubin, total 2.1 01/18/2018 12:14 PM    AST (SGOT) 64 01/18/2018 12:14 PM    Alk.  phosphatase 68 01/18/2018 12:14 PM    Protein, total 7.0 01/18/2018 12:14 PM    Albumin 2.7 01/18/2018 12:14 PM    Globulin 4.3 01/18/2018 12:14 PM    A-G Ratio 0.6 01/18/2018 12:14 PM    ALT (SGPT) 41 01/18/2018 12:14 PM

## 2018-02-01 ENCOUNTER — HOSPITAL ENCOUNTER (OUTPATIENT)
Dept: LAB | Age: 68
Discharge: HOME | End: 2018-02-01

## 2018-02-01 ENCOUNTER — OFFICE VISIT (OUTPATIENT)
Dept: FAMILY MEDICINE CLINIC | Age: 68
End: 2018-02-01

## 2018-02-01 VITALS
OXYGEN SATURATION: 93 % | WEIGHT: 243 LBS | SYSTOLIC BLOOD PRESSURE: 110 MMHG | HEIGHT: 69 IN | DIASTOLIC BLOOD PRESSURE: 58 MMHG | TEMPERATURE: 98.3 F | BODY MASS INDEX: 35.99 KG/M2 | HEART RATE: 85 BPM

## 2018-02-01 DIAGNOSIS — J44.9 CHRONIC OBSTRUCTIVE PULMONARY DISEASE, UNSPECIFIED COPD TYPE (HCC): ICD-10-CM

## 2018-02-01 DIAGNOSIS — R79.89 ELEVATED SERUM CREATININE: ICD-10-CM

## 2018-02-01 DIAGNOSIS — J02.9 SORE THROAT: ICD-10-CM

## 2018-02-01 DIAGNOSIS — K74.69 OTHER CIRRHOSIS OF LIVER (HCC): ICD-10-CM

## 2018-02-01 DIAGNOSIS — J44.1 CHRONIC OBSTRUCTIVE PULMONARY DISEASE WITH ACUTE EXACERBATION (HCC): Primary | ICD-10-CM

## 2018-02-01 DIAGNOSIS — E11.9 TYPE 2 DIABETES MELLITUS WITHOUT COMPLICATION, WITHOUT LONG-TERM CURRENT USE OF INSULIN (HCC): ICD-10-CM

## 2018-02-01 LAB
ALBUMIN SERPL-MCNC: 2.8 G/DL (ref 3.5–5)
ALBUMIN/GLOB SERPL: 0.7 {RATIO} (ref 1.1–2.2)
ALP SERPL-CCNC: 105 U/L (ref 45–117)
ALT SERPL-CCNC: 56 U/L (ref 12–78)
ANION GAP SERPL CALC-SCNC: 11 MMOL/L (ref 5–15)
AST SERPL-CCNC: 31 U/L (ref 15–37)
BASOPHILS # BLD: 0 K/UL (ref 0–0.1)
BASOPHILS NFR BLD: 0 % (ref 0–1)
BILIRUB SERPL-MCNC: 2.8 MG/DL (ref 0.2–1)
BUN SERPL-MCNC: 21 MG/DL (ref 6–20)
BUN/CREAT SERPL: 17 (ref 12–20)
CALCIUM SERPL-MCNC: 8.7 MG/DL (ref 8.5–10.1)
CHLORIDE SERPL-SCNC: 105 MMOL/L (ref 97–108)
CO2 SERPL-SCNC: 25 MMOL/L (ref 21–32)
CREAT SERPL-MCNC: 1.26 MG/DL (ref 0.7–1.3)
DIFFERENTIAL METHOD BLD: ABNORMAL
EOSINOPHIL # BLD: 0 K/UL (ref 0–0.4)
EOSINOPHIL NFR BLD: 0 % (ref 0–7)
ERYTHROCYTE [DISTWIDTH] IN BLOOD BY AUTOMATED COUNT: 14.3 % (ref 11.5–14.5)
GLOBULIN SER CALC-MCNC: 3.9 G/DL (ref 2–4)
GLUCOSE POC: NORMAL MG/DL
GLUCOSE SERPL-MCNC: 159 MG/DL (ref 65–100)
HCT VFR BLD AUTO: 41 % (ref 36.6–50.3)
HGB BLD-MCNC: 14.4 G/DL (ref 12.1–17)
IMM GRANULOCYTES # BLD: 0.2 K/UL (ref 0–0.04)
IMM GRANULOCYTES NFR BLD AUTO: 1 % (ref 0–0.5)
LYMPHOCYTES # BLD: 0.7 K/UL (ref 0.8–3.5)
LYMPHOCYTES NFR BLD: 4 % (ref 12–49)
MCH RBC QN AUTO: 35.4 PG (ref 26–34)
MCHC RBC AUTO-ENTMCNC: 35.1 G/DL (ref 30–36.5)
MCV RBC AUTO: 100.7 FL (ref 80–99)
MONOCYTES # BLD: 1.1 K/UL (ref 0–1)
MONOCYTES NFR BLD: 7 % (ref 5–13)
NEUTS SEG # BLD: 14.3 K/UL (ref 1.8–8)
NEUTS SEG NFR BLD: 88 % (ref 32–75)
NRBC # BLD: 0 K/UL (ref 0–0.01)
NRBC BLD-RTO: 0 PER 100 WBC
PLATELET # BLD AUTO: 99 K/UL (ref 150–400)
PMV BLD AUTO: 10.6 FL (ref 8.9–12.9)
POTASSIUM SERPL-SCNC: 3.4 MMOL/L (ref 3.5–5.1)
PROT SERPL-MCNC: 6.7 G/DL (ref 6.4–8.2)
RBC # BLD AUTO: 4.07 M/UL (ref 4.1–5.7)
RBC MORPH BLD: ABNORMAL
RBC MORPH BLD: ABNORMAL
S PYO AG THROAT QL: NEGATIVE
SODIUM SERPL-SCNC: 141 MMOL/L (ref 136–145)
VALID INTERNAL CONTROL?: YES
WBC # BLD AUTO: 16.3 K/UL (ref 4.1–11.1)

## 2018-02-01 PROCEDURE — 80053 COMPREHEN METABOLIC PANEL: CPT | Performed by: FAMILY MEDICINE

## 2018-02-01 PROCEDURE — 85025 COMPLETE CBC W/AUTO DIFF WBC: CPT | Performed by: FAMILY MEDICINE

## 2018-02-01 RX ORDER — LEVOFLOXACIN 750 MG/1
750 TABLET ORAL
Qty: 5 TAB | Refills: 0 | Status: SHIPPED | OUTPATIENT
Start: 2018-02-01 | End: 2018-02-15 | Stop reason: ALTCHOICE

## 2018-02-01 RX ORDER — IPRATROPIUM BROMIDE 0.5 MG/2.5ML
0.5 SOLUTION RESPIRATORY (INHALATION)
Qty: 180 ML | Refills: 5 | Status: SHIPPED | OUTPATIENT
Start: 2018-02-01 | End: 2019-02-12 | Stop reason: SDUPTHER

## 2018-02-01 RX ORDER — ALBUTEROL SULFATE 0.83 MG/ML
2.5 SOLUTION RESPIRATORY (INHALATION)
Qty: 180 EACH | Refills: 5 | Status: SHIPPED | OUTPATIENT
Start: 2018-02-01 | End: 2019-02-25 | Stop reason: SDUPTHER

## 2018-02-01 RX ORDER — NYSTATIN 100000 [USP'U]/ML
5 SUSPENSION ORAL 4 TIMES DAILY
Qty: 473 ML | Refills: 0 | Status: SHIPPED | OUTPATIENT
Start: 2018-02-01 | End: 2018-05-08 | Stop reason: ALTCHOICE

## 2018-02-01 RX ORDER — ALBUTEROL SULFATE 0.83 MG/ML
2.5 SOLUTION RESPIRATORY (INHALATION) ONCE
Qty: 1 EACH | Refills: 0
Start: 2018-02-01 | End: 2018-02-01

## 2018-02-01 NOTE — PROGRESS NOTES
Albuterol neb tx x1 administered to patient per verbal order Dr Garcia. O2 sats before tx were 93%. Pt tolerated well, O2 sats at 92 - 93% after tx.

## 2018-02-01 NOTE — PROGRESS NOTES
Coordination of Care  1. Have you been to the ER, urgent care clinic since your last visit? Hospitalized since your last visit? No    2. Have you seen or consulted any other health care providers outside of the [address] [place] [address] since your last visit? Include any pap smears or colon screening. No    Medications  Does the patient need refills?  YES    Learning Assessment Complete? yes  Results for orders placed or performed in visit on 02/01/18   AMB POC GLUCOSE BLOOD, BY GLUCOSE MONITORING DEVICE   Result Value Ref Range    Glucose POC nf 169 mg/dL   AMB POC RAPID STREP A   Result Value Ref Range    VALID INTERNAL CONTROL POC Yes     Group A Strep Ag Negative Negative

## 2018-02-01 NOTE — MR AVS SNAPSHOT
[address] [facility] [address] 
 
 
 [address] [facility] [address] [address] 
[phone] Patient: Freda Manjarrez MRN: [id number] Visit Information Date & Time Provider Department Dept. Phone Encounter #  
 2/1/2018 12:55 PM Tolu Wheeler Surindermeir [phone] [id number] Follow-up Instructions Return in about 2 weeks (around 2/15/2018). Your Appointments 2/7/2018 10:50 AM  
ROUTINE CARE with Bonita Barrett MD  
[facility] [address]) Appt Note: fu   2 weeks  chest cold 1 [address]  
  
   
 [address] [facility] Upcoming Health Maintenance Date Due COLONOSCOPY 2/19/1969 DTaP/Tdap/Td series (1 - Tdap) 2/19/1972 ZOSTER VACCINE AGE 60> 12/19/2010 EYE EXAM RETINAL OR DILATED Q1 7/1/2014 GLAUCOMA SCREENING Q2Y 2/19/2016 FOOT EXAM Q1 10/13/2016 MEDICARE YEARLY EXAM 8/11/2017 HEMOGLOBIN A1C Q6M 12/21/2017 MICROALBUMIN Q1 6/21/2018 LIPID PANEL Q1 10/25/2018 Allergies as of 2/1/2018  Review Complete On: 2/1/2018 By: James Johnson Severity Noted Reaction Type Reactions Bactrim [Sulfamethoprim Ds]  12/03/2015    Diarrhea, Nausea and Vomiting After on 1st dose. Morphine  08/15/2011    Unknown (comments) Current Immunizations  Reviewed on 10/25/2017 Name Date Influenza Vaccine 10/22/2014, 1/24/2014, 10/1/2012 Influenza Vaccine (Quad) PF 10/25/2017, 12/28/2016, 10/22/2015 Pneumococcal Polysaccharide (PPSV-23) 12/28/2016 Pneumococcal Vaccine (Unspecified Type) 1/1/2010 Not reviewed this visit You Were Diagnosed With   
  
 Codes Comments Chronic obstructive pulmonary disease with acute exacerbation (HCC)    -  Primary ICD-10-CM: J44.1 ICD-9-CM: 491.21  Type 2 diabetes mellitus without complication, without long-term current use of insulin ([facility]     ICD-10-CM: E11.9 ICD-9-CM: 250.00 Sore throat     ICD-10-CM: J02.9 ICD-9-CM: 082 Other cirrhosis of liver ([facility]     ICD-10-CM: C61.18 ICD-9-CM: 571.5 Elevated serum creatinine     ICD-10-CM: R79.89 ICD-9-CM: 790.99 Vitals BP Pulse Temp Height(growth percentile) Weight(growth percentile) SpO2  
 110/58 (BP 1 Location: Left arm) 85 98.3 °F (36.8 °C) (Oral) 5' 9.02\" (1.753 m) 243 lb (110.2 kg) 93% BMI Smoking Status 35.87 kg/m2 Former Smoker Vitals History BMI and BSA Data Body Mass Index Body Surface Area  
 35.87 kg/m 2 2.32 m 2 Preferred Pharmacy Pharmacy Name Phone [place] [phone] Your Updated Medication List  
  
   
This list is accurate as of: 2/1/18  2:41 PM.  Always use your most recent med list.  
  
  
  
  
 ADVAIR DISKUS 500-50 mcg/dose diskus inhaler Generic drug:  fluticasone-salmeterol Take 1 Puff by inhalation every twelve (12) hours. * albuterol 2.5 mg /3 mL (0.083 %) nebulizer solution Commonly known as:  PROVENTIL VENTOLIN  
3 mL by Nebulization route every four (4) hours as needed for Wheezing. * albuterol 2.5 mg /3 mL (0.083 %) nebulizer solution Commonly known as:  PROVENTIL VENTOLIN  
3 mL by Nebulization route once for 1 dose. cyclobenzaprine 10 mg tablet Commonly known as:  FLEXERIL  
TAKE ONE TABLET BY MOUTH THREE TIMES A DAY AS NEEDED FOR MUSCLE SPASMS  
  
 ipratropium 0.02 % Soln Commonly known as:  ATROVENT  
2.5 mL by Nebulization route four (4) times daily as needed. levoFLOXacin 750 mg tablet Commonly known as:  Sakina Asters Take 1 Tab by mouth every fourty-eight (48) hours. levothyroxine 88 mcg tablet Commonly known as:  SYNTHROID  
TAKE ONE TABLET BY MOUTH EVERY MORNING BEFORE BREAKFAST  
  
 losartan-hydroCHLOROthiazide 100-25 mg per tablet Commonly known as:  HYZAAR  
 Take 1 Tab by mouth daily. lovastatin 20 mg tablet Commonly known as:  MEVACOR Take 1 Tab by mouth nightly. metFORMIN  mg tablet Commonly known as:  GLUCOPHAGE XR  
TAKE ONE TABLET BY MOUTH DAILY  
  
 metoprolol tartrate 50 mg tablet Commonly known as:  LOPRESSOR  
TAKE ONE TABLET BY MOUTH TWICE A DAY  
  
 nystatin 100,000 unit/mL suspension Commonly known as:  MYCOSTATIN Take 5 mL by mouth four (4) times daily. swish and swallow  
  
 predniSONE 20 mg tablet Commonly known as:  Marge Better Sig: 3 po for 3 days, then 2 po for 3 days then 1 po for 3 days SPIRIVA WITH HANDIHALER 18 mcg inhalation capsule Generic drug:  tiotropium Take 1 Cap by inhalation daily. terazosin 1 mg capsule Commonly known as:  HYTRIN Take 1 Cap by mouth nightly. VIAGRA 100 mg tablet Generic drug:  sildenafil citrate Take 1 Tab by mouth as needed. * Notice: This list has 2 medication(s) that are the same as other medications prescribed for you. Read the directions carefully, and ask your doctor or other care provider to review them with you. Prescriptions Sent to Pharmacy Refills  
 levoFLOXacin (LEVAQUIN) 750 mg tablet 0 Sig: Take 1 Tab by mouth every fourty-eight (48) hours. Class: Normal  
 Pharmacy: [address] Ph #: [phone] Route: Oral  
 nystatin (MYCOSTATIN) 100,000 unit/mL suspension 0 Sig: Take 5 mL by mouth four (4) times daily. swish and swallow Class: Normal  
 Pharmacy: [address] Ph #: [phone] Route: Oral  
  
We Performed the Following ALBUTEROL, INHAL. SOL., FDA-APPROVED FINAL, NON-COMPOUND UNIT DOSE, 1 MG [ [facility]] AMB POC GLUCOSE BLOOD, BY GLUCOSE MONITORING DEVICE [46135 CPT(R)] AMB POC RAPID STREP A [90786 CPT(R)] Follow-up Instructions Return in about 2 weeks (around 2/15/2018). To-Do List   
 02/01/2018 Lab:  CBC WITH AUTOMATED DIFF   
  
 02/01/2018 Lab:  METABOLIC PANEL, COMPREHENSIVE   
  
 02/01/2018 Lab:  PROTHROMBIN TIME + INR Patient Instructions STOP taking azithromycin Take new antibiotic levofloxacin 750mg today - I will call tomorrow to let you know lab results and if you should continue this as every other day or every day (based on renal function) Use albuterol inhaler every 4 hours for the next 24-48 hours. Then continue to use every 4 hours as needed for cough, wheezing, shortness of breath. Use nystatin swish and swallow 4 times daily for throat pain, possible esophageal thrush (fungal infection of the lower throat) You need to go to the ER urgently via ambulance if you develop severe shortness of breath, chest pain, persistent or recurrent bleeding from the mouth, rectum or fever, chills, dizziness, vomiting Chronic Obstructive Pulmonary Disease (COPD): Care Instructions Your Care Instructions Chronic obstructive pulmonary disease (COPD) is a general term for a group of lung diseases, including emphysema and chronic bronchitis. People with COPD have decreased airflow in and out of the lungs, which makes it hard to breathe. The airways also can get clogged with thick mucus. Cigarette smoking is a major cause of COPD. Although there is no cure for COPD, you can slow its progress. Following your treatment plan and taking care of yourself can help you feel better and live longer. Follow-up care is a key part of your treatment and safety. Be sure to make and go to all appointments, and call your doctor if you are having problems. It's also a good idea to know your test results and keep a list of the medicines you take. How can you care for yourself at home? ?Staying healthy ? · Do not smoke. This is the most important step you can take to prevent more damage to your lungs.  If you need help quitting, talk to your doctor about stop-smoking programs and medicines. These can increase your chances of quitting for good. ? · Avoid colds and flu. Get a pneumococcal vaccine shot. If you have had one before, ask your doctor whether you need a second dose. Get the flu vaccine every fall. If you must be around people with colds or the flu, wash your hands often. ? · Avoid secondhand smoke, air pollution, and high altitudes. Also avoid cold, dry air and hot, humid air. Stay at home with your windows closed when air pollution is bad. ?Medicines and oxygen therapy ? · Take your medicines exactly as prescribed. Call your doctor if you think you are having a problem with your medicine. ? · You may be taking medicines such as: ¨ Bronchodilators. These help open your airways and make breathing easier. Bronchodilators are either short-acting (work for 6 to 9 hours) or long-acting (work for 24 hours). You inhale most bronchodilators, so they start to act quickly. Always carry your quick-relief inhaler with you in case you need it while you are away from home. ¨ Corticosteroids (prednisone, budesonide). These reduce airway inflammation. They come in pill or inhaled form. You must take these medicines every day for them to work well. ? · A spacer may help you get more inhaled medicine to your lungs. Ask your doctor or pharmacist if a spacer is right for you. If it is, ask how to use it properly. ? · Do not take any vitamins, over-the-counter medicine, or herbal products without talking to your doctor first.  
? · If your doctor prescribed antibiotics, take them as directed. Do not stop taking them just because you feel better. You need to take the full course of antibiotics. ? · Oxygen therapy boosts the amount of oxygen in your blood and helps you breathe easier. Use the flow rate your doctor has recommended, and do not change it without talking to your doctor first.  
Activity ? · Get regular exercise. Walking is an easy way to get exercise. Start out slowly, and walk a little more each day. ? · Pay attention to your breathing. You are exercising too hard if you cannot talk while you are exercising. ? · Take short rest breaks when doing household chores and other activities. ? · Learn breathing methods-such as breathing through pursed lips-to help you become less short of breath. ? · If your doctor has not set you up with a pulmonary rehabilitation program, talk to him or her about whether rehab is right for you. Rehab includes exercise programs, education about your disease and how to manage it, help with diet and other changes, and emotional support. Diet ? · Eat regular, healthy meals. Use bronchodilators about 1 hour before you eat to make it easier to eat. Eat several small meals instead of three large ones. Drink beverages at the end of the meal. Avoid foods that are hard to chew. ? · Eat foods that contain protein so that you do not lose muscle mass. ? · Talk with your doctor if you gain too much weight or if you lose weight without trying. ?Mental health ? · Talk to your family, friends, or a therapist about your feelings. It is normal to feel frightened, angry, hopeless, helpless, and even guilty. Talking openly about bad feelings can help you cope. If these feelings last, talk to your doctor. When should you call for help? Call 911 anytime you think you may need emergency care. For example, call if: 
? · You have severe trouble breathing. ?Call your doctor now or seek immediate medical care if: 
? · You have new or worse trouble breathing. ? · You cough up blood. ? · You have a fever. ? Watch closely for changes in your health, and be sure to contact your doctor if: 
? · You cough more deeply or more often, especially if you notice more mucus or a change in the color of your mucus. ? · You have new or worse swelling in your legs or belly. ? · You are not getting better as expected. Where can you learn more? Go to http://buffy-christiano.info/. Pedro Pablo Haley in the search box to learn more about \"Chronic Obstructive Pulmonary Disease (COPD): Care Instructions. \" Current as of: May 12, 2017 Content Version: 11.4 © 6906-2986 [employer]. Care instructions adapted under license by [employer] (which disclaims liability or warranty for this information). If you have questions about a medical condition or this instruction, always ask your healthcare professional. [address] any warranty or liability for your use of this information. Cirrhosis: Care Instructions Your Care Instructions Cirrhosis occurs when healthy tissue in your liver gets scarred. This keeps the liver from working well. It usually happens after a liver has been inflamed for years. Cirrhosis is most often caused by alcohol abuse or hepatitis infection. But there are other causes too. These include medicines and too much fat in the liver. Conditions passed down in families and other disorders can also cause it. In some cases, no cause can be found. Treatment can't completely fix liver damage. But you may be able to slow or prevent more damage if you don't drink alcohol or use drugs that harm your liver. Follow-up care is a key part of your treatment and safety. Be sure to make and go to all appointments, and call your doctor if you are having problems. It's also a good idea to know your test results and keep a list of the medicines you take. How can you care for yourself at home? · Do not drink any alcohol. It can harm your liver. Talk to your doctor if you need help to stop drinking. · Be safe with medicines. Take your medicines exactly as prescribed. Call your doctor if you think you are having a problem with your medicine. · Talk to your doctor before you take any other medicines.  These include over-the-counter medicines and herbal products. · Be careful taking acetaminophen (Tylenol), ibuprofen (Advil, Motrin), or naproxen (Aleve). These can sometimes cause more liver damage. Talk with your doctor if you're not sure which medicines are safe. · If your cirrhosis causes extra fluid to build up in your body, try not to eat a lot of salt. Use less salt when you cook and at the table. Don't eat fast foods or snack foods with a lot of salt. Extra fluid in your belly, legs, and chest can cause serious problems. · Work with your doctor or a dietitian to be sure you eat the right amount of carbohydrate, protein, fat, and sodium (salt). It's very important to choose the best foods for the health of your liver. · If your doctor recommends it, limit how much fluid you drink. · If your doctor recommends it, get more exercise. Walking is a good choice. Bit by bit, increase the amount you walk every day. Try for at least 30 minutes on most days of the week. You also may want to swim, bike, or do other activities. When should you call for help? Call 911 anytime you think you may need emergency care. For example, call if: 
? · You have trouble breathing. ? · You vomit blood or what looks like coffee grounds. ?Call your doctor now or seek immediate medical care if: 
? · You feel very sleepy or confused. ? · You have new belly pain, or your pain gets worse. ? · You have a fever. ? · There is a new or increasing yellow tint to your skin or the whites of your eyes. ? · You have any abnormal bleeding, such as: 
¨ Nosebleeds. ¨ Vaginal bleeding that is different (heavier, more frequent, at a different time of the month) than what you are used to. ¨ Bloody or black stools, or rectal bleeding. ¨ Bloody or pink urine. ? Watch closely for changes in your health, and be sure to contact your doctor if: 
? · You have any problems. ? · Your belly is getting bigger. ? · You are gaining weight. Where can you learn more? Go to http://buffy-christiano.info/. Enter M412 in the search box to learn more about \"Cirrhosis: Care Instructions. \" Current as of: May 12, 2017 Content Version: 11.4 © 4098-4365 [employer]. Care instructions adapted under license by [employer] (which disclaims liability or warranty for this information). If you have questions about a medical condition or this instruction, always ask your healthcare professional. [address] any warranty or liability for your use of this information. Introducing [facility]! Marcell Nuñez introduces Cumed patient portal. Now you can access parts of your medical record, email your doctor's office, and request medication refills online. 1. In your internet browser, go to [email or web address] 2. Click on the First Time User? Click Here link in the Sign In box. You will see the New Member Sign Up page. 3. Enter your Cumed Access Code exactly as it appears below. You will not need to use this code after youve completed the sign-up process. If you do not sign up before the expiration date, you must request a new code. · Cumed Access Code: [email or web address] Expires: 4/26/2018 11:35 AM 
 
4. Enter the last four digits of your Social Security Number (xxxx) and Date of Birth (mm/dd/yyyy) as indicated and click Submit. You will be taken to the next sign-up page. 5. Create a [employer] ID. This will be your Cumed login ID and cannot be changed, so think of one that is secure and easy to remember. 6. Create a Cumed password. You can change your password at any time. 7. Enter your Password Reset Question and Answer. This can be used at a later time if you forget your password. 8. Enter your e-mail address. You will receive e-mail notification when new information is available in [address] 9. Click Sign Up. You can now view and download portions of your medical record. 10. Click the Download Summary menu link to download a portable copy of your medical information. If you have questions, please visit the Frequently Asked Questions section of the [employer] website. Remember, [employer] is NOT to be used for urgent needs. For medical emergencies, dial 911. Now available from your iPhone and Android! Please provide this summary of care documentation to your next provider. Your primary care clinician is listed as Socorro Merchant. If you have any questions after today's visit, please call [phone].

## 2018-02-01 NOTE — PROGRESS NOTES
Chata Zabala is a 78 y.o. male    Issues discussed today include:    Chief Complaint   Patient presents with    COPD     pt c/o bad cough w/ some blood from throat and nose    Ringing in Ear     pt c/o buzzing in both ears       1) Cough:  Has COPD, takes Advair, Spiriva and prn albuterol. Says cough started 4 days ago. In chart, was seen in ED on 1/18 for cough and diagnosed with flu, then here on 1/26 for COPD exacerbation. Describes more difficulty breathing today. Ran out of breathing tx's and these were sent in this am.  Also with increased sputum and cough. Is finishing steroid taper, has 2 more days of prednisone 10mg. Went to blow nose last night, tissue filled up with blood. Stopped by the time he finished taking his medications. Throat sore for days. 2yo granddaughter was diagnosed with strep throat recently. Takes spiriva every am upon waking up. Then he eats breakfast and takes advair and other medications. Felt winded this am, then felt better after the above routine. Of note:  He complains he is taking too much medication and believes the medicine is causing his arm bruising. I confirmed he is not taking any blood thinners, not even aspirin. In reviewing record, appears pt had a dx of cirrhosis by [address] in 2013. He denies knowledge of this.       Data reviewed or ordered today:       Other problems include:  Patient Active Problem List   Diagnosis Code    Obstructive sleep apnea (adult) (pediatric) G47.33    COPD (chronic obstructive pulmonary disease) ([facility] J44.9    Cirrhosis ([facility] K74.60    History of ETOH abuse Z87.898    Thrombocytopenia ([facility] D69.6    Lower urinary tract symptoms (LUTS) R39.9    Hypothyroidism due to acquired atrophy of thyroid E03.4    Essential hypertension I10    Type 2 diabetes mellitus without complication, without long-term current use of insulin (HCC) E11.9    Chronic left-sided thoracic back pain M54.6, G89.29    Type 2 diabetes mellitus with nephropathy (HCC) E11.21       Medications:  Current Outpatient Prescriptions   Medication Sig Dispense Refill    levoFLOXacin (LEVAQUIN) 750 mg tablet Take 1 Tab by mouth every fourty-eight (48) hours. 5 Tab 0    nystatin (MYCOSTATIN) 100,000 unit/mL suspension Take 5 mL by mouth four (4) times daily. swish and swallow 473 mL 0    albuterol (PROVENTIL VENTOLIN) 2.5 mg /3 mL (0.083 %) nebulizer solution 3 mL by Nebulization route every four (4) hours as needed for Wheezing. 180 Each 5    ipratropium (ATROVENT) 0.02 % soln 2.5 mL by Nebulization route four (4) times daily as needed. 180 mL 5    predniSONE (DELTASONE) 20 mg tablet Sig: 3 po for 3 days, then 2 po for 3 days then 1 po for 3 days 18 Tab 0    cyclobenzaprine (FLEXERIL) 10 mg tablet TAKE ONE TABLET BY MOUTH THREE TIMES A DAY AS NEEDED FOR MUSCLE SPASMS 30 Tab 1    metoprolol tartrate (LOPRESSOR) 50 mg tablet TAKE ONE TABLET BY MOUTH TWICE A  Tab 1    VIAGRA 100 mg tablet Take 1 Tab by mouth as needed. 30 Tab 3    metFORMIN ER (GLUCOPHAGE XR) 500 mg tablet TAKE ONE TABLET BY MOUTH DAILY 90 Tab 1    lovastatin (MEVACOR) 20 mg tablet Take 1 Tab by mouth nightly. 90 Tab 1    levothyroxine (SYNTHROID) 88 mcg tablet TAKE ONE TABLET BY MOUTH EVERY MORNING BEFORE BREAKFAST 90 Tab 3    terazosin (HYTRIN) 1 mg capsule Take 1 Cap by mouth nightly. 30 Cap 3    losartan-hydroCHLOROthiazide (HYZAAR) 100-25 mg per tablet Take 1 Tab by mouth daily. 90 Tab 3    fluticasone-salmeterol (ADVAIR DISKUS) 500-50 mcg/dose diskus inhaler Take 1 Puff by inhalation every twelve (12) hours.  tiotropium (SPIRIVA WITH HANDIHALER) 18 mcg inhalation capsule Take 1 Cap by inhalation daily. Allergies: Allergies   Allergen Reactions    Bactrim [Sulfamethoprim Ds] Diarrhea and Nausea and Vomiting     After on 1st dose.  Morphine Unknown (comments)       LMP:  No LMP for male patient.     Social History     Social History    Marital status:  Spouse name: N/A    Number of children: N/A    Years of education: N/A     Occupational History    Not on file. Social History Main Topics    Smoking status: Former Smoker     Types: Cigarettes     Quit date: 2014    Smokeless tobacco: Former User    Alcohol use No      Comment: Quit Oct 21, 1985    Drug use: No    Sexual activity: Not on file     Other Topics Concern    Not on file     Social History Narrative       Family History   Problem Relation Age of Onset    Diabetes Neg Hx     Hypertension Father     Heart Disease Father      pacemaker,  of MI @ 80    Hypertension Mother     Cancer Mother      breast with spread to bones         Physical Exam   Visit Vitals    /58 (BP 1 Location: Left arm)    Pulse 85    Temp 98.3 °F (36.8 °C) (Oral)    Ht 5' 9.02\" (1.753 m)    Wt 243 lb (110.2 kg)    SpO2 93%    BMI 35.87 kg/m2      BP Readings from Last 3 Encounters:   18 110/58   18 125/63   18 129/62     Constitutional: Appears well,  No acute distress, Vitals noted  Psychiatric:  Affect normal, Alert and Oriented to person/place/time  Eyes:  Conjunctiva clear, no drainage  ENT:  External ears and nose normal, Mucous membranes moist  Neck:  General inspection normal. Supple. Heart:  Normal HR, Normal S1 and S2,  Regular rhythm. No murmurs, rubs or gallops. Lungs:  Clear to auscultation, good respiratory effort, mod air movement, diffuse, loud inspiratory and expiratory coarse wheezes, no rales or rhonchi.  After albuterol 2.5mg neb tx in office, pt had improved air movement, only expiratory wheezes  Extremities: Without edema, good peripheral pulses  Skin:  Warm to palpation, ecchymosis extensive to bilateral forearms    Lab Results   Component Value Date/Time    Glucose 159 2018 02:54 PM    Glucose (POC) 125 2010 11:25 AM    Glucose POC nf 169 2018 01:15 PM       POC Rapid Strep A  Test: Negative    Assessment/Plan:      ICD-10-CM ICD-9-CM    1. Chronic obstructive pulmonary disease with acute exacerbation (HCC) J44.1 491.21 albuterol (PROVENTIL VENTOLIN) 2.5 mg /3 mL (0.083 %) nebulizer solution      ALBUTEROL, INHAL. SOL., FDA-APPROVED FINAL, NON-COMPOUND UNIT DOSE, 1 MG      levoFLOXacin (LEVAQUIN) 750 mg tablet   2. Type 2 diabetes mellitus without complication, without long-term current use of insulin (HCC) E11.9 250.00 AMB POC GLUCOSE BLOOD, BY GLUCOSE MONITORING DEVICE   3. Sore throat J02.9 462 AMB POC RAPID STREP A      nystatin (MYCOSTATIN) 100,000 unit/mL suspension   4. Other cirrhosis of liver (HCC) J16.87 760.5 METABOLIC PANEL, COMPREHENSIVE      CBC WITH AUTOMATED DIFF      PROTHROMBIN TIME + INR   5. Elevated serum creatinine L99.71 206.98 METABOLIC PANEL, COMPREHENSIVE     Suspect lingering copd exacerbation, hard to tell if patient has been using albuterol prn or just scheduled meds  He does has neb machine at home and now has albuterol and ipratroprium rx's  Given age > 72, recent abx use will tx with levofloxacin (pt warned of potential side effect of tendon pain, rupture and told to stop if having joint/ankle pain)  I gave him the below instructions   As for his cirrhosis, I am checking some labs today and have asked pt to RTC in 2 weeks to discuss further. ED precautions given - go to ER if coughing or vomiting blood, having dark stools, severe difficulty breathing, chest pain, fever, chills      Patient Instructions Below:    STOP taking azithromycin     Take new antibiotic levofloxacin 750mg today - I will call tomorrow to let you know lab results and if you should continue this as every other day or every day (based on renal function)    Use albuterol inhaler every 4 hours for the next 24-48 hours. Then continue to use every 4 hours as needed for cough, wheezing, shortness of breath.     Use nystatin swish and swallow 4 times daily for throat pain, possible esophageal thrush (fungal infection of the lower throat)      Follow-up Disposition:  Return in about 2 weeks (around 2/15/2018).         William Orr MD  [address] [place] [address]

## 2018-02-01 NOTE — PATIENT INSTRUCTIONS
STOP taking azithromycin     Take new antibiotic levofloxacin 750mg today - I will call tomorrow to let you know lab results and if you should continue this as every other day or every day (based on renal function)    Use albuterol inhaler every 4 hours for the next 24-48 hours. Then continue to use every 4 hours as needed for cough, wheezing, shortness of breath. Use nystatin swish and swallow 4 times daily for throat pain, possible esophageal thrush (fungal infection of the lower throat)    You need to go to the ER urgently via ambulance if you develop severe shortness of breath, chest pain, persistent or recurrent bleeding from the mouth, rectum or fever, chills, dizziness, vomiting     Chronic Obstructive Pulmonary Disease (COPD): Care Instructions  Your Care Instructions    Chronic obstructive pulmonary disease (COPD) is a general term for a group of lung diseases, including emphysema and chronic bronchitis. People with COPD have decreased airflow in and out of the lungs, which makes it hard to breathe. The airways also can get clogged with thick mucus. Cigarette smoking is a major cause of COPD. Although there is no cure for COPD, you can slow its progress. Following your treatment plan and taking care of yourself can help you feel better and live longer. Follow-up care is a key part of your treatment and safety. Be sure to make and go to all appointments, and call your doctor if you are having problems. It's also a good idea to know your test results and keep a list of the medicines you take. How can you care for yourself at home? ?Staying healthy  ? · Do not smoke. This is the most important step you can take to prevent more damage to your lungs. If you need help quitting, talk to your doctor about stop-smoking programs and medicines. These can increase your chances of quitting for good. ? · Avoid colds and flu. Get a pneumococcal vaccine shot.  If you have had one before, ask your doctor whether you need a second dose. Get the flu vaccine every fall. If you must be around people with colds or the flu, wash your hands often. ? · Avoid secondhand smoke, air pollution, and high altitudes. Also avoid cold, dry air and hot, humid air. Stay at home with your windows closed when air pollution is bad. ?Medicines and oxygen therapy  ? · Take your medicines exactly as prescribed. Call your doctor if you think you are having a problem with your medicine. ? · You may be taking medicines such as:  ¨ Bronchodilators. These help open your airways and make breathing easier. Bronchodilators are either short-acting (work for 6 to 9 hours) or long-acting (work for 24 hours). You inhale most bronchodilators, so they start to act quickly. Always carry your quick-relief inhaler with you in case you need it while you are away from home. ¨ Corticosteroids (prednisone, budesonide). These reduce airway inflammation. They come in pill or inhaled form. You must take these medicines every day for them to work well. ? · A spacer may help you get more inhaled medicine to your lungs. Ask your doctor or pharmacist if a spacer is right for you. If it is, ask how to use it properly. ? · Do not take any vitamins, over-the-counter medicine, or herbal products without talking to your doctor first.   ? · If your doctor prescribed antibiotics, take them as directed. Do not stop taking them just because you feel better. You need to take the full course of antibiotics. ? · Oxygen therapy boosts the amount of oxygen in your blood and helps you breathe easier. Use the flow rate your doctor has recommended, and do not change it without talking to your doctor first.   Activity  ? · Get regular exercise. Walking is an easy way to get exercise. Start out slowly, and walk a little more each day. ? · Pay attention to your breathing. You are exercising too hard if you cannot talk while you are exercising.    ? · Take short rest breaks when doing household chores and other activities. ? · Learn breathing methods-such as breathing through pursed lips-to help you become less short of breath. ? · If your doctor has not set you up with a pulmonary rehabilitation program, talk to him or her about whether rehab is right for you. Rehab includes exercise programs, education about your disease and how to manage it, help with diet and other changes, and emotional support. Diet  ? · Eat regular, healthy meals. Use bronchodilators about 1 hour before you eat to make it easier to eat. Eat several small meals instead of three large ones. Drink beverages at the end of the meal. Avoid foods that are hard to chew. ? · Eat foods that contain protein so that you do not lose muscle mass. ? · Talk with your doctor if you gain too much weight or if you lose weight without trying. ?Mental health  ? · Talk to your family, friends, or a therapist about your feelings. It is normal to feel frightened, angry, hopeless, helpless, and even guilty. Talking openly about bad feelings can help you cope. If these feelings last, talk to your doctor. When should you call for help? Call 911 anytime you think you may need emergency care. For example, call if:  ? · You have severe trouble breathing. ?Call your doctor now or seek immediate medical care if:  ? · You have new or worse trouble breathing. ? · You cough up blood. ? · You have a fever. ? Watch closely for changes in your health, and be sure to contact your doctor if:  ? · You cough more deeply or more often, especially if you notice more mucus or a change in the color of your mucus. ? · You have new or worse swelling in your legs or belly. ? · You are not getting better as expected. Where can you learn more? Go to http://buffy-christiano.info/. Michelle Ramirez in the search box to learn more about \"Chronic Obstructive Pulmonary Disease (COPD): Care Instructions. \"  Current as of:  May 12, 2017  Content Version: 11.4  © 6325-8586 [employer]. Care instructions adapted under license by [employer] (which disclaims liability or warranty for this information). If you have questions about a medical condition or this instruction, always ask your healthcare professional. [address] any warranty or liability for your use of this information. Cirrhosis: Care Instructions  Your Care Instructions    Cirrhosis occurs when healthy tissue in your liver gets scarred. This keeps the liver from working well. It usually happens after a liver has been inflamed for years. Cirrhosis is most often caused by alcohol abuse or hepatitis infection. But there are other causes too. These include medicines and too much fat in the liver. Conditions passed down in families and other disorders can also cause it. In some cases, no cause can be found. Treatment can't completely fix liver damage. But you may be able to slow or prevent more damage if you don't drink alcohol or use drugs that harm your liver. Follow-up care is a key part of your treatment and safety. Be sure to make and go to all appointments, and call your doctor if you are having problems. It's also a good idea to know your test results and keep a list of the medicines you take. How can you care for yourself at home? · Do not drink any alcohol. It can harm your liver. Talk to your doctor if you need help to stop drinking. · Be safe with medicines. Take your medicines exactly as prescribed. Call your doctor if you think you are having a problem with your medicine. · Talk to your doctor before you take any other medicines. These include over-the-counter medicines and herbal products. · Be careful taking acetaminophen (Tylenol), ibuprofen (Advil, Motrin), or naproxen (Aleve). These can sometimes cause more liver damage. Talk with your doctor if you're not sure which medicines are safe.   · If your cirrhosis causes extra fluid to build up in your body, try not to eat a lot of salt. Use less salt when you cook and at the table. Don't eat fast foods or snack foods with a lot of salt. Extra fluid in your belly, legs, and chest can cause serious problems. · Work with your doctor or a dietitian to be sure you eat the right amount of carbohydrate, protein, fat, and sodium (salt). It's very important to choose the best foods for the health of your liver. · If your doctor recommends it, limit how much fluid you drink. · If your doctor recommends it, get more exercise. Walking is a good choice. Bit by bit, increase the amount you walk every day. Try for at least 30 minutes on most days of the week. You also may want to swim, bike, or do other activities. When should you call for help? Call 911 anytime you think you may need emergency care. For example, call if:  ? · You have trouble breathing. ? · You vomit blood or what looks like coffee grounds. ?Call your doctor now or seek immediate medical care if:  ? · You feel very sleepy or confused. ? · You have new belly pain, or your pain gets worse. ? · You have a fever. ? · There is a new or increasing yellow tint to your skin or the whites of your eyes. ? · You have any abnormal bleeding, such as:  ¨ Nosebleeds. ¨ Vaginal bleeding that is different (heavier, more frequent, at a different time of the month) than what you are used to. ¨ Bloody or black stools, or rectal bleeding. ¨ Bloody or pink urine. ? Watch closely for changes in your health, and be sure to contact your doctor if:  ? · You have any problems. ? · Your belly is getting bigger. ? · You are gaining weight. Where can you learn more? Go to http://buffy-christiano.info/. Enter M412 in the search box to learn more about \"Cirrhosis: Care Instructions. \"  Current as of: May 12, 2017  Content Version: 11.4  © 2022-7187 [employer], [employer].  Care instructions adapted under license by [address] (which disclaims liability or warranty for this information). If you have questions about a medical condition or this instruction, always ask your healthcare professional. [address] any warranty or liability for your use of this information.

## 2018-02-03 ENCOUNTER — TELEPHONE (OUTPATIENT)
Dept: FAMILY MEDICINE CLINIC | Age: 68
End: 2018-02-03

## 2018-02-04 NOTE — TELEPHONE ENCOUNTER
I attempted to reach pt about his lab results, no answer on [phone] and so left general  asking pt call clinic about lab results. The home # is not in service.     I then called Neri Parry, who is on permission to release info form and  msg identified it was her phones, so I left more detailed msg to say pt's renal function was normal and he should take the levofloxacin 750mg tabs once daily

## 2018-02-04 NOTE — PROGRESS NOTES
CBC significant for WBC 16.3, may be steroid effect. Hgb normal and PLTs better than previous at 99  CMP - Cr now wnl and GFP > 50 so pt can take levaquin daily.  Bilirubin remains high, albumin low and enzymes now normal, pt with cirrhosis  INR pending, will message lab to see when to be released

## 2018-02-05 ENCOUNTER — TELEPHONE (OUTPATIENT)
Dept: FAMILY MEDICINE CLINIC | Age: 68
End: 2018-02-05

## 2018-02-05 NOTE — TELEPHONE ENCOUNTER
Telephone message left on the [facility] voicemail from Micha Crowell at [facility] lab stating that they have an order for PT/INR, but did not receive a blue top tube. Patient seen at [facility] on 02-01-18. Routing to the provider to ask if she would like for the patient to return to [facility] for another lab draw.  Roge Alvarado RN

## 2018-02-05 NOTE — TELEPHONE ENCOUNTER
See 2/3/18 phone call from Dr Mariel Roberson - gave him Dr Floyd Led message. He cannot afford the antibiotic. Texted him a GoodRx to get the meds for $5.62 which he can afford. Pt also complained he is going to the restroom yamil 15-20 minutes and is voiding a lot. He is drinking a lot of water. No problems with hesitation or stream.   Suggested he cut back a little on the water he is drinking. If that does not help, make an appointment to be seen.

## 2018-02-08 ENCOUNTER — TELEPHONE (OUTPATIENT)
Dept: FAMILY MEDICINE CLINIC | Age: 68
End: 2018-02-08

## 2018-02-08 NOTE — TELEPHONE ENCOUNTER
Pt calling to report \"my sinuses are bleeding to my throat when I sleep at night\". Patient reports being able to clear his throat and cough it all up in the morning. Pt also reporting having chills for last couple days on and off. Patient has not used the nebulizer treatments either because he says it is too cold for this throat. Patient has not picked up his nystatin swish and swallow due to the price. I have called his [address] to inquire if pt can get a bottle size that is more affordable of the nystatin. Please advise.

## 2018-02-15 ENCOUNTER — HOSPITAL ENCOUNTER (OUTPATIENT)
Dept: LAB | Age: 68
Discharge: HOME | End: 2018-02-15

## 2018-02-15 ENCOUNTER — OFFICE VISIT (OUTPATIENT)
Dept: FAMILY MEDICINE CLINIC | Age: 68
End: 2018-02-15

## 2018-02-15 VITALS
HEART RATE: 80 BPM | BODY MASS INDEX: 36.16 KG/M2 | WEIGHT: 245 LBS | DIASTOLIC BLOOD PRESSURE: 59 MMHG | SYSTOLIC BLOOD PRESSURE: 129 MMHG | OXYGEN SATURATION: 95 % | TEMPERATURE: 97.9 F

## 2018-02-15 DIAGNOSIS — J32.9 CHRONIC SINUSITIS, UNSPECIFIED LOCATION: ICD-10-CM

## 2018-02-15 DIAGNOSIS — E11.9 TYPE 2 DIABETES MELLITUS WITHOUT COMPLICATION, WITHOUT LONG-TERM CURRENT USE OF INSULIN (HCC): Primary | ICD-10-CM

## 2018-02-15 DIAGNOSIS — J44.9 CHRONIC OBSTRUCTIVE PULMONARY DISEASE, UNSPECIFIED COPD TYPE (HCC): ICD-10-CM

## 2018-02-15 DIAGNOSIS — K74.60 CIRRHOSIS OF LIVER WITHOUT ASCITES, UNSPECIFIED HEPATIC CIRRHOSIS TYPE (HCC): ICD-10-CM

## 2018-02-15 LAB
ALBUMIN SERPL-MCNC: 2.6 G/DL (ref 3.5–5)
ALBUMIN/GLOB SERPL: 0.7 {RATIO} (ref 1.1–2.2)
ALP SERPL-CCNC: 163 U/L (ref 45–117)
ALT SERPL-CCNC: 30 U/L (ref 12–78)
ANION GAP SERPL CALC-SCNC: 9 MMOL/L (ref 5–15)
AST SERPL-CCNC: 34 U/L (ref 15–37)
BILIRUB SERPL-MCNC: 0.8 MG/DL (ref 0.2–1)
BUN SERPL-MCNC: 12 MG/DL (ref 6–20)
BUN/CREAT SERPL: 10 (ref 12–20)
CALCIUM SERPL-MCNC: 8.6 MG/DL (ref 8.5–10.1)
CHLORIDE SERPL-SCNC: 107 MMOL/L (ref 97–108)
CO2 SERPL-SCNC: 26 MMOL/L (ref 21–32)
CREAT SERPL-MCNC: 1.21 MG/DL (ref 0.7–1.3)
GLOBULIN SER CALC-MCNC: 4 G/DL (ref 2–4)
GLUCOSE POC: NORMAL MG/DL
GLUCOSE SERPL-MCNC: 164 MG/DL (ref 65–100)
INR PPP: 1.2 (ref 0.9–1.1)
POTASSIUM SERPL-SCNC: 3.3 MMOL/L (ref 3.5–5.1)
PROT SERPL-MCNC: 6.6 G/DL (ref 6.4–8.2)
PROTHROMBIN TIME: 11.9 SEC (ref 9–11.1)
SODIUM SERPL-SCNC: 142 MMOL/L (ref 136–145)

## 2018-02-15 PROCEDURE — 85610 PROTHROMBIN TIME: CPT | Performed by: NURSE PRACTITIONER

## 2018-02-15 PROCEDURE — 80053 COMPREHEN METABOLIC PANEL: CPT | Performed by: NURSE PRACTITIONER

## 2018-02-15 RX ORDER — MONTELUKAST SODIUM 10 MG/1
10 TABLET ORAL DAILY
Qty: 90 TAB | Refills: 3 | Status: SHIPPED | OUTPATIENT
Start: 2018-02-15 | End: 2019-03-15 | Stop reason: SDUPTHER

## 2018-02-15 NOTE — PROGRESS NOTES
Richelle Kaiser seen at d/c, given AVS and reviewed today's visit with patient. Patient here to  PAP medication advair. [facility] pharmacy list h/o given to pt. All questions were answered to patient satisfaction.

## 2018-02-15 NOTE — TELEPHONE ENCOUNTER
F/u call placed to pt amd to notify him of the pap medication advair that is ready of rpickup. Left pt a VM to call [facility].

## 2018-02-15 NOTE — MR AVS SNAPSHOT
[address] [address] 
[phone] Patient: Kelly Benitez MRN: [id number] WGE:[id number] Visit Information Date & Time Provider Department Dept. Phone Encounter #  
 2/15/2018  8:50 AM [facility] [phone] [id number] Follow-up Instructions Return in about 6 weeks (around 3/29/2018). Upcoming Health Maintenance Date Due COLONOSCOPY 2/19/1969 DTaP/Tdap/Td series (1 - Tdap) 2/19/1972 ZOSTER VACCINE AGE 60> 12/19/2010 EYE EXAM RETINAL OR DILATED Q1 7/1/2014 GLAUCOMA SCREENING Q2Y 2/19/2016 FOOT EXAM Q1 10/13/2016 MEDICARE YEARLY EXAM 8/11/2017 HEMOGLOBIN A1C Q6M 12/21/2017 MICROALBUMIN Q1 6/21/2018 LIPID PANEL Q1 10/25/2018 Allergies as of 2/15/2018  Review Complete On: 2/15/2018 By: Alex Lanza NP Severity Noted Reaction Type Reactions Bactrim [Sulfamethoprim Ds]  12/03/2015    Diarrhea, Nausea and Vomiting After on 1st dose. Morphine  08/15/2011    Unknown (comments) Current Immunizations  Reviewed on 10/25/2017 Name Date Influenza Vaccine 10/22/2014, 1/24/2014, 10/1/2012 Influenza Vaccine (Quad) PF 10/25/2017, 12/28/2016, 10/22/2015 Pneumococcal Polysaccharide (PPSV-23) 12/28/2016 Pneumococcal Vaccine (Unspecified Type) 1/1/2010 Not reviewed this visit You Were Diagnosed With   
  
 Codes Comments Type 2 diabetes mellitus without complication, without long-term current use of insulin (HCC)    -  Primary ICD-10-CM: E11.9 ICD-9-CM: 250.00 Chronic obstructive pulmonary disease, unspecified COPD type ([facility]     ICD-10-CM: J44.9 ICD-9-CM: 729 Cirrhosis of liver without ascites, unspecified hepatic cirrhosis [facility]     ICD-10-CM: K74.60 ICD-9-CM: 571.5 Chronic sinusitis, unspecified location     ICD-10-CM: J32.9 ICD-9-CM: 473.9 Vitals BP Pulse Temp Weight(growth percentile) SpO2 BMI  
 129/59 (BP 1 Location: Left arm, BP Patient Position: Sitting) 80 97.9 °F (36.6 °C) (Oral) 245 lb (111.1 kg) 95% 36.16 kg/m2 Smoking Status Former Smoker Vitals History BMI and BSA Data Body Mass Index Body Surface Area  
 36.16 kg/m 2 2.33 m 2 Preferred Pharmacy Pharmacy Name Phone [facility] [address] , [address] [place] [address] [phone] Your Updated Medication List  
  
   
This list is accurate as of: 2/15/18  9:33 AM.  Always use your most recent med list.  
  
  
  
  
 ADVAIR DISKUS 500-50 mcg/dose diskus inhaler Generic drug:  fluticasone-salmeterol Take 1 Puff by inhalation every twelve (12) hours. albuterol 2.5 mg /3 mL (0.083 %) nebulizer solution Commonly known as:  PROVENTIL VENTOLIN  
3 mL by Nebulization route every four (4) hours as needed for Wheezing. cyclobenzaprine 10 mg tablet Commonly known as:  FLEXERIL  
TAKE ONE TABLET BY MOUTH THREE TIMES A DAY AS NEEDED FOR MUSCLE SPASMS  
  
 ipratropium 0.02 % Soln Commonly known as:  ATROVENT  
2.5 mL by Nebulization route four (4) times daily as needed. levothyroxine 88 mcg tablet Commonly known as:  SYNTHROID  
TAKE ONE TABLET BY MOUTH EVERY MORNING BEFORE BREAKFAST  
  
 losartan-hydroCHLOROthiazide 100-25 mg per tablet Commonly known as:  HYZAAR Take 1 Tab by mouth daily. lovastatin 20 mg tablet Commonly known as:  MEVACOR Take 1 Tab by mouth nightly. metFORMIN  mg tablet Commonly known as:  GLUCOPHAGE XR  
TAKE ONE TABLET BY MOUTH DAILY  
  
 metoprolol tartrate 50 mg tablet Commonly known as:  LOPRESSOR  
TAKE ONE TABLET BY MOUTH TWICE A DAY  
  
 montelukast 10 mg tablet Commonly known as:  SINGULAIR Take 1 Tab by mouth daily. nystatin 100,000 unit/mL suspension Commonly known as:  MYCOSTATIN  
 Take 5 mL by mouth four (4) times daily. swish and swallow  
  
 terazosin 1 mg capsule Commonly known as:  HYTRIN Take 1 Cap by mouth nightly. VIAGRA 100 mg tablet Generic drug:  sildenafil citrate Take 1 Tab by mouth as needed. Prescriptions Printed Refills  
 montelukast (SINGULAIR) 10 mg tablet 3 Sig: Take 1 Tab by mouth daily. Class: Print Route: Oral  
  
We Performed the Following AMB POC GLUCOSE BLOOD, BY GLUCOSE MONITORING DEVICE [81700 CPT(R)] Follow-up Instructions Return in about 6 weeks (around 3/29/2018). To-Do List   
 02/15/2018 Lab:  METABOLIC PANEL, COMPREHENSIVE   
  
 02/15/2018 Lab:  PROTHROMBIN TIME + INR Patient Instructions Montelukast (By mouth) Montelukast (eyd-ur-ECF-kast) Prevents asthma attacks and treats allergies. Brand Name(s): Singulair There may be other brand names for this medicine. When This Medicine Should Not Be Used: This medicine is not right for everyone. Do not use it if you had an allergic reaction to montelukast. 
How to Use This Medicine:  
Packet, Tablet, Chewable Tablet · Your doctor will tell you how much medicine to use. Do not use more than directed. · Read and follow the patient instructions that come with this medicine. Talk to your doctor or pharmacist if you have any questions. · Oral granules: Do not open the packet until you are ready to use it. You can give the oral granules in one of three ways. ¨ Put the oral granules directly on a spoon, then into the person's mouth. ¨ Mix the granules with baby formula or breast milk that is cold or room temperature. Do not mix the granules with any other liquid. ¨ Mix the granules with applesauce, mashed carrots, rice, or ice cream. Do not mix the granules with any other type of soft food. ¨ If the medicine is mixed with formula, breast milk, or food, use it right away. Do not save any mixed medicine to use later. · Missed dose: Take a dose as soon as you remember. If it is almost time for your next dose, wait until then and take a regular dose. Do not take extra medicine to make up for a missed dose. · Store the medicine in the original container at room temperature, away from heat and direct light. Drugs and Foods to Avoid: Ask your doctor or pharmacist before using any other medicine, including over-the-counter medicines, vitamins, and herbal products. Warnings While Using This Medicine: · Tell your doctor if you are pregnant or breastfeeding, or if you have liver disease, have a condition called phenylketonuria (PKU), or are allergic to aspirin. · This medicine will not stop an asthma attack that has already started. Your doctor may prescribe another medicine for a sudden asthma attack. · If you use a corticosteroid medicine to control your asthma, keep using it as instructed by your doctor. · If any of your asthma medicines do not seem to be working as well as usual, call your doctor right away. Do not change your doses or stop using your medicines without asking your doctor. · This medicine may cause some people to be agitated, disoriented, irritable, or reckless. It may also cause depression or suicidal thoughts. Tell your doctor if you have any unusual thoughts or behaviors that trouble you. · This medicine may make you dizzy or drowsy. Do not drive or do anything else that could be dangerous until you know how this medicine affects you. · Keep all medicine out of the reach of children. Never share your medicine with anyone. Possible Side Effects While Using This Medicine:  
Call your doctor right away if you notice any of these side effects: · Allergic reaction: Itching or hives, swelling in your face or hands, swelling or tingling in your mouth or throat, chest tightness, trouble breathing · Blistering, peeling, red skin rash · Chest pain, or a fast, pounding, or uneven heartbeat · Numbness or tingling in the hands, arms, legs, or feet · Pain and swelling in your sinuses · Restlessness, anxiety, irritability, mood or behavior changes, or thoughts of hurting yourself or others · Sudden and severe stomach pain, nausea, vomiting, fever, lightheadedness · Unusual bleeding or bruising If you notice these less serious side effects, talk with your doctor: · Cough, sore throat, or flu symptoms · Headache If you notice other side effects that you think are caused by this medicine, tell your doctor. Call your doctor for medical advice about side effects. You may report side effects to FDA at 2-657-CXL-0757 © 2017 [address] Information is for End User's use only and may not be sold, redistributed or otherwise used for commercial purposes. The above information is an  only. It is not intended as medical advice for individual conditions or treatments. Talk to your doctor, nurse or pharmacist before following any medical regimen to see if it is safe and effective for you. Introducing [facility]! Doc Bustillo introduces [employer] patient portal. Now you can access parts of your medical record, email your doctor's office, and request medication refills online. 1. In your internet browser, go to [email or web address] 2. Click on the First Time User? Click Here link in the Sign In box. You will see the New Member Sign Up page. 3. Enter your [employer] Access Code exactly as it appears below. You will not need to use this code after youve completed the sign-up process. If you do not sign up before the expiration date, you must request a new code. · [employer] Access Code: [email or web address] Expires: 4/26/2018 11:35 AM 
 
4. Enter the last four digits of your Social Security Number (xxxx) and Date of Birth (mm/dd/yyyy) as indicated and click Submit. You will be taken to the next sign-up page. 5. Create a [employer] ID. This will be your [employer] login ID and cannot be changed, so think of one that is secure and easy to remember. 6. Create a [employer] password. You can change your password at any time. 7. Enter your Password Reset Question and Answer. This can be used at a later time if you forget your password. 8. Enter your e-mail address. You will receive e-mail notification when new information is available in [address] 9. Click Sign Up. You can now view and download portions of your medical record. 10. Click the Download Summary menu link to download a portable copy of your medical information. If you have questions, please visit the Frequently Asked Questions section of the [employer] website. Remember, [employer] is NOT to be used for urgent needs. For medical emergencies, dial 911. Now available from your iPhone and Android! Please provide this summary of care documentation to your next provider. Your primary care clinician is listed as Aubrie Echavarria. If you have any questions after today's visit, please call [phone].

## 2018-02-15 NOTE — PATIENT INSTRUCTIONS
Montelukast (By mouth)   Montelukast (mdm-hp-FIL-kast)  Prevents asthma attacks and treats allergies. Brand Name(s): Singulair   There may be other brand names for this medicine. When This Medicine Should Not Be Used: This medicine is not right for everyone. Do not use it if you had an allergic reaction to montelukast.  How to Use This Medicine:   Packet, Tablet, Chewable Tablet  · Your doctor will tell you how much medicine to use. Do not use more than directed. · Read and follow the patient instructions that come with this medicine. Talk to your doctor or pharmacist if you have any questions. · Oral granules: Do not open the packet until you are ready to use it. You can give the oral granules in one of three ways. ¨ Put the oral granules directly on a spoon, then into the person's mouth. ¨ Mix the granules with baby formula or breast milk that is cold or room temperature. Do not mix the granules with any other liquid. ¨ Mix the granules with applesauce, mashed carrots, rice, or ice cream. Do not mix the granules with any other type of soft food. ¨ If the medicine is mixed with formula, breast milk, or food, use it right away. Do not save any mixed medicine to use later. · Missed dose: Take a dose as soon as you remember. If it is almost time for your next dose, wait until then and take a regular dose. Do not take extra medicine to make up for a missed dose. · Store the medicine in the original container at room temperature, away from heat and direct light. Drugs and Foods to Avoid:      Ask your doctor or pharmacist before using any other medicine, including over-the-counter medicines, vitamins, and herbal products. Warnings While Using This Medicine:   · Tell your doctor if you are pregnant or breastfeeding, or if you have liver disease, have a condition called phenylketonuria (PKU), or are allergic to aspirin. · This medicine will not stop an asthma attack that has already started.  Your doctor may prescribe another medicine for a sudden asthma attack. · If you use a corticosteroid medicine to control your asthma, keep using it as instructed by your doctor. · If any of your asthma medicines do not seem to be working as well as usual, call your doctor right away. Do not change your doses or stop using your medicines without asking your doctor. · This medicine may cause some people to be agitated, disoriented, irritable, or reckless. It may also cause depression or suicidal thoughts. Tell your doctor if you have any unusual thoughts or behaviors that trouble you. · This medicine may make you dizzy or drowsy. Do not drive or do anything else that could be dangerous until you know how this medicine affects you. · Keep all medicine out of the reach of children. Never share your medicine with anyone. Possible Side Effects While Using This Medicine:   Call your doctor right away if you notice any of these side effects:  · Allergic reaction: Itching or hives, swelling in your face or hands, swelling or tingling in your mouth or throat, chest tightness, trouble breathing  · Blistering, peeling, red skin rash  · Chest pain, or a fast, pounding, or uneven heartbeat  · Numbness or tingling in the hands, arms, legs, or feet  · Pain and swelling in your sinuses  · Restlessness, anxiety, irritability, mood or behavior changes, or thoughts of hurting yourself or others  · Sudden and severe stomach pain, nausea, vomiting, fever, lightheadedness  · Unusual bleeding or bruising  If you notice these less serious side effects, talk with your doctor:   · Cough, sore throat, or flu symptoms  · Headache  If you notice other side effects that you think are caused by this medicine, tell your doctor. Call your doctor for medical advice about side effects.  You may report side effects to FDA at 6-789-FDA-1234  © 2017 [address] Information is for End User's use only and may not be sold, redistributed or otherwise used for commercial purposes. The above information is an  only. It is not intended as medical advice for individual conditions or treatments. Talk to your doctor, nurse or pharmacist before following any medical regimen to see if it is safe and effective for you.

## 2018-02-15 NOTE — PROGRESS NOTES
Coordination of Care  1. Have you been to the ER, urgent care clinic since your last visit? Hospitalized since your last visit? No    2. Have you seen or consulted any other health care providers outside of the [address] [place] [address] since your last visit? Include any pap smears or colon screening. No    Medications  Does the patient need refills?  YES    Learning Assessment Complete? yes  Results for orders placed or performed in visit on 02/15/18   AMB POC GLUCOSE BLOOD, BY GLUCOSE MONITORING DEVICE   Result Value Ref Range    Glucose POC  mg/dL

## 2018-02-15 NOTE — PROGRESS NOTES
Subjective:     Chief Complaint   Patient presents with    Cough     f/u for cough and flu         He  is a 78 y.o. male who presents for evaluation of COPD exacerbation/influenza infection. Since LOV, Pt reports he has feeling better r/t his breathing and overall well being. Confirms he completed Abx course and prednisone. Also confirms he rinses his mouth out after using Advair. Notes continued sinus congestion and rhinorrhea. Is not using any OTC nor home remedies. Is using less albuterol BID now vs q 4 hours. ROS  Gen - no fever/chills  Resp - no dyspnea or cough  CV - no chest pain or PERRY  Rest per HPI    Past Medical History:   Diagnosis Date    Blurred vision     Cirrhosis, alcoholic (HCC)     COPD bronchitis     Diabetes mellitus ([address]     Ear discomfort     History of ETOH abuse     HTN (hypertension)     SOB (shortness of breath)     Thrombocytopenia ([address]     Trouble in sleeping      Past Surgical History:   Procedure Laterality Date    HX CATARACT REMOVAL Right 2007    HX CATARACT REMOVAL Left 2012    HX CIRCUMCISION      at 26 y/o    HX HERNIA REPAIR  as a youth    right side    HX SHOULDER ARTHROSCOPY      left x 3 for torn rotator cuff muscle     Current Outpatient Prescriptions on File Prior to Visit   Medication Sig Dispense Refill    albuterol (PROVENTIL VENTOLIN) 2.5 mg /3 mL (0.083 %) nebulizer solution 3 mL by Nebulization route every four (4) hours as needed for Wheezing. 180 Each 5    ipratropium (ATROVENT) 0.02 % soln 2.5 mL by Nebulization route four (4) times daily as needed. 180 mL 5    levoFLOXacin (LEVAQUIN) 750 mg tablet Take 1 Tab by mouth every fourty-eight (48) hours. 5 Tab 0    nystatin (MYCOSTATIN) 100,000 unit/mL suspension Take 5 mL by mouth four (4) times daily.  swish and swallow 473 mL 0    predniSONE (DELTASONE) 20 mg tablet Sig: 3 po for 3 days, then 2 po for 3 days then 1 po for 3 days 18 Tab 0    cyclobenzaprine (FLEXERIL) 10 mg tablet TAKE ONE TABLET BY MOUTH THREE TIMES A DAY AS NEEDED FOR MUSCLE SPASMS 30 Tab 1    metoprolol tartrate (LOPRESSOR) 50 mg tablet TAKE ONE TABLET BY MOUTH TWICE A  Tab 1    VIAGRA 100 mg tablet Take 1 Tab by mouth as needed. 30 Tab 3    metFORMIN ER (GLUCOPHAGE XR) 500 mg tablet TAKE ONE TABLET BY MOUTH DAILY 90 Tab 1    lovastatin (MEVACOR) 20 mg tablet Take 1 Tab by mouth nightly. 90 Tab 1    levothyroxine (SYNTHROID) 88 mcg tablet TAKE ONE TABLET BY MOUTH EVERY MORNING BEFORE BREAKFAST 90 Tab 3    terazosin (HYTRIN) 1 mg capsule Take 1 Cap by mouth nightly. 30 Cap 3    losartan-hydroCHLOROthiazide (HYZAAR) 100-25 mg per tablet Take 1 Tab by mouth daily. 90 Tab 3    fluticasone-salmeterol (ADVAIR DISKUS) 500-50 mcg/dose diskus inhaler Take 1 Puff by inhalation every twelve (12) hours.  tiotropium (SPIRIVA WITH HANDIHALER) 18 mcg inhalation capsule Take 1 Cap by inhalation daily. No current facility-administered medications on file prior to visit. Objective:     Vitals:    02/15/18 0851   BP: 129/59   Pulse: 80   Temp: 97.9 °F (36.6 °C)   TempSrc: Oral   SpO2: 95%   Weight: 245 lb (111.1 kg)       Physical Examination:  General appearance - alert, well appearing, and in no distress  Eyes -sclera anicteric  Neck - supple, no significant adenopathy, no thyromegaly  Chest - clear to auscultation, no wheezes, rales or rhonchi, symmetric air entry  Heart - normal rate, regular rhythm, normal S1, S2, no murmurs, rubs, clicks or gallops  Neurological - alert, oriented, no focal findings or movement disorder noted    Oropharynx and buccal mucosa neg for thrush     Noted bilateral inflammation in turbinates     Assessment/ Plan:   Diagnoses and all orders for this visit:    1. Type 2 diabetes mellitus without complication, without long-term current use of insulin (HCC)  -     AMB POC GLUCOSE BLOOD, BY GLUCOSE MONITORING DEVICE    2.  Chronic obstructive pulmonary disease, unspecified COPD type ([facility]  -     montelukast (SINGULAIR) 10 mg tablet; Take 1 Tab by mouth daily. 3. Cirrhosis of liver without ascites, unspecified hepatic cirrhosis type (HCC)  -     METABOLIC PANEL, COMPREHENSIVE; Future  -     PROTHROMBIN TIME + INR; Future    4. Chronic sinusitis, unspecified location  -     montelukast (SINGULAIR) 10 mg tablet; Take 1 Tab by mouth daily. Given Pt's concerns re: cost of Rx, even $4 ones, recommend he try Singulair QHS to help both chronic sinusitis and COPD. Pt given [facility] and [facility] info where it is free. Reinforced daily dosing x several weeks to gauge effect. Discussed using saline nasal spray QID PRN and saline gargles to help w/ post nasal drip. Repeat CMP and INR to confirm trends of LFTs/bili and INR, not drawn at [address] [place] [address]. Pt also picked up Advair PAP Rx today, reinforced rinsing post admin to prevent thrush. Re-eval in 4-6 weeks. I have discussed the diagnosis with the patient and the intended plan as seen in the above orders. The patient has received an after-visit summary and questions were answered concerning future plans. I have discussed medication side effects and warnings with the patient as well. The patient verbalizes understanding and agreement with the plan.     Follow-up Disposition: Not on File

## 2018-02-27 ENCOUNTER — CLINICAL SUPPORT (OUTPATIENT)
Dept: FAMILY MEDICINE CLINIC | Age: 68
End: 2018-02-27

## 2018-02-27 DIAGNOSIS — N52.9 ERECTILE DYSFUNCTION, UNSPECIFIED ERECTILE DYSFUNCTION TYPE: Primary | ICD-10-CM

## 2018-03-02 DIAGNOSIS — R35.0 BENIGN PROSTATIC HYPERPLASIA WITH URINARY FREQUENCY: ICD-10-CM

## 2018-03-02 DIAGNOSIS — N40.1 BENIGN PROSTATIC HYPERPLASIA WITH URINARY FREQUENCY: ICD-10-CM

## 2018-03-02 RX ORDER — TERAZOSIN 1 MG/1
CAPSULE ORAL
Qty: 30 CAP | Refills: 2 | Status: SHIPPED | OUTPATIENT
Start: 2018-03-02 | End: 2018-05-08 | Stop reason: SDUPTHER

## 2018-03-23 ENCOUNTER — TELEPHONE (OUTPATIENT)
Dept: FAMILY MEDICINE CLINIC | Age: 68
End: 2018-03-23

## 2018-03-23 NOTE — TELEPHONE ENCOUNTER
Pt c/o of \"cold symptoms\" and back pain, feels like he may have a low grade fever. Pt asking for cough syrup or something to help him.

## 2018-03-23 NOTE — TELEPHONE ENCOUNTER
Call returned and general phone message left that [address] was returning call. Routing to Dr Tyler Urena.

## 2018-03-28 ENCOUNTER — HOSPITAL ENCOUNTER (OUTPATIENT)
Dept: LAB | Age: 68
Discharge: HOME | End: 2018-03-28

## 2018-03-28 ENCOUNTER — OFFICE VISIT (OUTPATIENT)
Dept: FAMILY MEDICINE CLINIC | Age: 68
End: 2018-03-28

## 2018-03-28 VITALS
WEIGHT: 242 LBS | SYSTOLIC BLOOD PRESSURE: 122 MMHG | OXYGEN SATURATION: 93 % | BODY MASS INDEX: 35.72 KG/M2 | DIASTOLIC BLOOD PRESSURE: 61 MMHG | TEMPERATURE: 97.8 F

## 2018-03-28 DIAGNOSIS — E11.21 TYPE 2 DIABETES MELLITUS WITH NEPHROPATHY (HCC): ICD-10-CM

## 2018-03-28 DIAGNOSIS — M62.830 BACK SPASM: ICD-10-CM

## 2018-03-28 DIAGNOSIS — E11.21 TYPE 2 DIABETES MELLITUS WITH NEPHROPATHY (HCC): Primary | ICD-10-CM

## 2018-03-28 LAB
ALBUMIN SERPL-MCNC: 2.9 G/DL (ref 3.5–5)
ALBUMIN/GLOB SERPL: 0.7 {RATIO} (ref 1.1–2.2)
ALP SERPL-CCNC: 139 U/L (ref 45–117)
ALT SERPL-CCNC: 52 U/L (ref 12–78)
ANION GAP SERPL CALC-SCNC: 9 MMOL/L (ref 5–15)
AST SERPL-CCNC: 53 U/L (ref 15–37)
BILIRUB SERPL-MCNC: 0.9 MG/DL (ref 0.2–1)
BUN SERPL-MCNC: 17 MG/DL (ref 6–20)
BUN/CREAT SERPL: 13 (ref 12–20)
CALCIUM SERPL-MCNC: 8.7 MG/DL (ref 8.5–10.1)
CHLORIDE SERPL-SCNC: 107 MMOL/L (ref 97–108)
CO2 SERPL-SCNC: 27 MMOL/L (ref 21–32)
CREAT SERPL-MCNC: 1.27 MG/DL (ref 0.7–1.3)
EST. AVERAGE GLUCOSE BLD GHB EST-MCNC: 123 MG/DL
GLOBULIN SER CALC-MCNC: 4 G/DL (ref 2–4)
GLUCOSE POC: NORMAL MG/DL
GLUCOSE SERPL-MCNC: 166 MG/DL (ref 65–100)
HBA1C MFR BLD: 5.9 % (ref 4.2–6.3)
POTASSIUM SERPL-SCNC: 2.8 MMOL/L (ref 3.5–5.1)
PROT SERPL-MCNC: 6.9 G/DL (ref 6.4–8.2)
SODIUM SERPL-SCNC: 143 MMOL/L (ref 136–145)

## 2018-03-28 PROCEDURE — 80053 COMPREHEN METABOLIC PANEL: CPT | Performed by: NURSE PRACTITIONER

## 2018-03-28 PROCEDURE — 83036 HEMOGLOBIN GLYCOSYLATED A1C: CPT | Performed by: NURSE PRACTITIONER

## 2018-03-28 RX ORDER — METHOCARBAMOL 500 MG/1
500 TABLET, FILM COATED ORAL
Qty: 30 TAB | Refills: 0 | Status: SHIPPED | OUTPATIENT
Start: 2018-03-28 | End: 2018-05-08 | Stop reason: ALTCHOICE

## 2018-03-28 NOTE — PROGRESS NOTES
Subjective:     Chief Complaint   Patient presents with    Back Pain     Back pain         He  is a 80 y.o. male who presents for evaluation of back pain and DM. Onset approx 2-3 weeks ago. No injury/fall. Notes prior Hx of slipped disc at L4-L5 in 1994 and chronic Hx of LBP, no sciatica. Took Abx for the \"inflammation\" previously and noted prompt improvement. Notes improvement w/ walking, pain increased at rest/prolonged sitting. No attempted remedies. Unsure of any improvement with singulair use. Continues to use saline nasal spray and gargles which have helped. No assoc dysphagia, coffee ground emesis nor lymphadenopathy. Reports remote Hx of ENT visit and was advised to use nasal spray and allergy Rx. No Dx of sinus polyps nor     Does not check glucoses at home. ROS  Gen - no fever/chills  Resp - no dyspnea or cough  CV - no chest pain or PERRY  Rest per HPI    Past Medical History:   Diagnosis Date    Blurred vision     Cirrhosis, alcoholic (HCC)     COPD bronchitis     Diabetes mellitus ([address]     Ear discomfort     History of ETOH abuse     HTN (hypertension)     SOB (shortness of breath)     Thrombocytopenia ([address]     Trouble in sleeping      Past Surgical History:   Procedure Laterality Date    HX CATARACT REMOVAL Right 2007    HX CATARACT REMOVAL Left 2012    HX CIRCUMCISION      at 26 y/o    HX HERNIA REPAIR  as a youth    right side    HX SHOULDER ARTHROSCOPY      left x 3 for torn rotator cuff muscle     Current Outpatient Prescriptions on File Prior to Visit   Medication Sig Dispense Refill    terazosin (HYTRIN) 1 mg capsule TAKE ONE CAPSULE BY MOUTH ONCE NIGHTLY 30 Cap 2    montelukast (SINGULAIR) 10 mg tablet Take 1 Tab by mouth daily. 90 Tab 3    albuterol (PROVENTIL VENTOLIN) 2.5 mg /3 mL (0.083 %) nebulizer solution 3 mL by Nebulization route every four (4) hours as needed for Wheezing.  180 Each 5    ipratropium (ATROVENT) 0.02 % soln 2.5 mL by Nebulization route four (4) times daily as needed. 180 mL 5    nystatin (MYCOSTATIN) 100,000 unit/mL suspension Take 5 mL by mouth four (4) times daily. swish and swallow 473 mL 0    cyclobenzaprine (FLEXERIL) 10 mg tablet TAKE ONE TABLET BY MOUTH THREE TIMES A DAY AS NEEDED FOR MUSCLE SPASMS 30 Tab 1    metoprolol tartrate (LOPRESSOR) 50 mg tablet TAKE ONE TABLET BY MOUTH TWICE A  Tab 1    VIAGRA 100 mg tablet Take 1 Tab by mouth as needed. 30 Tab 3    metFORMIN ER (GLUCOPHAGE XR) 500 mg tablet TAKE ONE TABLET BY MOUTH DAILY 90 Tab 1    lovastatin (MEVACOR) 20 mg tablet Take 1 Tab by mouth nightly. 90 Tab 1    levothyroxine (SYNTHROID) 88 mcg tablet TAKE ONE TABLET BY MOUTH EVERY MORNING BEFORE BREAKFAST 90 Tab 3    losartan-hydroCHLOROthiazide (HYZAAR) 100-25 mg per tablet Take 1 Tab by mouth daily. 90 Tab 3    fluticasone-salmeterol (ADVAIR DISKUS) 500-50 mcg/dose diskus inhaler Take 1 Puff by inhalation every twelve (12) hours. No current facility-administered medications on file prior to visit. Objective:     Vitals:    03/28/18 0908   BP: 122/61   Temp: 97.8 °F (36.6 °C)   TempSrc: Oral   SpO2: 93%   Weight: 242 lb (109.8 kg)       Physical Examination:  General appearance - alert, well appearing, and in no distress  Eyes -sclera anicteric  Neck - supple, no significant adenopathy, no thyromegaly  Chest - clear to auscultation, no wheezes, rales or rhonchi, symmetric air entry  Heart - normal rate, regular rhythm, normal S1, S2, no murmurs, rubs, clicks or gallops  Neurological - alert, oriented, no focal findings or movement disorder noted    T spine assessment:  Pt points to T6-T8 as sight of pain. Disc alignment WNL,   Noted bilateral muscle spasms. ROM WNL. Assessment/ Plan:   Diagnoses and all orders for this visit:    1.  Type 2 diabetes mellitus with nephropathy (HCC)  -     AMB POC GLUCOSE BLOOD, BY GLUCOSE MONITORING DEVICE  -     HEMOGLOBIN A1C WITH EAG; Future  -     METABOLIC PANEL, COMPREHENSIVE; Future    2. Back spasm  -     methocarbamol (ROBAXIN) 500 mg tablet; Take 1 Tab by mouth three (3) times daily as needed. Repeat A1C and CMP today. PRN Robaxin and APAP for LBP. Also recommended Pt attempt relief with OTC lidocaine 4% patch, 12 hours on 12 hrs off. [facility]-assess response[facility]improvement [facility] prior to ordering films. Change metformin dose PRN r/t A1C level. RTC in 6 weeks. Spent > 15 min in direct Pt edu/counseling. I have discussed the diagnosis with the patient and the intended plan as seen in the above orders. The patient has received an after-visit summary and questions were answered concerning future plans. I have discussed medication side effects and warnings with the patient as well. The patient verbalizes understanding and agreement with the plan. Follow-up Disposition:  Return in about 6 weeks (around 5/9/2018).

## 2018-03-28 NOTE — MR AVS SNAPSHOT
[address] [place] [address] [address] [id number] 
[phone] Patient: Radha Cadet MRN: [id number] SMT:[birth year] Visit Information Date & Time Provider Department Dept. Phone Encounter #  
 3/28/2018  9:10 AM Tolu Bennett [facility] [phone] [id number] Follow-up Instructions Return in about 6 weeks (around 5/9/2018). Upcoming Health Maintenance Date Due COLONOSCOPY 2/19/1969 DTaP/Tdap/Td series (1 - Tdap) 2/19/1972 ZOSTER VACCINE AGE 60> 12/19/2010 EYE EXAM RETINAL OR DILATED Q1 7/1/2014 GLAUCOMA SCREENING Q2Y 2/19/2016 FOOT EXAM Q1 10/13/2016 HEMOGLOBIN A1C Q6M 12/21/2017 MEDICARE YEARLY EXAM 3/14/2018 MICROALBUMIN Q1 6/21/2018 LIPID PANEL Q1 10/25/2018 Allergies as of 3/28/2018  Review Complete On: 3/28/2018 By: Jessy Deras NP Severity Noted Reaction Type Reactions Bactrim [Sulfamethoprim Ds]  12/03/2015    Diarrhea, Nausea and Vomiting After on 1st dose. Morphine  08/15/2011    Unknown (comments) Current Immunizations  Reviewed on 10/25/2017 Name Date Influenza Vaccine 10/22/2014, 1/24/2014, 10/1/2012 Influenza Vaccine (Quad) PF 10/25/2017, 12/28/2016, 10/22/2015 Pneumococcal Polysaccharide (PPSV-23) 12/28/2016 Pneumococcal Vaccine (Unspecified Type) 1/1/2010 Not reviewed this visit You Were Diagnosed With   
  
 Codes Comments Type 2 diabetes mellitus with nephropathy ([facility]    -  Primary ICD-10-CM: E11.21 
ICD-9-CM: 250.40, 583.81 Back spasm     ICD-10-CM: O47.816 ICD-9-CM: 724.8 Vitals BP Temp Weight(growth percentile) SpO2 BMI Smoking Status 122/61 (BP 1 Location: Left arm, BP Patient Position: Sitting) 97.8 °F (36.6 °C) (Oral) 242 lb (109.8 kg) 93% 35.72 kg/m2 Former Smoker Vitals History BMI and BSA Data Body Mass Index Body Surface Area  35.72 kg/m 2 2.31 m 2  
  
  
 Preferred Pharmacy Pharmacy Name Phone [facility] [address] , [address] [facility] [phone] Your Updated Medication List  
  
   
This list is accurate as of 3/28/18  9:48 AM.  Always use your most recent med list.  
  
  
  
  
 ADVAIR DISKUS 500-50 mcg/dose diskus inhaler Generic drug:  fluticasone-salmeterol Take 1 Puff by inhalation every twelve (12) hours. albuterol 2.5 mg /3 mL (0.083 %) nebulizer solution Commonly known as:  PROVENTIL VENTOLIN  
3 mL by Nebulization route every four (4) hours as needed for Wheezing. ipratropium 0.02 % Soln Commonly known as:  ATROVENT  
2.5 mL by Nebulization route four (4) times daily as needed. levothyroxine 88 mcg tablet Commonly known as:  SYNTHROID  
TAKE ONE TABLET BY MOUTH EVERY MORNING BEFORE BREAKFAST  
  
 losartan-hydroCHLOROthiazide 100-25 mg per tablet Commonly known as:  HYZAAR Take 1 Tab by mouth daily. lovastatin 20 mg tablet Commonly known as:  MEVACOR Take 1 Tab by mouth nightly. metFORMIN  mg tablet Commonly known as:  GLUCOPHAGE XR  
TAKE ONE TABLET BY MOUTH DAILY  
  
 methocarbamol 500 mg tablet Commonly known as:  ROBAXIN Take 1 Tab by mouth three (3) times daily as needed. metoprolol tartrate 50 mg tablet Commonly known as:  LOPRESSOR  
TAKE ONE TABLET BY MOUTH TWICE A DAY  
  
 montelukast 10 mg tablet Commonly known as:  SINGULAIR Take 1 Tab by mouth daily. nystatin 100,000 unit/mL suspension Commonly known as:  MYCOSTATIN Take 5 mL by mouth four (4) times daily. swish and swallow  
  
 terazosin 1 mg capsule Commonly known as:  HYTRIN  
TAKE ONE CAPSULE BY MOUTH ONCE NIGHTLY VIAGRA 100 mg tablet Generic drug:  sildenafil citrate Take 1 Tab by mouth as needed. Prescriptions Sent to Pharmacy  Refills  
 methocarbamol (ROBAXIN) 500 mg tablet 0  
 Sig: Take 1 Tab by mouth three (3) times daily as needed. Class: Normal  
 Pharmacy: [email or web address], [address] [place] [address] #: [phone] Route: Oral  
  
We Performed the Following AMB POC GLUCOSE BLOOD, BY GLUCOSE MONITORING DEVICE [09922 CPT(R)] Follow-up Instructions Return in about 6 weeks (around 5/9/2018). To-Do List   
 03/28/2018 Lab:  HEMOGLOBIN A1C WITH EAG   
  
 03/28/2018 Lab:  METABOLIC PANEL, COMPREHENSIVE Patient Instructions Back Pain: Care Instructions Your Care Instructions Back pain has many possible causes. It is often related to problems with muscles and ligaments of the back. It may also be related to problems with the nerves, discs, or bones of the back. Moving, lifting, standing, sitting, or sleeping in an awkward way can strain the back. Sometimes you don't notice the injury until later. Arthritis is another common cause of back pain. Although it may hurt a lot, back pain usually improves on its own within several weeks. Most people recover in 12 weeks or less. Using good home treatment and being careful not to stress your back can help you feel better sooner. Follow-up care is a key part of your treatment and safety. Be sure to make and go to all appointments, and call your doctor if you are having problems. It's also a good idea to know your test results and keep a list of the medicines you take. How can you care for yourself at home? · Sit or lie in positions that are most comfortable and reduce your pain. Try one of these positions when you lie down: ¨ Lie on your back with your knees bent and supported by large pillows. ¨ Lie on the floor with your legs on the seat of a sofa or chair. Thai Bryce on your side with your knees and hips bent and a pillow between your legs. ¨ Lie on your stomach if it does not make pain worse. · Do not sit up in bed, and avoid soft couches and twisted positions.  Bed rest can help relieve pain at first, but it delays healing. Avoid bed rest after the first day of back pain. · Change positions every 30 minutes. If you must sit for long periods of time, take breaks from sitting. Get up and walk around, or lie in a comfortable position. · Try using a heating pad on a low or medium setting for 15 to 20 minutes every 2 or 3 hours. Try a warm shower in place of one session with the heating pad. · You can also try an ice pack for 10 to 15 minutes every 2 to 3 hours. Put a thin cloth between the ice pack and your skin. · Take pain medicines exactly as directed. ¨ If the doctor gave you a prescription medicine for pain, take it as prescribed. ¨ If you are not taking a prescription pain medicine, ask your doctor if you can take an over-the-counter medicine. · Take short walks several times a day. You can start with 5 to 10 minutes, 3 or 4 times a day, and work up to longer walks. Walk on level surfaces and avoid hills and stairs until your back is better. · Return to work and other activities as soon as you can. Continued rest without activity is usually not good for your back. · To prevent future back pain, do exercises to stretch and strengthen your back and stomach. Learn how to use good posture, safe lifting techniques, and proper body mechanics. When should you call for help? Call your doctor now or seek immediate medical care if: 
? · You have new or worsening numbness in your legs. ? · You have new or worsening weakness in your legs. (This could make it hard to stand up.) ? · You lose control of your bladder or bowels. ? Watch closely for changes in your health, and be sure to contact your doctor if: 
? · Your pain gets worse. ? · You are not getting better after 2 weeks. Where can you learn more? Go to http://buffy-christiano.info/. Enter K157 in the search box to learn more about \"Back Pain: Care Instructions. \" Current as of: March 21, 2017 Content Version: 11.4 © 3568-5852 Healthwise, [employer]. Care instructions adapted under license by [employer] (which disclaims liability or warranty for this information). If you have questions about a medical condition or this instruction, always ask your healthcare professional. [address] any warranty or liability for your use of this information. Introducing [facility]! Janette Hay introduces [employer] patient portal. Now you can access parts of your medical record, email your doctor's office, and request medication refills online. 1. In your internet browser, go to [email or web address] 2. Click on the First Time User? Click Here link in the Sign In box. You will see the New Member Sign Up page. 3. Enter your [employer] Access Code exactly as it appears below. You will not need to use this code after youve completed the sign-up process. If you do not sign up before the expiration date, you must request a new code. · [employer] Access Code: [email or web address] Expires: 4/26/2018 12:35 PM 
 
4. Enter the last four digits of your Social Security Number (xxxx) and Date of Birth (mm/dd/yyyy) as indicated and click Submit. You will be taken to the next sign-up page. 5. Create a [employer] ID. This will be your [employer] login ID and cannot be changed, so think of one that is secure and easy to remember. 6. Create a [employer] password. You can change your password at any time. 7. Enter your Password Reset Question and Answer. This can be used at a later time if you forget your password. 8. Enter your e-mail address. You will receive e-mail notification when new information is available in [address] 9. Click Sign Up. You can now view and download portions of your medical record. 10. Click the Download Summary menu link to download a portable copy of your medical information. If you have questions, please visit the Frequently Asked Questions section of the [employer] website. Remember, [employer] is NOT to be used for urgent needs. For medical emergencies, dial 911. Now available from your iPhone and Android! Please provide this summary of care documentation to your next provider. Your primary care clinician is listed as Clotilde Arevalo. If you have any questions after today's visit, please call [phone].

## 2018-03-28 NOTE — PROGRESS NOTES
Results for orders placed or performed in visit on 03/28/18   AMB POC GLUCOSE BLOOD, BY GLUCOSE MONITORING DEVICE   Result Value Ref Range    Glucose  non fasting mg/dL     Coordination of Care  1. Have you been to the ER, urgent care clinic since your last visit? Hospitalized since your last visit? No    2. Have you seen or consulted any other health care providers outside of the [address] [place] [address] since your last visit? Include any pap smears or colon screening. No    Does the patient need refills? NO    Learning Assessment Complete?  yes

## 2018-03-28 NOTE — PATIENT INSTRUCTIONS

## 2018-04-02 ENCOUNTER — TELEPHONE (OUTPATIENT)
Dept: FAMILY MEDICINE CLINIC | Age: 68
End: 2018-04-02

## 2018-04-02 DIAGNOSIS — E87.6 HYPOKALEMIA: Primary | ICD-10-CM

## 2018-04-02 RX ORDER — LOSARTAN POTASSIUM 100 MG/1
100 TABLET ORAL DAILY
Qty: 30 TAB | Refills: 3 | Status: SHIPPED | OUTPATIENT
Start: 2018-04-02 | End: 2018-08-02 | Stop reason: SDUPTHER

## 2018-04-02 RX ORDER — POTASSIUM CHLORIDE 20 MEQ/1
20 TABLET, EXTENDED RELEASE ORAL 2 TIMES DAILY
Qty: 10 TAB | Refills: 0 | Status: SHIPPED | OUTPATIENT
Start: 2018-04-02 | End: 2018-04-07

## 2018-04-02 NOTE — TELEPHONE ENCOUNTER
T/C to Pt re: labs showing low K+. Reports no S&S of hypokalemia, but recommend he stop HCTZ and just use plain losartan 100mg, which I will send to pharmacy and take PO KCL replacement x 5 days. Repeat BMP in 2-3 weeks. Pt expressed understanding.

## 2018-04-23 ENCOUNTER — CLINICAL SUPPORT (OUTPATIENT)
Dept: FAMILY MEDICINE CLINIC | Age: 68
End: 2018-04-23

## 2018-04-23 DIAGNOSIS — E87.6 HYPOKALEMIA: ICD-10-CM

## 2018-04-23 NOTE — PROGRESS NOTES
Patient came in today for labs. BMP was drawn today on the left arm with no complications. Patient was told he would get a call with results in about 2 weeks.  Jana Hassan MA.

## 2018-04-24 LAB
BUN SERPL-MCNC: 12 MG/DL (ref 8–27)
BUN/CREAT SERPL: 11 (ref 10–24)
CALCIUM SERPL-MCNC: 8.9 MG/DL (ref 8.6–10.2)
CHLORIDE SERPL-SCNC: 104 MMOL/L (ref 96–106)
CO2 SERPL-SCNC: 20 MMOL/L (ref 18–29)
CREAT SERPL-MCNC: 1.1 MG/DL (ref 0.76–1.27)
GFR SERPLBLD CREATININE-BSD FMLA CKD-EPI: 69 ML/MIN/1.73
GFR SERPLBLD CREATININE-BSD FMLA CKD-EPI: 80 ML/MIN/1.73
GLUCOSE SERPL-MCNC: 135 MG/DL (ref 65–99)
POTASSIUM SERPL-SCNC: 3.6 MMOL/L (ref 3.5–5.2)
SODIUM SERPL-SCNC: 138 MMOL/L (ref 134–144)

## 2018-04-24 NOTE — PROGRESS NOTES
Pls let Pt know his K improved, cont POC w/ Losartan and encourage Pt to check BPs at home. Discuss readings at [facility] or if consistently > 233A systolic, request Pt call before NOV w/ high readings.    Thank you,  Yaya Rodriguez

## 2018-04-25 NOTE — PROGRESS NOTES
Left a generic message for patient today asking the patient to call back to speak with a nurse for message from provider.  Demario Thakur RN

## 2018-04-25 NOTE — PROGRESS NOTES
Pt called and I gave him message from provider. I reminded him of May 8th appointment for follow up.  Brandi Ruiz RN

## 2018-05-08 ENCOUNTER — HOSPITAL ENCOUNTER (OUTPATIENT)
Dept: LAB | Age: 68
Discharge: HOME | End: 2018-05-08

## 2018-05-08 ENCOUNTER — OFFICE VISIT (OUTPATIENT)
Dept: FAMILY MEDICINE CLINIC | Age: 68
End: 2018-05-08

## 2018-05-08 VITALS
HEART RATE: 79 BPM | TEMPERATURE: 97.6 F | BODY MASS INDEX: 36.29 KG/M2 | SYSTOLIC BLOOD PRESSURE: 127 MMHG | HEIGHT: 69 IN | DIASTOLIC BLOOD PRESSURE: 62 MMHG | WEIGHT: 245 LBS

## 2018-05-08 DIAGNOSIS — N40.1 BENIGN PROSTATIC HYPERPLASIA WITH URINARY FREQUENCY: ICD-10-CM

## 2018-05-08 DIAGNOSIS — G89.29 CHRONIC LEFT SHOULDER PAIN: ICD-10-CM

## 2018-05-08 DIAGNOSIS — E11.21 TYPE 2 DIABETES MELLITUS WITH NEPHROPATHY (HCC): Primary | ICD-10-CM

## 2018-05-08 DIAGNOSIS — K74.60 CIRRHOSIS OF LIVER WITHOUT ASCITES, UNSPECIFIED HEPATIC CIRRHOSIS TYPE (HCC): ICD-10-CM

## 2018-05-08 DIAGNOSIS — R35.0 BENIGN PROSTATIC HYPERPLASIA WITH URINARY FREQUENCY: ICD-10-CM

## 2018-05-08 DIAGNOSIS — I10 ESSENTIAL HYPERTENSION: ICD-10-CM

## 2018-05-08 DIAGNOSIS — M25.512 CHRONIC LEFT SHOULDER PAIN: ICD-10-CM

## 2018-05-08 DIAGNOSIS — E03.9 ACQUIRED HYPOTHYROIDISM: ICD-10-CM

## 2018-05-08 PROBLEM — E66.01 SEVERE OBESITY (BMI 35.0-39.9) WITH COMORBIDITY (HCC): Status: ACTIVE | Noted: 2018-05-08

## 2018-05-08 LAB
ANION GAP SERPL CALC-SCNC: 8 MMOL/L (ref 5–15)
BASOPHILS # BLD: 0 K/UL (ref 0–0.1)
BASOPHILS NFR BLD: 1 % (ref 0–1)
BUN SERPL-MCNC: 14 MG/DL (ref 6–20)
BUN/CREAT SERPL: 13 (ref 12–20)
CALCIUM SERPL-MCNC: 8.7 MG/DL (ref 8.5–10.1)
CHLORIDE SERPL-SCNC: 109 MMOL/L (ref 97–108)
CO2 SERPL-SCNC: 26 MMOL/L (ref 21–32)
CREAT SERPL-MCNC: 1.1 MG/DL (ref 0.7–1.3)
DIFFERENTIAL METHOD BLD: ABNORMAL
EOSINOPHIL # BLD: 0.1 K/UL (ref 0–0.4)
EOSINOPHIL NFR BLD: 2 % (ref 0–7)
ERYTHROCYTE [DISTWIDTH] IN BLOOD BY AUTOMATED COUNT: 13.2 % (ref 11.5–14.5)
GLUCOSE POC: NORMAL MG/DL
GLUCOSE SERPL-MCNC: 86 MG/DL (ref 65–100)
HCT VFR BLD AUTO: 38.4 % (ref 36.6–50.3)
HGB BLD-MCNC: 12.6 G/DL (ref 12.1–17)
IMM GRANULOCYTES # BLD: 0 K/UL (ref 0–0.04)
IMM GRANULOCYTES NFR BLD AUTO: 0 % (ref 0–0.5)
LYMPHOCYTES # BLD: 0.9 K/UL (ref 0.8–3.5)
LYMPHOCYTES NFR BLD: 23 % (ref 12–49)
MCH RBC QN AUTO: 34.7 PG (ref 26–34)
MCHC RBC AUTO-ENTMCNC: 32.8 G/DL (ref 30–36.5)
MCV RBC AUTO: 105.8 FL (ref 80–99)
MONOCYTES # BLD: 0.4 K/UL (ref 0–1)
MONOCYTES NFR BLD: 10 % (ref 5–13)
NEUTS SEG # BLD: 2.7 K/UL (ref 1.8–8)
NEUTS SEG NFR BLD: 65 % (ref 32–75)
NRBC # BLD: 0 K/UL (ref 0–0.01)
NRBC BLD-RTO: 0 PER 100 WBC
PLATELET # BLD AUTO: 83 K/UL (ref 150–400)
PMV BLD AUTO: 11.1 FL (ref 8.9–12.9)
POTASSIUM SERPL-SCNC: 3.6 MMOL/L (ref 3.5–5.1)
RBC # BLD AUTO: 3.63 M/UL (ref 4.1–5.7)
SODIUM SERPL-SCNC: 143 MMOL/L (ref 136–145)
TSH SERPL DL<=0.05 MIU/L-ACNC: 1.67 UIU/ML (ref 0.36–3.74)
WBC # BLD AUTO: 4.1 K/UL (ref 4.1–11.1)

## 2018-05-08 PROCEDURE — 80048 BASIC METABOLIC PNL TOTAL CA: CPT | Performed by: NURSE PRACTITIONER

## 2018-05-08 PROCEDURE — 84443 ASSAY THYROID STIM HORMONE: CPT | Performed by: NURSE PRACTITIONER

## 2018-05-08 PROCEDURE — 85025 COMPLETE CBC W/AUTO DIFF WBC: CPT | Performed by: NURSE PRACTITIONER

## 2018-05-08 RX ORDER — METOPROLOL TARTRATE 50 MG/1
TABLET ORAL
Qty: 180 TAB | Refills: 1 | Status: SHIPPED | OUTPATIENT
Start: 2018-05-08 | End: 2018-09-10 | Stop reason: SDUPTHER

## 2018-05-08 RX ORDER — PREDNISONE 20 MG/1
TABLET ORAL
Qty: 20 TAB | Refills: 0 | Status: SHIPPED | OUTPATIENT
Start: 2018-05-08 | End: 2018-07-12 | Stop reason: ALTCHOICE

## 2018-05-08 RX ORDER — TERAZOSIN 1 MG/1
CAPSULE ORAL
Qty: 30 CAP | Refills: 5 | Status: SHIPPED | OUTPATIENT
Start: 2018-05-08 | End: 2019-02-18 | Stop reason: SDUPTHER

## 2018-05-08 NOTE — PROGRESS NOTES
Subjective:     Chief Complaint   Patient presents with    Follow-up    Back Pain        He  is a 80 y.o. male who presents for evaluation of HTN and chronic LBP. Since LOV, Pt reports continued mid line thoracic/sub scapular back pain. Has previously noted some transferring from side/side (L to R)     Pain is worse w/ position changes. No radiation up/down spine nor into flanks. Poor response to PRN robaxin. Cont to tolerate Losartan 100mg w/o HCTZ. Does not check BP at home. Is requesting refills on routine Rx. ROS  Gen - no fever/chills  Resp - no dyspnea or cough  CV - no chest pain or PERRY  Rest per HPI    Past Medical History:   Diagnosis Date    Blurred vision     Cirrhosis, alcoholic (HCC)     COPD bronchitis     Diabetes mellitus ([address]     Ear discomfort     History of ETOH abuse     HTN (hypertension)     SOB (shortness of breath)     Thrombocytopenia ([address]     Trouble in sleeping      Past Surgical History:   Procedure Laterality Date    HX CATARACT REMOVAL Right 2007    HX CATARACT REMOVAL Left 2012    HX CIRCUMCISION      at 24 y/o    HX HERNIA REPAIR  as a youth    right side    HX SHOULDER ARTHROSCOPY      left x 3 for torn rotator cuff muscle     Current Outpatient Prescriptions on File Prior to Visit   Medication Sig Dispense Refill    losartan (COZAAR) 100 mg tablet Take 1 Tab by mouth daily. 30 Tab 3    methocarbamol (ROBAXIN) 500 mg tablet Take 1 Tab by mouth three (3) times daily as needed. 30 Tab 0    terazosin (HYTRIN) 1 mg capsule TAKE ONE CAPSULE BY MOUTH ONCE NIGHTLY 30 Cap 2    montelukast (SINGULAIR) 10 mg tablet Take 1 Tab by mouth daily. 90 Tab 3    albuterol (PROVENTIL VENTOLIN) 2.5 mg /3 mL (0.083 %) nebulizer solution 3 mL by Nebulization route every four (4) hours as needed for Wheezing. 180 Each 5    ipratropium (ATROVENT) 0.02 % soln 2.5 mL by Nebulization route four (4) times daily as needed.  180 mL 5    nystatin (MYCOSTATIN) 100,000 unit/mL suspension Take 5 mL by mouth four (4) times daily. swish and swallow 473 mL 0    metoprolol tartrate (LOPRESSOR) 50 mg tablet TAKE ONE TABLET BY MOUTH TWICE A  Tab 1    VIAGRA 100 mg tablet Take 1 Tab by mouth as needed. 30 Tab 3    metFORMIN ER (GLUCOPHAGE XR) 500 mg tablet TAKE ONE TABLET BY MOUTH DAILY 90 Tab 1    lovastatin (MEVACOR) 20 mg tablet Take 1 Tab by mouth nightly. 90 Tab 1    levothyroxine (SYNTHROID) 88 mcg tablet TAKE ONE TABLET BY MOUTH EVERY MORNING BEFORE BREAKFAST 90 Tab 3    fluticasone-salmeterol (ADVAIR DISKUS) 500-50 mcg/dose diskus inhaler Take 1 Puff by inhalation every twelve (12) hours. No current facility-administered medications on file prior to visit. Objective:     Vitals:    05/08/18 0850   BP: 127/62   Pulse: 79   Temp: 97.6 °F (36.4 °C)   TempSrc: Oral   Weight: 245 lb (111.1 kg)   Height: 5' 9\" (1.753 m)       Physical Examination:  General appearance - alert, well appearing, and in no distress  Eyes -sclera anicteric  Neck - supple, no significant adenopathy, no thyromegaly  Chest - clear to auscultation, no wheezes, rales or rhonchi, symmetric air entry  Heart - normal rate, regular rhythm, normal S1, S2, no murmurs, rubs, clicks or gallops  Neurological - alert, oriented, no focal findings or movement disorder noted    L subscapular tenderness, notable w/ shoulder rotation, T spine non-tender       Assessment/ Plan:   Diagnoses and all orders for this visit:    1. Type 2 diabetes mellitus with nephropathy (HCC)  -     AMB POC GLUCOSE BLOOD, BY GLUCOSE MONITORING DEVICE    2. Benign prostatic hyperplasia with urinary frequency  -     terazosin (HYTRIN) 1 mg capsule; TAKE ONE CAPSULE BY MOUTH ONCE NIGHTLY    3. Chronic left shoulder pain  -     predniSONE (DELTASONE) 20 mg tablet; 40mg PO BID x 5 days    4.  Essential hypertension  -     METABOLIC PANEL, BASIC; Future  -     metoprolol tartrate (LOPRESSOR) 50 mg tablet; TAKE ONE TABLET BY MOUTH TWICE A DAY    5. Cirrhosis of liver without ascites, unspecified hepatic cirrhosis type ([facility]  -     CBC WITH AUTOMATED DIFF; Future    6. Acquired hypothyroidism  -     TSH 3RD GENERATION; Future       Given Pt's concerns r/t cost of specialist/plains films, will Tx L shoulder pain w/ prednisone taper. Refer to inhouse chiropractor for further eval.     Repeat labs to confirm K+ WNL r/t ARB monotherapy. Discussed ACP/HC program and Pt declined interest at this time. RTC in 6-8 weeks. I have discussed the diagnosis with the patient and the intended plan as seen in the above orders. The patient has received an after-visit summary and questions were answered concerning future plans. I have discussed medication side effects and warnings with the patient as well. The patient verbalizes understanding and agreement with the plan.     Follow-up Disposition: Not on File

## 2018-05-08 NOTE — PATIENT INSTRUCTIONS
Shoulder Bursitis: Exercises  Your Care Instructions  Here are some examples of typical rehabilitation exercises for your condition. Start each exercise slowly. Ease off the exercise if you start to have pain. Your doctor or physical therapist will tell you when you can start these exercises and which ones will work best for you. How to do the exercises  Posterior stretching exercise    1. Hold the elbow of your injured arm with your other hand. 2. Use your hand to pull your injured arm gently up and across your body. You will feel a gentle stretch across the back of your injured shoulder. 3. Hold for at least 15 to 30 seconds. Then slowly lower your arm. 4. Repeat 2 to 4 times. Up-the-back stretch    Your doctor or physical therapist may want you to wait to do this stretch until you have regained most of your range of motion and strength. You can do this stretch in different ways. Hold any of these stretches for at least 15 to 30 seconds. Repeat them 2 to 4 times. 1. Put your hand in your back pocket. Let it rest there to stretch your shoulder. 2. With your other hand, hold your injured arm (palm outward) behind your back by the wrist. Pull your arm up gently to stretch your shoulder. 3. Next, put a towel over your other shoulder. Put the hand of your injured arm behind your back. Now hold the back end of the towel. With the other hand, hold the front end of the towel in front of your body. Pull gently on the front end of the towel. This will bring your hand farther up your back to stretch your shoulder. Overhead stretch    1. Standing about an arm's length away, grasp onto a solid surface. You could use a countertop, a doorknob, or the back of a sturdy chair. 2. With your knees slightly bent, bend forward with your arms straight. Lower your upper body, and let your shoulders stretch. 3. As your shoulders are able to stretch farther, you may need to take a step or two backward.   4. Hold for at least 15 to 30 seconds. Then stand up and relax. If you had stepped back during your stretch, step forward so you can keep your hands on the solid surface. 5. Repeat 2 to 4 times. Shoulder flexion (lying down)    To make a wand for this exercise, use a piece of PVC pipe or a broom handle with the broom removed. Make the wand about a foot wider than your shoulders. 1. Lie on your back, holding a wand with both hands. Your palms should face down as you hold the wand. 2. Keeping your elbows straight, slowly raise your arms over your head. Raise them until you feel a stretch in your shoulders, upper back, and chest.  3. Hold for 15 to 30 seconds. 4. Repeat 2 to 4 times. Shoulder rotation (lying down)    To make a wand for this exercise, use a piece of PVC pipe or a broom handle with the broom removed. Make the wand about a foot wider than your shoulders. 1. Lie on your back. Hold a wand with both hands with your elbows bent and palms up. 2. Keep your elbows close to your body, and move the wand across your body toward the sore arm. 3. Hold for 8 to 12 seconds. 4. Repeat 2 to 4 times. Shoulder blade squeeze    1. Stand with your arms at your sides, and squeeze your shoulder blades together. Do not raise your shoulders up as you squeeze. 2. Hold 6 seconds. 3. Repeat 8 to 12 times. Shoulder flexor and extensor exercise    These are isometric exercises. That means you contract your muscles without actually moving. 1. Push forward (flex): Stand facing a wall or doorjamb, about 6 inches or less back. Hold your injured arm against your body. Make a closed fist with your thumb on top. Then gently push your hand forward into the wall with about 25% to 50% of your strength. Don't let your body move backward as you push. Hold for about 6 seconds. Relax for a few seconds. Repeat 8 to 12 times. 2. Push backward (extend): Stand with your back flat against a wall.  Your upper arm should be against the wall, with your elbow bent 90 degrees (your hand straight ahead). Push your elbow gently back against the wall with about 25% to 50% of your strength. Don't let your body move forward as you push. Hold for about 6 seconds. Relax for a few seconds. Repeat 8 to 12 times. Scapular exercise: Wall push-ups    This exercise is best done with your fingers somewhat turned out, rather than straight up and down. 1. Stand facing a wall, about 12 inches to 18 inches away. 2. Place your hands on the wall at shoulder height. 3. Slowly bend your elbows and bring your face to the wall. Keep your back and hips straight. 4. Push back to where you started. 5. Repeat 8 to 12 times. 6. When you can do this exercise against a wall comfortably, you can try it against a counter. You can then slowly progress to the end of a couch, then to a sturdy chair, and finally to the floor. Scapular exercise: Retraction    For this exercise, you will need elastic exercise material, such as surgical tubing or Thera-Band. 1. Put the band around a solid object at about waist level. (A bedpost will work well.) Each hand should hold an end of the band. 2. With your elbows at your sides and bent to 90 degrees, pull the band back. Your shoulder blades should move toward each other. Then move your arms back where you started. 3. Repeat 8 to 12 times. 4. If you have good range of motion in your shoulders, try this exercise with your arms lifted out to the sides. Keep your elbows at a 90-degree angle. Raise the elastic band up to about shoulder level. Pull the band back to move your shoulder blades toward each other. Then move your arms back where you started. Internal rotator strengthening exercise    1. Start by tying a piece of elastic exercise material to a doorknob. You can use surgical tubing or Thera-Band. 2. Stand or sit with your shoulder relaxed and your elbow bent 90 degrees. Your upper arm should rest comfortably against your side.  Squeeze a rolled towel between your elbow and your body for comfort. This will help keep your arm at your side. 3. Hold one end of the elastic band in the hand of the painful arm. 4. Slowly rotate your forearm toward your body until it touches your belly. Slowly move it back to where you started. 5. Keep your elbow and upper arm firmly tucked against the towel roll or at your side. 6. Repeat 8 to 12 times. External rotator strengthening exercise    1. Start by tying a piece of elastic exercise material to a doorknob. You can use surgical tubing or Thera-Band. (You may also hold one end of the band in each hand.)  2. Stand or sit with your shoulder relaxed and your elbow bent 90 degrees. Your upper arm should rest comfortably against your side. Squeeze a rolled towel between your elbow and your body for comfort. This will help keep your arm at your side. 3. Hold one end of the elastic band with the hand of the painful arm. 4. Start with your forearm across your belly. Slowly rotate the forearm out away from your body. Keep your elbow and upper arm tucked against the towel roll or the side of your body until you begin to feel tightness in your shoulder. Slowly move your arm back to where you started. 5. Repeat 8 to 12 times. Follow-up care is a key part of your treatment and safety. Be sure to make and go to all appointments, and call your doctor if you are having problems. It's also a good idea to know your test results and keep a list of the medicines you take. Where can you learn more? Go to http://buffy-christiano.info/. Enter J599 in the search box to learn more about \"Shoulder Bursitis: Exercises. \"  Current as of: March 21, 2017  Content Version: 11.4  © 4863-6815 [employer]. Care instructions adapted under license by [employer] (which disclaims liability or warranty for this information).  If you have questions about a medical condition or this instruction, always ask your healthcare professional. [address] any warranty or liability for your use of this information.

## 2018-05-08 NOTE — MR AVS SNAPSHOT
[address] [place] [address] [address] [id number] 
[phone] Patient: Quinn Davidson MRN: [id number] Visit Information Date & Time Provider Department Dept. Phone Encounter #  
 5/8/2018  8:50 AM Meenakshi Anguiano, Shilpa Patricia Du [facility] [phone] [id number] Upcoming Health Maintenance Date Due COLONOSCOPY 2/19/1969 DTaP/Tdap/Td series (1 - Tdap) 2/19/1972 ZOSTER VACCINE AGE 60> 12/19/2010 EYE EXAM RETINAL OR DILATED Q1 7/1/2014 GLAUCOMA SCREENING Q2Y 2/19/2016 FOOT EXAM Q1 10/13/2016 MEDICARE YEARLY EXAM 3/14/2018 MICROALBUMIN Q1 6/21/2018 Influenza Age 5 to Adult 8/1/2018 HEMOGLOBIN A1C Q6M 9/28/2018 LIPID PANEL Q1 10/25/2018 Allergies as of 5/8/2018  Review Complete On: 5/8/2018 By: Meenakshi Anguiano NP Severity Noted Reaction Type Reactions Bactrim [Sulfamethoprim Ds]  12/03/2015    Diarrhea, Nausea and Vomiting After on 1st dose. Morphine  08/15/2011    Unknown (comments) Current Immunizations  Reviewed on 10/25/2017 Name Date Influenza Vaccine 10/22/2014, 1/24/2014, 10/1/2012 Influenza Vaccine (Quad) PF 10/25/2017, 12/28/2016, 10/22/2015 Pneumococcal Polysaccharide (PPSV-23) 12/28/2016 Pneumococcal Vaccine (Unspecified Type) 1/1/2010 Not reviewed this visit You Were Diagnosed With   
  
 Codes Comments Type 2 diabetes mellitus with nephropathy ([facility] [address]    -  Primary ICD-10-CM: E11.21 
ICD-9-CM: 250.40, 583.81 Benign prostatic hyperplasia with urinary frequency     ICD-10-CM: N40.1, R35.0 ICD-9-CM: 600.01, 788.41 Chronic left shoulder pain     ICD-10-CM: M25.512, G89.29 ICD-9-CM: 719.41, 338.29 Essential hypertension     ICD-10-CM: I10 
ICD-9-CM: 401.9 Cirrhosis of liver without ascites, unspecified hepatic cirrhosis type ([address]     ICD-10-CM: K74.60 ICD-9-CM: 571.5 Acquired hypothyroidism     ICD-10-CM: E03.9 ICD-9-CM: 946. 9 Vitals BP Pulse Temp Height(growth percentile) Weight(growth percentile) BMI  
 127/62 (BP 1 Location: Left arm, BP Patient Position: Sitting) 79 97.6 °F (36.4 °C) (Oral) 5' 9\" (1.753 m) 245 lb (111.1 kg) 36.18 kg/m2 Smoking Status Former Smoker Vitals History BMI and BSA Data Body Mass Index Body Surface Area  
 36.18 kg/m 2 2.33 m 2 Preferred Pharmacy Pharmacy Name Phone [facility] [address] , [address] [facility] [phone] Your Updated Medication List  
  
   
This list is accurate as of 5/8/18  9:37 AM.  Always use your most recent med list.  
  
  
  
  
 ADVAIR DISKUS 500-50 mcg/dose diskus inhaler Generic drug:  fluticasone-salmeterol Take 1 Puff by inhalation every twelve (12) hours. albuterol 2.5 mg /3 mL (0.083 %) nebulizer solution Commonly known as:  PROVENTIL VENTOLIN  
3 mL by Nebulization route every four (4) hours as needed for Wheezing. ipratropium 0.02 % Soln Commonly known as:  ATROVENT  
2.5 mL by Nebulization route four (4) times daily as needed. levothyroxine 88 mcg tablet Commonly known as:  SYNTHROID  
TAKE ONE TABLET BY MOUTH EVERY MORNING BEFORE BREAKFAST  
  
 losartan 100 mg tablet Commonly known as:  COZAAR Take 1 Tab by mouth daily. lovastatin 20 mg tablet Commonly known as:  MEVACOR Take 1 Tab by mouth nightly. metoprolol tartrate 50 mg tablet Commonly known as:  LOPRESSOR  
TAKE ONE TABLET BY MOUTH TWICE A DAY  
  
 montelukast 10 mg tablet Commonly known as:  SINGULAIR Take 1 Tab by mouth daily. predniSONE 20 mg tablet Commonly known as:  DELTASONE  
40mg PO BID x 5 days  
  
 terazosin 1 mg capsule Commonly known as:  HYTRIN  
TAKE ONE CAPSULE BY MOUTH ONCE NIGHTLY VIAGRA 100 mg tablet Generic drug:  sildenafil citrate Take 1 Tab by mouth as needed. Prescriptions Sent to Pharmacy Refills  
 terazosin (HYTRIN) 1 mg capsule 5 Sig: TAKE ONE CAPSULE BY MOUTH ONCE NIGHTLY Class: Normal  
 Pharmacy: [facility]  [address], [address] [place] [address] Ph #: [phone]  
 metoprolol tartrate (LOPRESSOR) 50 mg tablet 1 Sig: TAKE ONE TABLET BY MOUTH TWICE A DAY Class: Normal  
 Pharmacy: [facility] [address], [address] [place] [address] Ph #: [phone]  
 predniSONE (DELTASONE) 20 mg tablet 0 Simg PO BID x 5 days Class: Normal  
 Pharmacy: [facility]ri  [address], [address] [place] [address] Ph #: [phone] We Performed the Following AMB POC GLUCOSE BLOOD, BY GLUCOSE MONITORING DEVICE [07399 CPT(R)] To-Do List   
 2018 Lab:  CBC WITH AUTOMATED DIFF   
  
 2018 Lab:  METABOLIC PANEL, BASIC   
  
 2018 Lab:  TSH 3RD GENERATION Patient Instructions Shoulder Bursitis: Exercises Your Care Instructions Here are some examples of typical rehabilitation exercises for your condition. Start each exercise slowly. Ease off the exercise if you start to have pain. Your doctor or physical therapist will tell you when you can start these exercises and which ones will work best for you. How to do the exercises Posterior stretching exercise 1. Hold the elbow of your injured arm with your other hand. 2. Use your hand to pull your injured arm gently up and across your body. You will feel a gentle stretch across the back of your injured shoulder. 3. Hold for at least 15 to 30 seconds. Then slowly lower your arm. 4. Repeat 2 to 4 times. Up-the-back stretch Your doctor or physical therapist may want you to wait to do this stretch until you have regained most of your range of motion and strength. You can do this stretch in different ways. Hold any of these stretches for at least 15 to 30 seconds. Repeat them 2 to 4 times. 1. Put your hand in your back pocket.  Let it rest there to stretch your shoulder. 2. With your other hand, hold your injured arm (palm outward) behind your back by the wrist. Pull your arm up gently to stretch your shoulder. 3. Next, put a towel over your other shoulder. Put the hand of your injured arm behind your back. Now hold the back end of the towel. With the other hand, hold the front end of the towel in front of your body. Pull gently on the front end of the towel. This will bring your hand farther up your back to stretch your shoulder. Overhead stretch 1. Standing about an arm's length away, grasp onto a solid surface. You could use a countertop, a doorknob, or the back of a sturdy chair. 2. With your knees slightly bent, bend forward with your arms straight. Lower your upper body, and let your shoulders stretch. 3. As your shoulders are able to stretch farther, you may need to take a step or two backward. 4. Hold for at least 15 to 30 seconds. Then stand up and relax. If you had stepped back during your stretch, step forward so you can keep your hands on the solid surface. 5. Repeat 2 to 4 times. Shoulder flexion (lying down) To make a wand for this exercise, use a piece of PVC pipe or a broom handle with the broom removed. Make the wand about a foot wider than your shoulders. 1. Lie on your back, holding a wand with both hands. Your palms should face down as you hold the wand. 2. Keeping your elbows straight, slowly raise your arms over your head. Raise them until you feel a stretch in your shoulders, upper back, and chest. 
3. Hold for 15 to 30 seconds. 4. Repeat 2 to 4 times. Shoulder rotation (lying down) To make a wand for this exercise, use a piece of PVC pipe or a broom handle with the broom removed. Make the wand about a foot wider than your shoulders. 1. Lie on your back. Hold a wand with both hands with your elbows bent and palms up. 2. Keep your elbows close to your body, and move the wand across your body toward the sore arm. 3. Hold for 8 to 12 seconds. 4. Repeat 2 to 4 times. Shoulder blade squeeze 1. Stand with your arms at your sides, and squeeze your shoulder blades together. Do not raise your shoulders up as you squeeze. 2. Hold 6 seconds. 3. Repeat 8 to 12 times. Shoulder flexor and extensor exercise These are isometric exercises. That means you contract your muscles without actually moving. 1. Push forward (flex): Stand facing a wall or doorjamb, about 6 inches or less back. Hold your injured arm against your body. Make a closed fist with your thumb on top. Then gently push your hand forward into the wall with about 25% to 50% of your strength. Don't let your body move backward as you push. Hold for about 6 seconds. Relax for a few seconds. Repeat 8 to 12 times. 2. Push backward (extend): Stand with your back flat against a wall. Your upper arm should be against the wall, with your elbow bent 90 degrees (your hand straight ahead). Push your elbow gently back against the wall with about 25% to 50% of your strength. Don't let your body move forward as you push. Hold for about 6 seconds. Relax for a few seconds. Repeat 8 to 12 times. Scapular exercise: Wall push-ups This exercise is best done with your fingers somewhat turned out, rather than straight up and down. 1. Stand facing a wall, about 12 inches to 18 inches away. 2. Place your hands on the wall at shoulder height. 3. Slowly bend your elbows and bring your face to the wall. Keep your back and hips straight. 4. Push back to where you started. 5. Repeat 8 to 12 times. 6. When you can do this exercise against a wall comfortably, you can try it against a counter. You can then slowly progress to the end of a couch, then to a sturdy chair, and finally to the floor. Scapular exercise: Retraction For this exercise, you will need elastic exercise material, such as surgical tubing or Thera-Band. 1. Put the band around a solid object at about waist level. (A bedpost will work well.) Each hand should hold an end of the band. 2. With your elbows at your sides and bent to 90 degrees, pull the band back. Your shoulder blades should move toward each other. Then move your arms back where you started. 3. Repeat 8 to 12 times. 4. If you have good range of motion in your shoulders, try this exercise with your arms lifted out to the sides. Keep your elbows at a 90-degree angle. Raise the elastic band up to about shoulder level. Pull the band back to move your shoulder blades toward each other. Then move your arms back where you started. Internal rotator strengthening exercise 1. Start by tying a piece of elastic exercise material to a doorknob. You can use surgical tubing or Thera-Band. 2. Stand or sit with your shoulder relaxed and your elbow bent 90 degrees. Your upper arm should rest comfortably against your side. Squeeze a rolled towel between your elbow and your body for comfort. This will help keep your arm at your side. 3. Hold one end of the elastic band in the hand of the painful arm. 4. Slowly rotate your forearm toward your body until it touches your belly. Slowly move it back to where you started. 5. Keep your elbow and upper arm firmly tucked against the towel roll or at your side. 6. Repeat 8 to 12 times. External rotator strengthening exercise 1. Start by tying a piece of elastic exercise material to a doorknob. You can use surgical tubing or Thera-Band. (You may also hold one end of the band in each hand.) 2. Stand or sit with your shoulder relaxed and your elbow bent 90 degrees. Your upper arm should rest comfortably against your side. Squeeze a rolled towel between your elbow and your body for comfort. This will help keep your arm at your side. 3. Hold one end of the elastic band with the hand of the painful arm. 4. Start with your forearm across your belly. Slowly rotate the forearm out away from your body. Keep your elbow and upper arm tucked against the towel roll or the side of your body until you begin to feel tightness in your shoulder. Slowly move your arm back to where you started. 5. Repeat 8 to 12 times. Follow-up care is a key part of your treatment and safety. Be sure to make and go to all appointments, and call your doctor if you are having problems. It's also a good idea to know your test results and keep a list of the medicines you take. Where can you learn more? Go to http://buffy-christiano.info/. Enter A746 in the search box to learn more about \"Shoulder Bursitis: Exercises. \" Current as of: March 21, 2017 Content Version: 11.4 © 7115-2895 [employer]. Care instructions adapted under license by [employer] (which disclaims liability or warranty for this information). If you have questions about a medical condition or this instruction, always ask your healthcare professional. [address] any warranty or liability for your use of this information. Introducing [facility]! Maia Hahn introduces [employer] patient portal. Now you can access parts of your medical record, email your doctor's office, and request medication refills online. 1. In your internet browser, go to [email or web address] 2. Click on the First Time User? Click Here link in the Sign In box. You will see the New Member Sign Up page. 3. Enter your [employer] Access Code exactly as it appears below. You will not need to use this code after youve completed the sign-up process. If you do not sign up before the expiration date, you must request a new code. · [employer] Access Code: [email or web address] Expires: 8/6/2018  9:37 AM 
 
4.  Enter the last four digits of your Social Security Number (xxxx) and Date of Birth (mm/dd/yyyy) as indicated and click Submit. You will be taken to the next sign-up page. 5. Create a [employer] ID. This will be your [employer] login ID and cannot be changed, so think of one that is secure and easy to remember. 6. Create a [employer] password. You can change your password at any time. 7. Enter your Password Reset Question and Answer. This can be used at a later time if you forget your password. 8. Enter your e-mail address. You will receive e-mail notification when new information is available in [address] 9. Click Sign Up. You can now view and download portions of your medical record. 10. Click the Download Summary menu link to download a portable copy of your medical information. If you have questions, please visit the Frequently Asked Questions section of the [employer] website. Remember, [employer] is NOT to be used for urgent needs. For medical emergencies, dial 911. Now available from your iPhone and Android! Please provide this summary of care documentation to your next provider. Your primary care clinician is listed as Laura Roberts. If you have any questions after today's visit, please call [phone].

## 2018-05-08 NOTE — PROGRESS NOTES
Coordination of Care  1. Have you been to the ER, urgent care clinic since your last visit? Hospitalized since your last visit? No    2. Have you seen or consulted any other health care providers outside of the [address] [place] [address] since your last visit? Include any pap smears or colon screening. No    Does the patient need refills? YES    Learning Assessment Complete?  yes  Depression Screening complete in the past 12 months? yes  Results for orders placed or performed in visit on 05/08/18   AMB POC GLUCOSE BLOOD, BY GLUCOSE MONITORING DEVICE   Result Value Ref Range    Glucose POC  mg/dL

## 2018-05-09 ENCOUNTER — PATIENT OUTREACH (OUTPATIENT)
Dept: FAMILY MEDICINE CLINIC | Age: 68
End: 2018-05-09

## 2018-05-09 NOTE — ACP (ADVANCE CARE PLANNING)
Patient declined [facility] [place], [facility]. ACP invite by provider. Daniel Figueroa

## 2018-07-12 ENCOUNTER — OFFICE VISIT (OUTPATIENT)
Dept: FAMILY MEDICINE CLINIC | Age: 68
End: 2018-07-12

## 2018-07-12 VITALS
HEIGHT: 69 IN | SYSTOLIC BLOOD PRESSURE: 131 MMHG | DIASTOLIC BLOOD PRESSURE: 70 MMHG | WEIGHT: 241 LBS | BODY MASS INDEX: 35.7 KG/M2 | HEART RATE: 69 BPM | OXYGEN SATURATION: 94 % | TEMPERATURE: 98 F

## 2018-07-12 DIAGNOSIS — R06.09 DYSPNEA ON EXERTION: Primary | ICD-10-CM

## 2018-07-12 DIAGNOSIS — R07.9 CHEST PAIN, UNSPECIFIED TYPE: ICD-10-CM

## 2018-07-12 DIAGNOSIS — E11.21 TYPE 2 DIABETES MELLITUS WITH NEPHROPATHY (HCC): ICD-10-CM

## 2018-07-12 LAB — GLUCOSE POC: NORMAL MG/DL

## 2018-07-12 NOTE — PROGRESS NOTES
Coordination of Care  1. Have you been to the ER, urgent care clinic since your last visit? Hospitalized since your last visit? No    2. Have you seen or consulted any other health care providers outside of the [facility] since your last visit? Include any pap smears or colon screening. No    Does the patient need refills? YES    Learning Assessment Complete?  yes  Depression Screening complete in the past 12 months? yes  Results for orders placed or performed in visit on 07/12/18   AMB POC GLUCOSE BLOOD, BY GLUCOSE MONITORING DEVICE   Result Value Ref Range    Glucose  nf mg/dL

## 2018-07-12 NOTE — MR AVS SNAPSHOT
[address] [place] [address] [address] [id number] 
[phone] Patient: Yinka Jon MRN: [id number] PCR:[id number] Visit Information Date & Time Provider Department Dept. Phone Encounter #  
 7/12/2018  8:50 AM Tolu Adrian [facility] [phone] [id number] Upcoming Health Maintenance Date Due COLONOSCOPY 2/19/1969 DTaP/Tdap/Td series (1 - Tdap) 2/19/1972 ZOSTER VACCINE AGE 60> 12/19/2010 EYE EXAM RETINAL OR DILATED Q1 7/1/2014 GLAUCOMA SCREENING Q2Y 2/19/2016 FOOT EXAM Q1 10/13/2016 MEDICARE YEARLY EXAM 3/14/2018 MICROALBUMIN Q1 6/21/2018 Influenza Age 5 to Adult 8/1/2018 HEMOGLOBIN A1C Q6M 9/28/2018 LIPID PANEL Q1 10/25/2018 Allergies as of 7/12/2018  Review Complete On: 7/12/2018 By: Juan Jose Sullivan NP Severity Noted Reaction Type Reactions Bactrim [Sulfamethoprim Ds]  12/03/2015    Diarrhea, Nausea and Vomiting After on 1st dose. Morphine  08/15/2011    Unknown (comments) Current Immunizations  Reviewed on 10/25/2017 Name Date Influenza Vaccine 10/22/2014, 1/24/2014, 10/1/2012 Influenza Vaccine (Quad) PF 10/25/2017, 12/28/2016, 10/22/2015 Pneumococcal Polysaccharide (PPSV-23) 12/28/2016 Pneumococcal Vaccine (Unspecified Type) 1/1/2010 Not reviewed this visit You Were Diagnosed With   
  
 Codes Comments Dyspnea on exertion    -  Primary ICD-10-CM: R06.09 
ICD-9-CM: 786.09 Type 2 diabetes mellitus with nephropathy (HCC)     ICD-10-CM: E11.21 
ICD-9-CM: 250.40, 583.81 Chest pain, unspecified type     ICD-10-CM: R07.9 ICD-9-CM: 786.50 Vitals BP Pulse Temp Height(growth percentile) Weight(growth percentile) BMI  
 131/70 (BP 1 Location: Left arm) 69 98 °F (36.7 °C) (Oral) 5' 9.02\" (1.753 m) 241 lb (109.3 kg) 35.57 kg/m2 Smoking Status Former Smoker Vitals History BMI and BSA Data Body Mass Index Body Surface Area 35.57 kg/m 2 2.31 m 2 Preferred Pharmacy Pharmacy Name Phone [facility] [address], [address] [facility] [phone] Your Updated Medication List  
  
   
This list is accurate as of 7/12/18 10:07 AM.  Always use your most recent med list.  
  
  
  
  
 ADVAIR DISKUS 500-50 mcg/dose diskus inhaler Generic drug:  fluticasone-salmeterol Take 1 Puff by inhalation every twelve (12) hours. albuterol 2.5 mg /3 mL (0.083 %) nebulizer solution Commonly known as:  PROVENTIL VENTOLIN  
3 mL by Nebulization route every four (4) hours as needed for Wheezing. ipratropium 0.02 % Soln Commonly known as:  ATROVENT  
2.5 mL by Nebulization route four (4) times daily as needed. levothyroxine 88 mcg tablet Commonly known as:  SYNTHROID  
TAKE ONE TABLET BY MOUTH EVERY MORNING BEFORE BREAKFAST  
  
 losartan 100 mg tablet Commonly known as:  COZAAR Take 1 Tab by mouth daily. lovastatin 20 mg tablet Commonly known as:  MEVACOR Take 1 Tab by mouth nightly. metoprolol tartrate 50 mg tablet Commonly known as:  LOPRESSOR  
TAKE ONE TABLET BY MOUTH TWICE A DAY  
  
 montelukast 10 mg tablet Commonly known as:  SINGULAIR Take 1 Tab by mouth daily. terazosin 1 mg capsule Commonly known as:  HYTRIN  
TAKE ONE CAPSULE BY MOUTH ONCE NIGHTLY VIAGRA 100 mg tablet Generic drug:  sildenafil citrate Take 1 Tab by mouth as needed. We Performed the Following AMB POC GLUCOSE BLOOD, BY GLUCOSE MONITORING DEVICE [67418 CPT(R)] REFERRAL TO CARDIOLOGY [NYL16 Custom] To-Do List   
 07/12/2018 ECHO:  ECHO TTE STRESS EXERCISE TREADMILL COMP   
  
 07/20/2018 ECHO:  ECHO TTE STRESS COMP W OR WO CONTR Referral Information Referral ID Referred By Referred To  
  
 [id number] Familia Gonzalez MD   
   [id number] [facility] [place] [address] [facility] [address] Phone: [phone] Fax: [phone] Visits Status Start Date End Date 1 New Request 7/12/18 7/12/19 If your referral has a status of pending review or denied, additional information will be sent to support the outcome of this decision. Patient Instructions Chest Pain: Care Instructions Your Care Instructions There are many things that can cause chest pain. Some are not serious and will get better on their own in a few days. But some kinds of chest pain need more testing and treatment. Your doctor may have recommended a follow-up visit in the next 8 to 12 hours. If you are not getting better, you may need more tests or treatment. Even though your doctor has released you, you still need to watch for any problems. The doctor carefully checked you, but sometimes problems can develop later. If you have new symptoms or if your symptoms do not get better, get medical care right away. If you have worse or different chest pain or pressure that lasts more than 5 minutes or you passed out (lost consciousness), call 911 or seek other emergency help right away. A medical visit is only one step in your treatment. Even if you feel better, you still need to do what your doctor recommends, such as going to all suggested follow-up appointments and taking medicines exactly as directed. This will help you recover and help prevent future problems. How can you care for yourself at home? · Rest until you feel better. · Take your medicine exactly as prescribed. Call your doctor if you think you are having a problem with your medicine. · Do not drive after taking a prescription pain medicine. When should you call for help? Call 911 if: 
  · You passed out (lost consciousness).  
  · You have severe difficulty breathing.  
  · You have symptoms of a heart attack. These may include: ¨ Chest pain or pressure, or a strange feeling in your chest. 
¨ Sweating. ¨ Shortness of breath. ¨ Nausea or vomiting. ¨ Pain, pressure, or a strange feeling in your back, neck, jaw, or upper belly or in one or both shoulders or arms. ¨ Lightheadedness or sudden weakness. ¨ A fast or irregular heartbeat. After you call 911, the  may tell you to chew 1 adult-strength or 2 to 4 low-dose aspirin. Wait for an ambulance. Do not try to drive yourself.  
 Call your doctor today if: 
  · You have any trouble breathing.  
  · Your chest pain gets worse.  
  · You are dizzy or lightheaded, or you feel like you may faint.  
  · You are not getting better as expected.  
  · You are having new or different chest pain. Where can you learn more? Go to http://buffy-christiano.info/. Enter A120 in the search box to learn more about \"Chest Pain: Care Instructions. \" Current as of: November 20, 2017 Content Version: 11.7 © 9726-2241 [email or web address]. Care instructions adapted under license by [employer] (which disclaims liability or warranty for this information). If you have questions about a medical condition or this instruction, always ask your healthcare professional. [address] any warranty or liability for your use of this information. Introducing [facility]! Zen Fuentes introduces GoTunes patient portal. Now you can access parts of your medical record, email your doctor's office, and request medication refills online. 1. In your internet browser, go to [email or web address] Number [email or web address] 2. Click on the First Time User? Click Here link in the Sign In box. You will see the New Member Sign Up page. 3. Enter your GoTunes Access Code exactly as it appears below. You will not need to use this code after youve completed the sign-up process. If you do not sign up before the expiration date, you must request a new code. · GoTunes Access Code: [email or web address] Expires: 8/6/2018  9:37 AM 
 
 4. Enter the last four digits of your Social Security Number (xxxx) and Date of Birth (mm/dd/yyyy) as indicated and click Submit. You will be taken to the next sign-up page. 5. Create a [employer] ID. This will be your [employer] login ID and cannot be changed, so think of one that is secure and easy to remember. 6. Create a [employer] password. You can change your password at any time. 7. Enter your Password Reset Question and Answer. This can be used at a later time if you forget your password. 8. Enter your e-mail address. You will receive e-mail notification when new information is available in [address] 9. Click Sign Up. You can now view and download portions of your medical record. 10. Click the Download Summary menu link to download a portable copy of your medical information. If you have questions, please visit the Frequently Asked Questions section of the [employer] website. Remember, [employer] is NOT to be used for urgent needs. For medical emergencies, dial 911. Now available from your iPhone and Android! Please provide this summary of care documentation to your next provider. Your primary care clinician is listed as Nidia Saldivar. If you have any questions after today's visit, please call [phone].

## 2018-07-12 NOTE — PATIENT INSTRUCTIONS
Chest Pain: Care Instructions  Your Care Instructions    There are many things that can cause chest pain. Some are not serious and will get better on their own in a few days. But some kinds of chest pain need more testing and treatment. Your doctor may have recommended a follow-up visit in the next 8 to 12 hours. If you are not getting better, you may need more tests or treatment. Even though your doctor has released you, you still need to watch for any problems. The doctor carefully checked you, but sometimes problems can develop later. If you have new symptoms or if your symptoms do not get better, get medical care right away. If you have worse or different chest pain or pressure that lasts more than 5 minutes or you passed out (lost consciousness), call 911 or seek other emergency help right away. A medical visit is only one step in your treatment. Even if you feel better, you still need to do what your doctor recommends, such as going to all suggested follow-up appointments and taking medicines exactly as directed. This will help you recover and help prevent future problems. How can you care for yourself at home? · Rest until you feel better. · Take your medicine exactly as prescribed. Call your doctor if you think you are having a problem with your medicine. · Do not drive after taking a prescription pain medicine. When should you call for help? Call 911 if:    · You passed out (lost consciousness).     · You have severe difficulty breathing.     · You have symptoms of a heart attack. These may include:  ¨ Chest pain or pressure, or a strange feeling in your chest.  ¨ Sweating. ¨ Shortness of breath. ¨ Nausea or vomiting. ¨ Pain, pressure, or a strange feeling in your back, neck, jaw, or upper belly or in one or both shoulders or arms. ¨ Lightheadedness or sudden weakness. ¨ A fast or irregular heartbeat.   After you call 911, the  may tell you to chew 1 adult-strength or 2 to 4 low-dose aspirin. Wait for an ambulance. Do not try to drive yourself.    Call your doctor today if:    · You have any trouble breathing.     · Your chest pain gets worse.     · You are dizzy or lightheaded, or you feel like you may faint.     · You are not getting better as expected.     · You are having new or different chest pain. Where can you learn more? Go to http://buffy-christiano.info/. Enter A120 in the search box to learn more about \"Chest Pain: Care Instructions. \"  Current as of: November 20, 2017  Content Version: 11.7  © 3946-1419 [employer]. Care instructions adapted under license by [employer] (which disclaims liability or warranty for this information). If you have questions about a medical condition or this instruction, always ask your healthcare professional. [address] any warranty or liability for your use of this information.

## 2018-07-12 NOTE — PROGRESS NOTES
Subjective:   No chief complaint on file. He  is a 80 y.o. male who presents for evaluation of chronic DOs. Pt reports he feels like his breathing is worse, noting taking out the trash and walking 1 blocks. Pt called some DME suppliers and was advised he needed an Rx for oxygen. No prior Hx of oxygen use. Notes previously was able to endure more. Onset has insidious and is wondering if he needs oxygen. Feels a constant pressure on his chest, not r/t activity. Has been chronic for the last 1-2 years. Does not change w/ activity. No near syncope, orthopnea, PND nor palpitations. ROS  Gen - no fever/chills  Resp - no dyspnea or cough  CV - no chest pain or PERRY  Rest per HPI    Past Medical History:   Diagnosis Date    Blurred vision     Cirrhosis, alcoholic (HCC)     COPD bronchitis     Diabetes mellitus ([address]     Ear discomfort     History of ETOH abuse     HTN (hypertension)     SOB (shortness of breath)     Thrombocytopenia ([address]     Trouble in sleeping      Past Surgical History:   Procedure Laterality Date    HX CATARACT REMOVAL Right 2007    HX CATARACT REMOVAL Left 2012    HX CIRCUMCISION      at 24 y/o    HX HERNIA REPAIR  as a youth    right side    HX SHOULDER ARTHROSCOPY      left x 3 for torn rotator cuff muscle     Current Outpatient Prescriptions on File Prior to Visit   Medication Sig Dispense Refill    terazosin (HYTRIN) 1 mg capsule TAKE ONE CAPSULE BY MOUTH ONCE NIGHTLY 30 Cap 5    metoprolol tartrate (LOPRESSOR) 50 mg tablet TAKE ONE TABLET BY MOUTH TWICE A  Tab 1    losartan (COZAAR) 100 mg tablet Take 1 Tab by mouth daily. 30 Tab 3    montelukast (SINGULAIR) 10 mg tablet Take 1 Tab by mouth daily. 90 Tab 3    albuterol (PROVENTIL VENTOLIN) 2.5 mg /3 mL (0.083 %) nebulizer solution 3 mL by Nebulization route every four (4) hours as needed for Wheezing.  180 Each 5    ipratropium (ATROVENT) 0.02 % soln 2.5 mL by Nebulization route four (4) times daily as needed. 180 mL 5    VIAGRA 100 mg tablet Take 1 Tab by mouth as needed. 30 Tab 3    lovastatin (MEVACOR) 20 mg tablet Take 1 Tab by mouth nightly. 90 Tab 1    levothyroxine (SYNTHROID) 88 mcg tablet TAKE ONE TABLET BY MOUTH EVERY MORNING BEFORE BREAKFAST 90 Tab 3    fluticasone-salmeterol (ADVAIR DISKUS) 500-50 mcg/dose diskus inhaler Take 1 Puff by inhalation every twelve (12) hours. No current facility-administered medications on file prior to visit. Objective:     Vitals:    07/12/18 0851   BP: 131/70   Pulse: 69   Temp: 98 °F (36.7 °C)   TempSrc: Oral   Weight: 241 lb (109.3 kg)   Height: 5' 9.02\" (1.753 m)       Physical Examination:  General appearance - alert, well appearing, and in no distress  Eyes -sclera anicteric  Neck - supple, no significant adenopathy, no thyromegaly  Chest - clear to auscultation, no wheezes, rales or rhonchi, symmetric air entry  Heart - normal rate, regular rhythm, normal S1, S2, no murmurs, rubs, clicks or gallops  Neurological - alert, oriented, no focal findings or movement disorder noted    +1 pitting edema in bilat extremities. Assessment/ Plan:   Diagnoses and all orders for this visit:    1. Dyspnea on exertion  -     [facility]  -     ECHO TTE STRESS EXERCISE TREADMILL COMP; Future  -     ECHO TTE STRESS EXRCSE COMP W OR WO CONTR; Future    2. Type 2 diabetes mellitus with nephropathy (HCC)  -     AMB POC GLUCOSE BLOOD, BY GLUCOSE MONITORING DEVICE    3. Chest pain, unspecified type  -     [facility]  -     ECHO TTE STRESS EXERCISE TREADMILL COMP; Future  -     ECHO TTE STRESS EXRCSE COMP W OR WO CONTR; Future         Low suspicion Pt's breathing is r/t pulmonary status. Pt has not been able to see pulmonary r/t financial concerns/OOP costs w/ his Medicare plan. Review of records notes CTA from 2010 and echo from 2013 with mention of \"Atherosclerotic aorta with coronary artery disease. \" and LVH from echo. Amb sat did not drop < 94% after 5-6 min of walking. CXR from 1/18 was WNL. Refer to cardiology for eval of chronic CP and SOB/PERRY. Refer for stress test prior to OV. Discussed financial assist options r/t Pt's concern about 20% out of pocket expenses since he does not have secondary. Discussed emergency S&S warranting ED eval.     RTC in 6-8 weeks s/p cardiology eval.       I have discussed the diagnosis with the patient and the intended plan as seen in the above orders. The patient has received an after-visit summary and questions were answered concerning future plans. I have discussed medication side effects and warnings with the patient as well. The patient verbalizes understanding and agreement with the plan.     Follow-up Disposition: Not on File

## 2018-07-17 DIAGNOSIS — N52.9 ERECTILE DYSFUNCTION, UNSPECIFIED ERECTILE DYSFUNCTION TYPE: ICD-10-CM

## 2018-07-17 NOTE — TELEPHONE ENCOUNTER
Received refill request for Viagra 100 mgs #90 (last filled on 4/12/18) from [place]. Forwarding to BRIE Snyder NP

## 2018-07-17 NOTE — TELEPHONE ENCOUNTER
Message from Pedro Pemberton NP   \"This prescription is very expensive, does he really want it sent to [facility] since he just picked up PAP Viagra on 7/12/18. Can you clarify with him re: this, also reminding him of his stress test and cardiology appt that he needs to schedule?  He has been c/o of SOB x the last 4-6 months, his amb sats never dipped under 94% and his CXR from 6-7 months ago was WNL, so I strongly encouraged him to have stress test and to see cardiology\"    Called pt LM asking him to call the clinic to get a message from [employer]

## 2018-07-18 RX ORDER — SILDENAFIL CITRATE 100 MG/1
100 TABLET, FILM COATED ORAL AS NEEDED
Qty: 90 TAB | Refills: 0 | OUTPATIENT
Start: 2018-07-18

## 2018-07-18 NOTE — TELEPHONE ENCOUNTER
Avinash Schneider NP reviewed pt med list and said that pt can continue all meds as scheduled for his ECHO stress test on Friday. I called pt and there was no answer. LM on phone for pt to call back.  Maryann Omalley RN

## 2018-07-19 ENCOUNTER — TELEPHONE (OUTPATIENT)
Dept: FAMILY MEDICINE CLINIC | Age: 68
End: 2018-07-19

## 2018-07-19 NOTE — TELEPHONE ENCOUNTER
Patient was contacted, he said he did speak earlier to someone from [facility] about the stress test who said it was good he did not take any of his medications except his thyroid medication and will refrain from taking meds tomorrow a.m.  Before stress test.

## 2018-07-19 NOTE — TELEPHONE ENCOUNTER
Attempted to return patient call, no answer. Left pt a message explaining that he can take all of his medications on his current list before stress test and to call the clinic with any questions.

## 2018-07-19 NOTE — TELEPHONE ENCOUNTER
Patient called wanting to know what medications he should NOT take before stress test tomorrow. Please advise.

## 2018-07-20 ENCOUNTER — HOSPITAL ENCOUNTER (OUTPATIENT)
Dept: NON INVASIVE DIAGNOSTICS | Age: 68
Discharge: HOME | End: 2018-07-20
Attending: NURSE PRACTITIONER
Payer: MEDICARE

## 2018-07-20 ENCOUNTER — DOCUMENTATION ONLY (OUTPATIENT)
Dept: CARDIOLOGY CLINIC | Age: 68
End: 2018-07-20

## 2018-07-20 DIAGNOSIS — R07.9 CHEST PAIN, UNSPECIFIED TYPE: ICD-10-CM

## 2018-07-20 DIAGNOSIS — R06.09 DYSPNEA ON EXERTION: ICD-10-CM

## 2018-07-20 LAB
ATTENDING PHYSICIAN, CST07: NORMAL
DIAGNOSIS, 93000: NORMAL
DUKE TM SCORE RESULT, CST14: NORMAL
DUKE TREADMILL SCORE, CST13: 5456
ECG INTERP BEFORE EX, CST11: NORMAL
ECG INTERP DURING EX, CST12: NORMAL
FUNCTIONAL CAPACITY, CST17: NORMAL
KNOWN CARDIAC CONDITION, CST08: NORMAL
MAX. DIASTOLIC BP, CST04: 100 MMHG
MAX. HEART RATE, CST05: 160 BPM
MAX. SYSTOLIC BP, CST03: 210 MMHG
OVERALL BP RESPONSE TO EXERCISE, CST16: NORMAL
OVERALL HR RESPONSE TO EXERCISE, CST15: NORMAL
PEAK EX METS, CST10: 3.6 METS
PROTOCOL NAME, CST01: NORMAL
REASON FOR TERMINATION: 90 BPM
TEST INDICATION, CST09: NORMAL
TIME IN EXERCISE PHASE, CST02: NORMAL

## 2018-07-20 PROCEDURE — 93351 STRESS TTE COMPLETE: CPT

## 2018-07-20 NOTE — PROGRESS NOTES
Abnormal stress Echo. Advised pt to stay in hospital and further evaluation by cardiac cath, however he decline any further evaluation for now. Decided to go home and will call office with out pt f/u. Advised to take low dose aspirin and go to ER if develop any symptoms.

## 2018-07-23 ENCOUNTER — TELEPHONE (OUTPATIENT)
Dept: FAMILY MEDICINE CLINIC | Age: 68
End: 2018-07-23

## 2018-07-23 NOTE — TELEPHONE ENCOUNTER
Pt returned my call. Explained to him the need to be seen sooner. He thought that the stress ECHO couldn't be completed because he became short of breath. Explained that he had some abnormal heart beats and needs to be seen asap. Called Dr Washington's office to see if appt could be made sooner than 8/20.  took my message. She may be able to get him seen this week by an NP in the office. She has forwarded the message to get approval and will call the clinic when she hears back. I received a return call. [facility], has already spoken to Mr Martin Malik give him the appointment. Pt is scheduled to see Jourdan Linda NP tomorrow.

## 2018-07-23 NOTE — TELEPHONE ENCOUNTER
Per Nicolasa Blunt NP, pt needs to see Cardiology asap. His Stress test was abnormal. Pt has appt to be seen on 8/14. Called BRAULIO roche with a woman.   She will ask him to call the nurse at [facility]

## 2018-07-24 ENCOUNTER — HOSPITAL ENCOUNTER (OUTPATIENT)
Dept: LAB | Age: 68
Discharge: HOME | End: 2018-07-24
Payer: MEDICARE

## 2018-07-24 ENCOUNTER — OFFICE VISIT (OUTPATIENT)
Dept: CARDIOLOGY CLINIC | Age: 68
End: 2018-07-24

## 2018-07-24 VITALS
HEART RATE: 90 BPM | SYSTOLIC BLOOD PRESSURE: 140 MMHG | WEIGHT: 242.1 LBS | OXYGEN SATURATION: 96 % | DIASTOLIC BLOOD PRESSURE: 72 MMHG | RESPIRATION RATE: 20 BRPM | HEIGHT: 69 IN | BODY MASS INDEX: 35.86 KG/M2

## 2018-07-24 DIAGNOSIS — G47.33 OBSTRUCTIVE SLEEP APNEA (ADULT) (PEDIATRIC): ICD-10-CM

## 2018-07-24 DIAGNOSIS — J43.9 PULMONARY EMPHYSEMA, UNSPECIFIED EMPHYSEMA TYPE (HCC): ICD-10-CM

## 2018-07-24 DIAGNOSIS — R94.39 ABNORMAL STRESS TEST: ICD-10-CM

## 2018-07-24 DIAGNOSIS — E78.00 HIGH CHOLESTEROL: ICD-10-CM

## 2018-07-24 DIAGNOSIS — R06.09 DOE (DYSPNEA ON EXERTION): Primary | ICD-10-CM

## 2018-07-24 DIAGNOSIS — E11.21 TYPE 2 DIABETES MELLITUS WITH NEPHROPATHY (HCC): ICD-10-CM

## 2018-07-24 DIAGNOSIS — I10 ESSENTIAL HYPERTENSION: ICD-10-CM

## 2018-07-24 PROCEDURE — 82550 ASSAY OF CK (CPK): CPT

## 2018-07-24 PROCEDURE — 36415 COLL VENOUS BLD VENIPUNCTURE: CPT

## 2018-07-24 PROCEDURE — 85025 COMPLETE CBC W/AUTO DIFF WBC: CPT

## 2018-07-24 PROCEDURE — 80053 COMPREHEN METABOLIC PANEL: CPT

## 2018-07-24 RX ORDER — METFORMIN HYDROCHLORIDE 500 MG/1
TABLET, EXTENDED RELEASE ORAL
Refills: 0 | Status: ON HOLD | COMMUNITY
Start: 2018-05-17 | End: 2018-07-30

## 2018-07-24 NOTE — PROGRESS NOTES
1. Have you been to the ER, urgent care clinic since your last visit? Hospitalized since your last visit? No    2. Have you seen or consulted any other health care providers outside of the [address] [place] [address] since your last visit? Include any pap smears or colon screening.  No    Chief Complaint   Patient presents with    Results     stress test     Pt denies any cardiac complaints

## 2018-07-24 NOTE — MR AVS SNAPSHOT
[address] [facility] 
[phone] Patient: Avis Cadena MRN: [id number] KIDD:4/57/0133 Visit Information Date & Time Provider Department Dept. Phone Encounter #  
 7/24/2018 10:30 AM Taras Jay NP [facility] [phone] [id number] Your Appointments 7/25/2018  9:00 AM  
ECHO CARDIOGRAMS 2D with [address] [facility]) Appt Note: Rey Chadwick 2D ECHO COMPLETE ADULT (TTE) W OR WO CONTR [[id number]] (Order [id number]) ,[address] [facility]  
[phone] [address] [facility]  
  
    
 8/14/2018  9:00 AM  
ESTABLISHED PATIENT with Tanya Sandoval MD  
[facility]) Appt Note: new to office - hospital f/u per Dr. Julieanne Hamman [facility]  
[phone] [address] [facility] Upcoming Health Maintenance Date Due COLONOSCOPY 2/19/1969 DTaP/Tdap/Td series (1 - Tdap) 2/19/1972 ZOSTER VACCINE AGE 60> 12/19/2010 EYE EXAM RETINAL OR DILATED Q1 7/1/2014 GLAUCOMA SCREENING Q2Y 2/19/2016 FOOT EXAM Q1 10/13/2016 MEDICARE YEARLY EXAM 3/14/2018 MICROALBUMIN Q1 6/21/2018 Influenza Age 5 to Adult 8/1/2018 HEMOGLOBIN A1C Q6M 9/28/2018 LIPID PANEL Q1 10/25/2018 Allergies as of 7/24/2018  Review Complete On: 7/24/2018 By: Taras Jay NP Severity Noted Reaction Type Reactions Bactrim [Sulfamethoprim Ds]  12/03/2015    Diarrhea, Nausea and Vomiting After on 1st dose. Morphine  08/15/2011    Unknown (comments) Current Immunizations  Reviewed on 10/25/2017 Name Date Influenza Vaccine 10/22/2014, 1/24/2014, 10/1/2012 Influenza Vaccine (Quad) PF 10/25/2017, 12/28/2016, 10/22/2015 Pneumococcal Polysaccharide (PPSV-23) 12/28/2016 Pneumococcal Vaccine (Unspecified Type) 1/1/2010 Not reviewed this visit You Were Diagnosed With   
  
 Codes Comments PERRY (dyspnea on exertion)    -  Primary ICD-10-CM: R06.09 
ICD-9-CM: 786.09 Essential hypertension     ICD-10-CM: I10 
ICD-9-CM: 401.9 Type 2 diabetes mellitus with nephropathy (HCC)     ICD-10-CM: E11.21 
ICD-9-CM: 250.40, 583.81 Pulmonary emphysema, unspecified emphysema type ([facility] [address]     ICD-10-CM: J43.9 ICD-9-CM: 492.8 Obstructive sleep apnea (adult) (pediatric)     ICD-10-CM: G47.33 
ICD-9-CM: 327.23 High cholesterol     ICD-10-CM: E78.00 ICD-9-CM: 272.0 Abnormal stress test     ICD-10-CM: R94.39 
ICD-9-CM: 794.39 Vitals BP Pulse Resp Height(growth percentile) Weight(growth percentile) SpO2  
 140/72 (BP 1 Location: Right arm, BP Patient Position: Sitting) 90 20 5' 9\" (1.753 m) 242 lb 1.6 oz (109.8 kg) 96% BMI Smoking Status 35.75 kg/m2 Former Smoker Vitals History BMI and BSA Data Body Mass Index Body Surface Area 35.75 kg/m 2 2.31 m 2 Preferred Pharmacy Pharmacy Name Phone [facility] Astud[facility] [address] [place] [address], [address] [place] [address] [phone] Your Updated Medication List  
  
   
This list is accurate as of 7/24/18 11:14 AM.  Always use your most recent med list.  
  
  
  
  
 ADVAIR DISKUS 500-50 mcg/dose diskus inhaler Generic drug:  fluticasone-salmeterol Take 1 Puff by inhalation every twelve (12) hours. albuterol 2.5 mg /3 mL (0.083 %) nebulizer solution Commonly known as:  PROVENTIL VENTOLIN  
3 mL by Nebulization route every four (4) hours as needed for Wheezing. ipratropium 0.02 % Soln Commonly known as:  ATROVENT  
2.5 mL by Nebulization route four (4) times daily as needed. levothyroxine 88 mcg tablet Commonly known as:  SYNTHROID  
TAKE ONE TABLET BY MOUTH EVERY MORNING BEFORE BREAKFAST  
  
 losartan 100 mg tablet Commonly known as:  COZAAR  
 Take 1 Tab by mouth daily. lovastatin 20 mg tablet Commonly known as:  MEVACOR Take 1 Tab by mouth nightly. metFORMIN  mg tablet Commonly known as:  GLUCOPHAGE XR  
TAKE ONE TABLET BY MOUTH ONE TIME DAILY  
  
 metoprolol tartrate 50 mg tablet Commonly known as:  LOPRESSOR  
TAKE ONE TABLET BY MOUTH TWICE A DAY  
  
 montelukast 10 mg tablet Commonly known as:  SINGULAIR Take 1 Tab by mouth daily. terazosin 1 mg capsule Commonly known as:  HYTRIN  
TAKE ONE CAPSULE BY MOUTH ONCE NIGHTLY VIAGRA 100 mg tablet Generic drug:  sildenafil citrate Take 1 Tab by mouth as needed. We Performed the Following 2D ECHO COMPLETE ADULT (TTE) W OR WO CONTR [07573 CPT(R)] AMB POC EKG ROUTINE W/ 12 LEADS, INTER & REP [17531 CPT(R)] AMB POC EKG ROUTINE W/ 12 LEADS, INTER & REP [10790 CPT(R)] CBC WITH AUTOMATED DIFF [70033 CPT(R)] CK T984857 CPT(R)] METABOLIC PANEL, COMPREHENSIVE [96078 CPT(R)] To-Do List   
 07/30/2018 Cardiac Services:  CARDIAC CATHETERIZATION Introducing [facility] & HEALTH SERVICES! [facility] introduces Zyncd patient portal. Now you can access parts of your medical record, email your doctor's office, and request medication refills online. 1. In your internet browser, go to [email or web address] 2. Click on the First Time User? Click Here link in the Sign In box. You will see the New Member Sign Up page. 3. Enter your Zyncd Access Code exactly as it appears below. You will not need to use this code after youve completed the sign-up process. If you do not sign up before the expiration date, you must request a new code. · Zyncd Access Code: [email or web address] Expires: 8/6/2018  9:37 AM 
 
4. Enter the last four digits of your Social Security Number (xxxx) and Date of Birth (mm/dd/yyyy) as indicated and click Submit. You will be taken to the next sign-up page. 5. Create a Atilekt ID. This will be your Atilekt login ID and cannot be changed, so think of one that is secure and easy to remember. 6. Create a Atilekt password. You can change your password at any time. 7. Enter your Password Reset Question and Answer. This can be used at a later time if you forget your password. 8. Enter your e-mail address. You will receive e-mail notification when new information is available in [address] 9. Click Sign Up. You can now view and download portions of your medical record. 10. Click the Download Summary menu link to download a portable copy of your medical information. If you have questions, please visit the Frequently Asked Questions section of the Atilekt website. Remember, Atilekt is NOT to be used for urgent needs. For medical emergencies, dial 911. Now available from your iPhone and Android! Please provide this summary of care documentation to your next provider. Your primary care clinician is listed as Jonathan Burton. Shabana Champagne. If you have any questions after today's visit, please call [phone].

## 2018-07-25 ENCOUNTER — CLINICAL SUPPORT (OUTPATIENT)
Dept: CARDIOLOGY CLINIC | Age: 68
End: 2018-07-25

## 2018-07-25 DIAGNOSIS — I10 ESSENTIAL HYPERTENSION: Primary | ICD-10-CM

## 2018-07-25 LAB
ALBUMIN SERPL-MCNC: 3.2 G/DL (ref 3.6–4.8)
ALBUMIN/GLOB SERPL: 0.9 {RATIO} (ref 1.2–2.2)
ALP SERPL-CCNC: 176 IU/L (ref 39–117)
ALT SERPL-CCNC: 20 IU/L (ref 0–44)
AST SERPL-CCNC: 35 IU/L (ref 0–40)
BASOPHILS # BLD AUTO: 0 X10E3/UL (ref 0–0.2)
BASOPHILS NFR BLD AUTO: 1 %
BILIRUB SERPL-MCNC: 0.9 MG/DL (ref 0–1.2)
BUN SERPL-MCNC: 10 MG/DL (ref 8–27)
BUN/CREAT SERPL: 10 (ref 10–24)
CALCIUM SERPL-MCNC: 8.7 MG/DL (ref 8.6–10.2)
CHLORIDE SERPL-SCNC: 108 MMOL/L (ref 96–106)
CK SERPL-CCNC: 90 U/L (ref 24–204)
CO2 SERPL-SCNC: 21 MMOL/L (ref 20–29)
CREAT SERPL-MCNC: 1.02 MG/DL (ref 0.76–1.27)
EOSINOPHIL # BLD AUTO: 0.1 X10E3/UL (ref 0–0.4)
EOSINOPHIL NFR BLD AUTO: 3 %
ERYTHROCYTE [DISTWIDTH] IN BLOOD BY AUTOMATED COUNT: 15.5 % (ref 12.3–15.4)
GLOBULIN SER CALC-MCNC: 3.5 G/DL (ref 1.5–4.5)
GLUCOSE SERPL-MCNC: 121 MG/DL (ref 65–99)
HCT VFR BLD AUTO: 39.8 % (ref 37.5–51)
HGB BLD-MCNC: 13.3 G/DL (ref 13–17.7)
IMM GRANULOCYTES # BLD: 0 X10E3/UL (ref 0–0.1)
IMM GRANULOCYTES NFR BLD: 0 %
LYMPHOCYTES # BLD AUTO: 1 X10E3/UL (ref 0.7–3.1)
LYMPHOCYTES NFR BLD AUTO: 22 %
MCH RBC QN AUTO: 33.4 PG (ref 26.6–33)
MCHC RBC AUTO-ENTMCNC: 33.4 G/DL (ref 31.5–35.7)
MCV RBC AUTO: 100 FL (ref 79–97)
MONOCYTES # BLD AUTO: 0.5 X10E3/UL (ref 0.1–0.9)
MONOCYTES NFR BLD AUTO: 12 %
MORPHOLOGY BLD-IMP: ABNORMAL
NEUTROPHILS # BLD AUTO: 2.8 X10E3/UL (ref 1.4–7)
NEUTROPHILS NFR BLD AUTO: 62 %
PLATELET # BLD AUTO: 87 X10E3/UL (ref 150–379)
POTASSIUM SERPL-SCNC: 3.4 MMOL/L (ref 3.5–5.2)
PROT SERPL-MCNC: 6.7 G/DL (ref 6–8.5)
RBC # BLD AUTO: 3.98 X10E6/UL (ref 4.14–5.8)
SODIUM SERPL-SCNC: 143 MMOL/L (ref 134–144)
WBC # BLD AUTO: 4.4 X10E3/UL (ref 3.4–10.8)

## 2018-07-30 ENCOUNTER — HOSPITAL ENCOUNTER (OUTPATIENT)
Dept: CARDIAC CATH/INVASIVE PROCEDURES | Age: 68
Discharge: HOME | End: 2018-07-30
Attending: INTERNAL MEDICINE | Admitting: INTERNAL MEDICINE
Payer: MEDICARE

## 2018-07-30 DIAGNOSIS — R06.09 DOE (DYSPNEA ON EXERTION): ICD-10-CM

## 2018-07-30 DIAGNOSIS — G47.33 OBSTRUCTIVE SLEEP APNEA (ADULT) (PEDIATRIC): ICD-10-CM

## 2018-07-30 DIAGNOSIS — R94.39 ABNORMAL STRESS TEST: ICD-10-CM

## 2018-07-30 DIAGNOSIS — J43.9 PULMONARY EMPHYSEMA, UNSPECIFIED EMPHYSEMA TYPE (HCC): ICD-10-CM

## 2018-07-30 DIAGNOSIS — I10 ESSENTIAL HYPERTENSION: ICD-10-CM

## 2018-07-30 DIAGNOSIS — E78.00 HIGH CHOLESTEROL: ICD-10-CM

## 2018-07-30 DIAGNOSIS — E11.21 TYPE 2 DIABETES MELLITUS WITH NEPHROPATHY (HCC): ICD-10-CM

## 2018-07-30 PROBLEM — I20.9 ANGINA, CLASS III (HCC): Status: ACTIVE | Noted: 2018-07-30

## 2018-07-30 LAB
GLUCOSE BLD STRIP.AUTO-MCNC: 94 MG/DL (ref 65–100)
SERVICE CMNT-IMP: NORMAL

## 2018-07-30 PROCEDURE — 77030004549 HC CATH ANGI DX PRF MRTM -A

## 2018-07-30 PROCEDURE — 74011250637 HC RX REV CODE- 250/637: Performed by: INTERNAL MEDICINE

## 2018-07-30 PROCEDURE — 77030010221 HC SPLNT WR POS TELE -B

## 2018-07-30 PROCEDURE — 99152 MOD SED SAME PHYS/QHP 5/>YRS: CPT

## 2018-07-30 PROCEDURE — 77030019569 HC BND COMPR RAD TERU -B

## 2018-07-30 PROCEDURE — 77030019698 HC SYR ANGI MDLON MRTM -A

## 2018-07-30 PROCEDURE — 74011636320 HC RX REV CODE- 636/320

## 2018-07-30 PROCEDURE — C1769 GUIDE WIRE: HCPCS

## 2018-07-30 PROCEDURE — 74011250636 HC RX REV CODE- 250/636

## 2018-07-30 PROCEDURE — 77030008543 HC TBNG MON PRSS MRTM -A

## 2018-07-30 PROCEDURE — 82962 GLUCOSE BLOOD TEST: CPT

## 2018-07-30 PROCEDURE — C1894 INTRO/SHEATH, NON-LASER: HCPCS

## 2018-07-30 PROCEDURE — 74011000250 HC RX REV CODE- 250

## 2018-07-30 PROCEDURE — 74011250636 HC RX REV CODE- 250/636: Performed by: INTERNAL MEDICINE

## 2018-07-30 PROCEDURE — 77030015766

## 2018-07-30 RX ORDER — FENTANYL CITRATE 50 UG/ML
25-50 INJECTION, SOLUTION INTRAMUSCULAR; INTRAVENOUS
Status: DISCONTINUED | OUTPATIENT
Start: 2018-07-30 | End: 2018-07-30 | Stop reason: ALTCHOICE

## 2018-07-30 RX ORDER — MIDAZOLAM HYDROCHLORIDE 1 MG/ML
.5-2 INJECTION, SOLUTION INTRAMUSCULAR; INTRAVENOUS
Status: DISCONTINUED | OUTPATIENT
Start: 2018-07-30 | End: 2018-07-30 | Stop reason: ALTCHOICE

## 2018-07-30 RX ORDER — HEPARIN SODIUM 200 [USP'U]/100ML
500 INJECTION, SOLUTION INTRAVENOUS ONCE
Status: COMPLETED | OUTPATIENT
Start: 2018-07-30 | End: 2018-07-30

## 2018-07-30 RX ORDER — HEPARIN SODIUM 1000 [USP'U]/ML
INJECTION, SOLUTION INTRAVENOUS; SUBCUTANEOUS
Status: COMPLETED
Start: 2018-07-30 | End: 2018-07-30

## 2018-07-30 RX ORDER — VERAPAMIL HYDROCHLORIDE 2.5 MG/ML
2.5 INJECTION, SOLUTION INTRAVENOUS ONCE
Status: COMPLETED | OUTPATIENT
Start: 2018-07-30 | End: 2018-07-30

## 2018-07-30 RX ORDER — VERAPAMIL HYDROCHLORIDE 2.5 MG/ML
INJECTION, SOLUTION INTRAVENOUS
Status: COMPLETED
Start: 2018-07-30 | End: 2018-07-30

## 2018-07-30 RX ORDER — METFORMIN HYDROCHLORIDE 500 MG/1
TABLET, EXTENDED RELEASE ORAL
Qty: 30 TAB | Refills: 0 | OUTPATIENT
Start: 2018-07-30 | End: 2018-08-08 | Stop reason: SDUPTHER

## 2018-07-30 RX ORDER — HEPARIN SODIUM 200 [USP'U]/100ML
INJECTION, SOLUTION INTRAVENOUS
Status: COMPLETED
Start: 2018-07-30 | End: 2018-07-30

## 2018-07-30 RX ORDER — GUAIFENESIN 100 MG/5ML
81 LIQUID (ML) ORAL ONCE
Status: COMPLETED | OUTPATIENT
Start: 2018-07-30 | End: 2018-07-30

## 2018-07-30 RX ORDER — MIDAZOLAM HYDROCHLORIDE 1 MG/ML
INJECTION, SOLUTION INTRAMUSCULAR; INTRAVENOUS
Status: COMPLETED
Start: 2018-07-30 | End: 2018-07-30

## 2018-07-30 RX ORDER — GUAIFENESIN 100 MG/5ML
LIQUID (ML) ORAL
Status: DISCONTINUED
Start: 2018-07-30 | End: 2018-07-31 | Stop reason: HOSPADM

## 2018-07-30 RX ORDER — FENTANYL CITRATE 50 UG/ML
INJECTION, SOLUTION INTRAMUSCULAR; INTRAVENOUS
Status: COMPLETED
Start: 2018-07-30 | End: 2018-07-30

## 2018-07-30 RX ORDER — LIDOCAINE HYDROCHLORIDE 10 MG/ML
INJECTION, SOLUTION EPIDURAL; INFILTRATION; INTRACAUDAL; PERINEURAL
Status: COMPLETED
Start: 2018-07-30 | End: 2018-07-30

## 2018-07-30 RX ORDER — HEPARIN SODIUM 1000 [USP'U]/ML
2500 INJECTION, SOLUTION INTRAVENOUS; SUBCUTANEOUS ONCE
Status: COMPLETED | OUTPATIENT
Start: 2018-07-30 | End: 2018-07-30

## 2018-07-30 RX ORDER — SODIUM CHLORIDE 9 MG/ML
100 INJECTION, SOLUTION INTRAVENOUS CONTINUOUS
Status: DISCONTINUED | OUTPATIENT
Start: 2018-07-30 | End: 2018-07-31 | Stop reason: HOSPADM

## 2018-07-30 RX ORDER — LIDOCAINE HYDROCHLORIDE 10 MG/ML
1-30 INJECTION, SOLUTION EPIDURAL; INFILTRATION; INTRACAUDAL; PERINEURAL
Status: DISCONTINUED | OUTPATIENT
Start: 2018-07-30 | End: 2018-07-30 | Stop reason: ALTCHOICE

## 2018-07-30 RX ADMIN — LIDOCAINE HYDROCHLORIDE 1.5 ML: 10 INJECTION, SOLUTION EPIDURAL; INFILTRATION; INTRACAUDAL; PERINEURAL at 16:46

## 2018-07-30 RX ADMIN — MIDAZOLAM HYDROCHLORIDE 1 MG: 1 INJECTION, SOLUTION INTRAMUSCULAR; INTRAVENOUS at 16:45

## 2018-07-30 RX ADMIN — IOPAMIDOL 90 ML: 755 INJECTION, SOLUTION INTRAVENOUS at 16:58

## 2018-07-30 RX ADMIN — HEPARIN SODIUM 2500 UNITS: 1000 INJECTION, SOLUTION INTRAVENOUS; SUBCUTANEOUS at 16:47

## 2018-07-30 RX ADMIN — VERAPAMIL HYDROCHLORIDE 2.5 MG: 2.5 INJECTION, SOLUTION INTRAVENOUS at 16:46

## 2018-07-30 RX ADMIN — MIDAZOLAM HYDROCHLORIDE 2 MG: 1 INJECTION, SOLUTION INTRAMUSCULAR; INTRAVENOUS at 16:32

## 2018-07-30 RX ADMIN — HEPARIN SODIUM 1000 UNITS: 200 INJECTION, SOLUTION INTRAVENOUS at 16:32

## 2018-07-30 RX ADMIN — FENTANYL CITRATE 25 MCG: 50 INJECTION, SOLUTION INTRAMUSCULAR; INTRAVENOUS at 16:45

## 2018-07-30 RX ADMIN — MIDAZOLAM 2 MG: 1 INJECTION INTRAMUSCULAR; INTRAVENOUS at 16:32

## 2018-07-30 RX ADMIN — FENTANYL CITRATE 25 MCG: 50 INJECTION, SOLUTION INTRAMUSCULAR; INTRAVENOUS at 16:32

## 2018-07-30 RX ADMIN — VERAPAMIL HYDROCHLORIDE 2.5 MG: 2.5 INJECTION INTRAVENOUS at 16:46

## 2018-07-30 RX ADMIN — NITROGLYCERIN 200 MCG: 5 INJECTION, SOLUTION INTRAVENOUS at 16:48

## 2018-07-30 RX ADMIN — HEPARIN SODIUM 1000 UNITS: 200 INJECTION, SOLUTION INTRAVENOUS at 16:33

## 2018-07-30 RX ADMIN — ASPIRIN 81 MG 81 MG: 81 TABLET ORAL at 16:38

## 2018-07-30 RX ADMIN — SODIUM CHLORIDE 100 ML/HR: 900 INJECTION, SOLUTION INTRAVENOUS at 17:39

## 2018-07-30 NOTE — PROGRESS NOTES
Bedside and Verbal shift change report given to Julia (oncoming nurse) by Sebas Haley (offgoing nurse). Report included the following information SBAR, Kardex, Intake/Output, MAR, Accordion and Recent Results.

## 2018-07-30 NOTE — IP AVS SNAPSHOT
[address] [facility] 
[phone] Patient: Juarez Rios MRN: [id number] WUO:6/61/0783 About your hospitalization You were admitted on:  July 30, 2018 You last received care in the:  [facility] 2 INTRNTNL CARDIO You were discharged on:  July 30, 2018 Why you were hospitalized Your primary diagnosis was:  Not on File Your diagnoses also included:  Essential Hypertension Follow-up Information Follow up With Details Comments Contact Info Sonia Oro NP   [address] [facility] 
[phone] Your Scheduled Appointments Tuesday August 14, 2018  9:00 AM EDT  
ESTABLISHED PATIENT with Allie Centeno MD  
[facility] [address] [place] [address]) [address] [facility]  
[phone] Discharge Orders None A check ashley indicates which time of day the medication should be taken. My Medications CHANGE how you take these medications Instructions Each Dose to Equal  
 Morning Noon Evening Bedtime  
 metFORMIN  mg tablet Commonly known as:  GLUCOPHAGE XR What changed:  See the new instructions. Your last dose was: Your next dose is: TAKE ONE TABLET BY MOUTH ONE TIME DAILY  Resume on Wednesday 08/01/2018. CONTINUE taking these medications Instructions Each Dose to Equal  
 Morning Noon Evening Bedtime ADVAIR DISKUS 500-50 mcg/dose diskus inhaler Generic drug:  fluticasone-salmeterol Your last dose was: Your next dose is: Take 1 Puff by inhalation every twelve (12) hours. 1 Puff  
    
   
   
   
  
 albuterol 2.5 mg /3 mL (0.083 %) nebulizer solution Commonly known as:  PROVENTIL VENTOLIN Your last dose was: Your next dose is: 3 mL by Nebulization route every four (4) hours as needed for Wheezing. 2.5 mg  
    
   
   
   
  
 ipratropium 0.02 % Soln Commonly known as:  ATROVENT Your last dose was: Your next dose is:    
   
   
 2.5 mL by Nebulization route four (4) times daily as needed. 0.5 mg  
    
   
   
   
  
 levothyroxine 88 mcg tablet Commonly known as:  SYNTHROID Your last dose was: Your next dose is: TAKE ONE TABLET BY MOUTH EVERY MORNING BEFORE BREAKFAST  
     
   
   
   
  
 losartan 100 mg tablet Commonly known as:  COZAAR Your last dose was: Your next dose is: Take 1 Tab by mouth daily. 100 mg  
    
   
   
   
  
 lovastatin 20 mg tablet Commonly known as:  MEVACOR Your last dose was: Your next dose is: Take 1 Tab by mouth nightly. 20 mg  
    
   
   
   
  
 metoprolol tartrate 50 mg tablet Commonly known as:  LOPRESSOR Your last dose was: Your next dose is: TAKE ONE TABLET BY MOUTH TWICE A DAY  
     
   
   
   
  
 montelukast 10 mg tablet Commonly known as:  SINGULAIR Your last dose was: Your next dose is: Take 1 Tab by mouth daily. 10 mg  
    
   
   
   
  
 terazosin 1 mg capsule Commonly known as:  HYTRIN Your last dose was: Your next dose is: TAKE ONE CAPSULE BY MOUTH ONCE NIGHTLY VIAGRA 100 mg tablet Generic drug:  sildenafil citrate Your last dose was: Your next dose is: Take 1 Tab by mouth as needed. 100 mg Where to Get Your Medications Information on where to get these meds will be given to you by the nurse or doctor. ! Ask your nurse or doctor about these medications  
  metFORMIN  mg tablet Discharge Instructions Patient ID: 
Nancy Benja [id number] y.o. 
1950 Admit Date: 7/30/2018 Discharge Date: 7/30/2018 Admitting Physician: Abril Thorne MD  
 
Discharge Physician: Abril Thorne MD 
 
Admission Diagnoses: Essential hypertension Abelino Encinas Type 2 diabetes mellitus with nephropathy ([facility] [E11.21] Pulmonary emphysema, unspecified emphysema type ([facility] [address] [J43.9] Obstructive sleep apnea (adult) (pediatric) [G47.33] High cholesterol [E78.00] PERRY (dyspnea on exertion) [R06.09] Abnormal stress test [R94.39] Discharge Diagnoses: Active Problems: 
  Essential hypertension (7/1/2015) Angina, class III ([facility] [address] (7/30/2018) Abnormal stress test (7/30/2018) Discharge Condition: Good Cardiology Procedures this Admission:  Diagnostic left heart catheterization Disposition: home Patient Instructions:  
 
 
Reference discharge instructions provided by nursing for diet and activity. Follow-up with me in 6 wks. Signed: Abril Thorne MD 
7/30/2018 
5:02 PM 
 
 
Radial Cardiac Catheterization/Angiography Discharge Instructions It is normal to feel tired the first couple days. Take it easy and follow the physicians instructions. CHECK THE CATHETER INSERTION SITE DAILY: 
 
Remove the wrist dressing 24 hours after the procedure. You may shower 24 hours after the procedure. Wash with soap and water and pat dry. Gentle cleaning of the site with soap and water is sufficient, cover with a dry clean dressing or bandage. Do not apply creams or powders to the area. No soaking the wrist for 3 days. Leave the puncture site open to air after 24 hours post-procedure. CALL THE PHYSICIANS:  
 
If the site becomes red, swollen or feels warm to the touch If there is bleeding or drainage or if there is unusual pain at the radial site. If there is any minor oozing, you may apply a band-aid and remove after 12 hours.   
If the bleeding continues, hold pressure with the middle finger against the puncture site and the thumb against the back of the wrist,call 911 to be transported to the hospital. 
DO  [place] [address] ACTIVITY:  
For the first 24 hours do not manipulate the wrist. 
No lifting, pushing or pulling over 3-5 pounds with the affected wrist for 7 daysand no straining the insertion site. Do not life grocery bags or the garbage can, do not run the vacuum  or  for 7 days. Start with short walks as in the hospital and gradually increase as tolerated each day. It is recommended to walk 30 minutes 5-7 days per week. Follow your physicians instructions on activity. Avoid walking outside in extremes of heat or cold. Walk inside when it is cold and windy or hot and humid. Things to keep in mind: 
No driving for at least 24 hours, or as designated by your physician. Limit the number of times you go up and down the stairs Take rests and pace yourself with activity. Be careful and do not strain with bowel movements. MEDICATIONS: 
 
Take all medications as prescribed Call your physician if you have any questions Keep an updated list of your medications with you at all times and give a list to your physician and pharmacist 
 
SIGNS AND SYMPTOMS:  
Be cautious of symptoms of angina or recurrent symptoms such as chest discomfort, unusual shortness of breath or fatigue. These could be symptoms of restenosis, a new blockage or a heart attack. If your symptoms are relieved with rest it is still recommended that you notify your physician of recurrent chest pain or discomfort. For CHEST PAIN or symptoms of angina not relieved with rest:  If the discomfort is not relieved with rest, and you have been prescribed Nitroglycerin, take as directed (taken under the tongue, one at a time 5 minutes apart for a total of 3 doses). If the discomfort is not relieved after the 3rd nitroglycerin, call 911.   If you have not been prescribed Nitroglycerin  and your chest discomfort is not relieved with rest, call [phone]. AFTER CARE:  
Follow up with your physician as instructed. Follow a heart healthy diet with proper portion control, daily stress management, daily exercise, blood pressure and cholesterol control , and smoking cessation. ACO Transitions of Care Introducing [employer] [address] offers a voluntary care coordination program to provide high quality service and care to [facility] fee-for-service beneficiaries. Sam Mcginnis was designed to help you enhance your health and well-being through the following services: ? Transitions of Care  support for individuals who are transitioning from one care setting to another (example: Hospital to home). ? Chronic and Complex Care Coordination  support for individuals and caregivers of those with serious or chronic illnesses or with more than one chronic (ongoing) condition and those who take a number of different medications. If you meet specific medical criteria, a [facility] may call you directly to coordinate your care with your primary care physician and your other care providers. For questions about the [facility] programs, please, contact your physicians office. For general questions or additional information about Accountable Care Organizations: 
Please visit www.medicare.gov/acos. html or call 1-800-MEDICARE (8-980.496.1049) TTY users should call 6-328.624.8686. Introducing hospitals & HEALTH SERVICES! [facility] introduces Z[employer]a Fitness patient portal. Now you can access parts of your medical record, email your doctor's office, and request medication refills online. 1. In your internet browser, go to [email or web address] 2. Click on the First Time User? Click Here link in the Sign In box. You will see the New Member Sign Up page. 3. Enter your Care at Hand Access Code exactly as it appears below. You will not need to use this code after youve completed the sign-up process. If you do not sign up before the expiration date, you must request a new code. · Care at Hand Access Code: [email or web address] Expires: 8/6/2018  9:37 AM 
 
4. Enter the last four digits of your Social Security Number (xxxx) and Date of Birth (mm/dd/yyyy) as indicated and click Submit. You will be taken to the next sign-up page. 5. Create a Care at Hand ID. This will be your Care at Hand login ID and cannot be changed, so think of one that is secure and easy to remember. 6. Create a Care at Hand password. You can change your password at any time. 7. Enter your Password Reset Question and Answer. This can be used at a later time if you forget your password. 8. Enter your e-mail address. You will receive e-mail notification when new information is available in [address] 9. Click Sign Up. You can now view and download portions of your medical record. 10. Click the Download Summary menu link to download a portable copy of your medical information. If you have questions, please visit the Frequently Asked Questions section of the Care at Hand website. Remember, Care at Hand is NOT to be used for urgent needs. For medical emergencies, dial 911. Now available from your iPhone and Android! Introducing Matteo Jett As a Mariana Campos patient, I wanted to make you aware of our electronic visit tool called Matteo Figueroanubiatiff. [facility] Campos 24/7 allows you to connect within minutes with a medical provider 24 hours a day, seven days a week via a mobile device or tablet or logging into a secure website from your computer. You can access Matteo Jett from anywhere in the [place].  
 
A virtual visit might be right for you when you have a simple condition and feel like you just dont want to get out of bed, or cant get away from work for an appointment, when your regular [facility] provider is not available (evenings, weekends or holidays), or when youre out of town and need minor care. Electronic visits cost only $49 and if the Douglas Gupta [employer] [facility] 24/7 provider determines a prescription is needed to treat your condition, one can be electronically transmitted to a nearby pharmacy*. Please take a moment to enroll today if you have not already done so. The enrollment process is free and takes just a few minutes. To enroll, please download the scanR/SphynK[employer] liborio to your tablet or phone, or visit [email or web address] to enroll on your computer. And, as an [address] [place] [address] patient with a OATSystems account, the results of your visits will be scanned into your electronic medical record and your primary care provider will be able to view the scanned results. We urge you to continue to see your regular [facility] provider for your ongoing medical care. And while your primary care provider may not be the one available when you seek a [email or web address] virtual visit, the peace of mind you get from getting a real diagnosis real time can be priceless. For more information on [email or web address], view our Frequently Asked Questions (FAQs) at [email or web address]. Sincerely, 
 
Leslie Cruz MD 
Chief Medical Officer Gilda Ser[facility] Rodriguez *:  certain medications cannot be prescribed via [email or web address] Providers Seen During Your Hospitalization Provider Specialty Primary office phone Adam Cade MD Cardiology [phone] Your Primary Care Physician (PCP) Primary Care Physician Office Phone Office Fax Saritha Wakefield [phone] You are allergic to the following Allergen Reactions Bactrim (Sulfamethoprim Ds) Diarrhea Nausea and Vomiting After on 1st dose. Morphine Unknown (comments) Recent Documentation Height Weight BMI Smoking Status 1.753 m 109.8 kg 35.74 kg/m2 Former Smoker Emergency Contacts Name Discharge Info Relation Home Work Mobile Jen Brennan DISCHARGE CAREGIVER [3] Spouse [3] [phone] Maine Smith DISCHARGE CAREGIVER [3] Other Relative [6]   [phone] Patient Belongings The following personal items are in your possession at time of discharge: 
     Visual Aid: None      Home Medications: Kept at bedside   Jewelry: None  Clothing: With patient, Undergarments, Pants, Footwear, Socks, Shirt    Other Valuables: Cell Phone Please provide this summary of care documentation to your next provider. Signatures-by signing, you are acknowledging that this After Visit Summary has been reviewed with you and you have received a copy. Patient Signature:  ____________________________________________________________ Date:  ____________________________________________________________  
  
Stan Henry Provider Signature:  ____________________________________________________________ Date:  ____________________________________________________________

## 2018-07-30 NOTE — PROGRESS NOTES
Spoke to patient's wife, Elizabeth Rose on HIPAA using 2 identifiers.   Per Ayaan Bonilla NP, she was made aware that echocardiogram is normal.

## 2018-07-30 NOTE — IP AVS SNAPSHOT
[address] [facility] 
[phone] Patient: Corrine Travis MRN: [id number] KLF:[birth year] A check ashley indicates which time of day the medication should be taken. My Medications CHANGE how you take these medications Instructions Each Dose to Equal  
 Morning Noon Evening Bedtime  
 metFORMIN  mg tablet Commonly known as:  GLUCOPHAGE XR What changed:  See the new instructions. Your last dose was: Your next dose is: TAKE ONE TABLET BY MOUTH ONE TIME DAILY  Resume on Wednesday 08/01/2018. CONTINUE taking these medications Instructions Each Dose to Equal  
 Morning Noon Evening Bedtime ADVAIR DISKUS 500-50 mcg/dose diskus inhaler Generic drug:  fluticasone-salmeterol Your last dose was: Your next dose is: Take 1 Puff by inhalation every twelve (12) hours. 1 Puff  
    
   
   
   
  
 albuterol 2.5 mg /3 mL (0.083 %) nebulizer solution Commonly known as:  PROVENTIL VENTOLIN Your last dose was: Your next dose is:    
   
   
 3 mL by Nebulization route every four (4) hours as needed for Wheezing. 2.5 mg  
    
   
   
   
  
 ipratropium 0.02 % Soln Commonly known as:  ATROVENT Your last dose was: Your next dose is:    
   
   
 2.5 mL by Nebulization route four (4) times daily as needed. 0.5 mg  
    
   
   
   
  
 levothyroxine 88 mcg tablet Commonly known as:  SYNTHROID Your last dose was: Your next dose is: TAKE ONE TABLET BY MOUTH EVERY MORNING BEFORE BREAKFAST  
     
   
   
   
  
 losartan 100 mg tablet Commonly known as:  COZAAR Your last dose was: Your next dose is: Take 1 Tab by mouth daily. 100 mg  
    
   
   
   
  
 lovastatin 20 mg tablet Commonly known as:  MEVACOR Your last dose was: Your next dose is: Take 1 Tab by mouth nightly. 20 mg  
    
   
   
   
  
 metoprolol tartrate 50 mg tablet Commonly known as:  LOPRESSOR Your last dose was: Your next dose is: TAKE ONE TABLET BY MOUTH TWICE A DAY  
     
   
   
   
  
 montelukast 10 mg tablet Commonly known as:  SINGULAIR Your last dose was: Your next dose is: Take 1 Tab by mouth daily. 10 mg  
    
   
   
   
  
 terazosin 1 mg capsule Commonly known as:  HYTRIN Your last dose was: Your next dose is: TAKE ONE CAPSULE BY MOUTH ONCE NIGHTLY VIAGRA 100 mg tablet Generic drug:  sildenafil citrate Your last dose was: Your next dose is: Take 1 Tab by mouth as needed. 100 mg Where to Get Your Medications Information on where to get these meds will be given to you by the nurse or doctor. ! Ask your nurse or doctor about these medications  
  metFORMIN  mg tablet

## 2018-07-31 VITALS
TEMPERATURE: 98.1 F | BODY MASS INDEX: 35.84 KG/M2 | DIASTOLIC BLOOD PRESSURE: 80 MMHG | OXYGEN SATURATION: 94 % | WEIGHT: 242 LBS | HEIGHT: 69 IN | SYSTOLIC BLOOD PRESSURE: 136 MMHG | HEART RATE: 72 BPM | RESPIRATION RATE: 16 BRPM

## 2018-07-31 NOTE — DISCHARGE INSTRUCTIONS
Patient ID:  Lizy Waters  [id number]  79 y.o.  1950    Admit Date: 7/30/2018    Discharge Date: 7/30/2018     Admitting Physician: Silverio James MD     Discharge Physician: Silverio James MD    Admission Diagnoses: Essential hypertension [I10]  Type 2 diabetes mellitus with nephropathy ([facility] [address] [E11.21]  Pulmonary emphysema, unspecified emphysema type ([facility] [address] [J43.9]  Obstructive sleep apnea (adult) (pediatric) [G47.33]  High cholesterol [E78.00]  PERRY (dyspnea on exertion) [R06.09]  Abnormal stress test [R94.39]    Discharge Diagnoses: Active Problems:    Essential hypertension (7/1/2015)      Angina, class III ([facility] [address] (7/30/2018)      Abnormal stress test (7/30/2018)        Discharge Condition: Good    Cardiology Procedures this Admission:  Diagnostic left heart catheterization    Disposition: home    Patient Instructions:       Reference discharge instructions provided by nursing for diet and activity. Follow-up with me in 6 wks. Signed: Silverio James MD  7/30/2018  5:02 PM      Radial Cardiac Catheterization/Angiography Discharge Instructions    It is normal to feel tired the first couple days. Take it easy and follow the physicians instructions. CHECK THE CATHETER INSERTION SITE DAILY:    Remove the wrist dressing 24 hours after the procedure. You may shower 24 hours after the procedure. Wash with soap and water and pat dry. Gentle cleaning of the site with soap and water is sufficient, cover with a dry clean dressing or bandage. Do not apply creams or powders to the area. No soaking the wrist for 3 days. Leave the puncture site open to air after 24 hours post-procedure. CALL THE PHYSICIANS:     If the site becomes red, swollen or feels warm to the touch  If there is bleeding or drainage or if there is unusual pain at the radial site. If there is any minor oozing, you may apply a band-aid and remove after 12 hours.    If the bleeding continues, hold pressure with the middle finger against the puncture site and the thumb against the back of the wrist,call 911 to be transported to the hospital.  DO NOT DRIVE YOURSELF, [phone]. ACTIVITY:   For the first 24 hours do not manipulate the wrist.  No lifting, pushing or pulling over 3-5 pounds with the affected wrist for 7 daysand no straining the insertion site. Do not life grocery bags or the garbage can, do not run the vacuum  or  for 7 days. Start with short walks as in the hospital and gradually increase as tolerated each day. It is recommended to walk 30 minutes 5-7 days per week. Follow your physicians instructions on activity. Avoid walking outside in extremes of heat or cold. Walk inside when it is cold and windy or hot and humid. Things to keep in mind:  No driving for at least 24 hours, or as designated by your physician. Limit the number of times you go up and down the stairs  Take rests and pace yourself with activity. Be careful and do not strain with bowel movements. MEDICATIONS:    Take all medications as prescribed  Call your physician if you have any questions  Keep an updated list of your medications with you at all times and give a list to your physician and pharmacist    SIGNS AND SYMPTOMS:   Be cautious of symptoms of angina or recurrent symptoms such as chest discomfort, unusual shortness of breath or fatigue. These could be symptoms of restenosis, a new blockage or a heart attack. If your symptoms are relieved with rest it is still recommended that you notify your physician of recurrent chest pain or discomfort. For CHEST PAIN or symptoms of angina not relieved with rest:  If the discomfort is not relieved with rest, and you have been prescribed Nitroglycerin, take as directed (taken under the tongue, one at a time 5 minutes apart for a total of 3 doses).  If the discomfort is not relieved after the 3rd nitroglycerin, call 911. If you have not been prescribed Nitroglycerin  and your chest discomfort is not relieved with rest, call 911. AFTER CARE:   Follow up with your physician as instructed. Follow a heart healthy diet with proper portion control, daily stress management, daily exercise, blood pressure and cholesterol control , and smoking cessation.

## 2018-08-08 DIAGNOSIS — E11.8 TYPE 2 DIABETES MELLITUS WITH COMPLICATION, WITHOUT LONG-TERM CURRENT USE OF INSULIN (HCC): Primary | ICD-10-CM

## 2018-08-08 RX ORDER — METFORMIN HYDROCHLORIDE 500 MG/1
TABLET, EXTENDED RELEASE ORAL
Qty: 90 TAB | Refills: 0 | Status: SHIPPED | OUTPATIENT
Start: 2018-08-08 | End: 2018-08-08 | Stop reason: SDUPTHER

## 2018-08-08 RX ORDER — METFORMIN HYDROCHLORIDE 500 MG/1
TABLET, EXTENDED RELEASE ORAL
Qty: 90 TAB | Refills: 0 | Status: SHIPPED | OUTPATIENT
Start: 2018-08-08 | End: 2018-09-10 | Stop reason: SDUPTHER

## 2018-08-08 NOTE — TELEPHONE ENCOUNTER
Received refill request for Metormin. Pt was seen in July, told to return in 6-8 weeks, after Cardio consult.  Refilling for 90 days

## 2018-08-14 ENCOUNTER — OFFICE VISIT (OUTPATIENT)
Dept: CARDIOLOGY CLINIC | Age: 68
End: 2018-08-14

## 2018-08-14 VITALS
SYSTOLIC BLOOD PRESSURE: 140 MMHG | OXYGEN SATURATION: 97 % | BODY MASS INDEX: 37.5 KG/M2 | WEIGHT: 253.2 LBS | RESPIRATION RATE: 16 BRPM | HEART RATE: 70 BPM | HEIGHT: 69 IN | DIASTOLIC BLOOD PRESSURE: 80 MMHG

## 2018-08-14 DIAGNOSIS — I10 ESSENTIAL HYPERTENSION: Primary | ICD-10-CM

## 2018-08-14 DIAGNOSIS — G47.33 OBSTRUCTIVE SLEEP APNEA (ADULT) (PEDIATRIC): ICD-10-CM

## 2018-08-14 DIAGNOSIS — M79.89 LEG SWELLING: ICD-10-CM

## 2018-08-14 DIAGNOSIS — E78.2 MIXED HYPERLIPIDEMIA: ICD-10-CM

## 2018-08-14 RX ORDER — ASPIRIN 81 MG/1
TABLET ORAL DAILY
COMMUNITY

## 2018-08-14 NOTE — PROGRESS NOTES
1. Have you been to the ER, urgent care clinic since your last visit? Hospitalized since your last visit? Yes When: 7/2018 [facility]  2. Have you seen or consulted any other health care providers outside of the [address] [place] [address] since your last visit? Include any pap smears or colon screening. No     Patient has no cardiac complaints has gained 10 lbs since last visit due to wife's cooking.

## 2018-08-14 NOTE — PROGRESS NOTES
8/14/2018 9:36 AM      Subjective:     Austin Porter   denies chest pain, chest pressure/discomfort, dyspnea, palpitations, irregular heart beats, near-syncope, syncope, fatigue, orthopnea, paroxysmal nocturnal dyspnea, exertional chest pressure/discomfort, claudication, lower extremity edema, tachypnea. Visit Vitals    /80 (BP 1 Location: Right arm, BP Patient Position: Sitting)    Pulse 70    Resp 16    Ht 5' 9\" (1.753 m)    Wt 253 lb 3.2 oz (114.9 kg)    SpO2 97%    BMI 37.39 kg/m2     Current Outpatient Prescriptions   Medication Sig    aspirin delayed-release 81 mg tablet Take  by mouth daily.  metFORMIN ER (GLUCOPHAGE XR) 500 mg tablet TAKE ONE TABLET BY MOUTH ONE TIME DAILY .  losartan (COZAAR) 100 mg tablet TAKE ONE TABLET BY MOUTH DAILY    terazosin (HYTRIN) 1 mg capsule TAKE ONE CAPSULE BY MOUTH ONCE NIGHTLY    metoprolol tartrate (LOPRESSOR) 50 mg tablet TAKE ONE TABLET BY MOUTH TWICE A DAY    montelukast (SINGULAIR) 10 mg tablet Take 1 Tab by mouth daily.  albuterol (PROVENTIL VENTOLIN) 2.5 mg /3 mL (0.083 %) nebulizer solution 3 mL by Nebulization route every four (4) hours as needed for Wheezing.  ipratropium (ATROVENT) 0.02 % soln 2.5 mL by Nebulization route four (4) times daily as needed.  VIAGRA 100 mg tablet Take 1 Tab by mouth as needed.  lovastatin (MEVACOR) 20 mg tablet Take 1 Tab by mouth nightly.  levothyroxine (SYNTHROID) 88 mcg tablet TAKE ONE TABLET BY MOUTH EVERY MORNING BEFORE BREAKFAST    fluticasone-salmeterol (ADVAIR DISKUS) 500-50 mcg/dose diskus inhaler Take 1 Puff by inhalation every twelve (12) hours. No current facility-administered medications for this visit.           Objective:      Visit Vitals    /80 (BP 1 Location: Right arm, BP Patient Position: Sitting)    Pulse 70    Resp 16    Ht 5' 9\" (1.753 m)    Wt 253 lb 3.2 oz (114.9 kg)    SpO2 97%    BMI 37.39 kg/m2       Data Review: EKG: Normal sinus rhythm, no acute st/t changes, 1st degree AV block    Reviewed and/or ordered active problem list, medication list tests    Past Medical History:   Diagnosis Date    Blurred vision     Cirrhosis, alcoholic [facility] [address]     COPD bronchitis     Diabetes mellitus ([facility] [address]     Ear discomfort     History of ETOH abuse     HTN (hypertension)     SOB (shortness of breath)     Thrombocytopenia ([facility] [address]     Trouble in sleeping       Past Surgical History:   Procedure Laterality Date    HX CATARACT REMOVAL Right 2007    HX CATARACT REMOVAL Left 2012    HX CIRCUMCISION      at 24 y/o    HX HERNIA REPAIR  as a youth    right side    HX SHOULDER ARTHROSCOPY      left x 3 for torn rotator cuff muscle     Allergies   Allergen Reactions    Bactrim [Sulfamethoprim Ds] Diarrhea and Nausea and Vomiting     After on 1st dose.  Morphine Unknown (comments)     itching      Family History   Problem Relation Age of Onset    Diabetes Neg Hx     Hypertension Father     Heart Disease Father      pacemaker,  of MI @ 80    Hypertension Mother     Cancer Mother      breast with spread to bones      Social History     Social History    Marital status:      Spouse name: N/A    Number of children: N/A    Years of education: N/A     Occupational History    Not on file. Social History Main Topics    Smoking status: Former Smoker     Types: Cigarettes     Quit date: 2014    Smokeless tobacco: Former User      Comment: uses vap    Alcohol use No      Comment: Quit Oct 21, 1985    Drug use: No    Sexual activity: Not on file     Other Topics Concern    Not on file     Social History Narrative         Review of Systems     General: Not Present- Anorexia, Chills, Dietary Changes, Fatigue, Fever, Medication Changes, Night Sweats, Weight Gain > 10lbs. and Weight Loss > 10lbs. .  Skin: Not Present- Bruising and Excessive Sweating.   HEENT: Not Present- Headache, Visual Loss and Vertigo. Respiratory: Not Present- Cough, Decreased Exercise Tolerance, Difficulty Breathing, Snoring and Wheezing. Cardiovascular: Not Present- Abnormal Blood Pressure, Chest Pain, Claudications, Difficulty Breathing On Exertion, Fainting / Blacking Out, Irregular Heart Beat, Night Cramps, Orthopnea, Palpitations, Paroxysmal Nocturnal Dyspnea, Rapid Heart Rate, Shortness of Breath. Gastrointestinal: Not Present- Black, Tarry Stool, Bloody Stool, Diarrhea, Hematemesis, Rectal Bleeding and Vomiting. Musculoskeletal: Not Present- Muscle Pain and Muscle Weakness. Neurological: Not Present- Dizziness. Psychiatric: Not Present- Depression. Endocrine: Not Present- Cold Intolerance, Heat Intolerance and Thyroid Problems. Hematology: Not Present- Abnormal Bleeding, Anemia, Blood Clots and Easy Bruising.       Physical Exam   The physical exam findings are as follows:       General   Mental Status - Alert. General Appearance - Not in acute distress. Chest and Lung Exam   Inspection: Accessory muscles - No use of accessory muscles in breathing. Auscultation:   Breath sounds: - Normal.      Cardiovascular   Inspection: Jugular vein - Bilateral - Inspection Normal.  Palpation/Percussion:   Apical Impulse: - Normal.  Auscultation: Rhythm - Regular. Heart Sounds - S1 WNL and S2 WNL. No S3 or S4. Murmurs & Other Heart Sounds: Auscultation of the heart reveals - No Murmurs. Carotid arteries - No Carotid bruit. Peripheral Vascular   Upper Extremity: Inspection - Bilateral - No Cyanotic nailbeds or Digital clubbing. Lower Extremity:   Palpation: Dorsalis pedis pulse - Bilateral - Normal. Posterior tibia pulse - Bilateral - Normal. Edema - Bilateral - 1-2+ edema. Assessment:       ICD-10-CM ICD-9-CM    1. Essential hypertension I10 401.9 AMB POC EKG ROUTINE W/ 12 LEADS, INTER & REP   2. Obstructive sleep apnea (adult) (pediatric) G47.33 327.23    3. Leg swelling M79.89 729.81    4.  Mixed hyperlipidemia E78.2 272.2        Plan:     1. No significant CAD on recent cath. Continue current meds. 2. HTN: BP controlled. Continue current meds. 3. Last FLP noted. On statin. 4. Leg swelling: option venous reflux study, compression stocking and diuretic trial d/w pt. He wants to hold off for now and if worsen will call back.

## 2018-08-27 ENCOUNTER — TELEPHONE (OUTPATIENT)
Dept: FAMILY MEDICINE CLINIC | Age: 68
End: 2018-08-27

## 2018-08-27 NOTE — TELEPHONE ENCOUNTER
Telephone call received from the patient stating that he has had new muscle spasms in his hands, feet and calves off and on for about one week. He states he is prediabetic and would like to know if he can take an Epsom salt bath to help with the spasms. The package states it is not safe to use for patients with Diabetes. He denies any other symptoms or starting any new medications recently. Routing to the provider for review.  Liyah Pichardo RN

## 2018-08-29 NOTE — TELEPHONE ENCOUNTER
RTC to pt, pt advised of NP [facility] recommendations. He will do soaks of about 15 mints. He also mentions that he has been having swelling in both he legs for about 3 months. At one point he says he thought is was gong down but now they get so swollen and tight that it can be painful. He does elevated during the day and at night while he sleeping. Pt's next appt is 09/10/18/    Please advise.

## 2018-09-07 ENCOUNTER — TELEPHONE (OUTPATIENT)
Dept: FAMILY MEDICINE CLINIC | Age: 68
End: 2018-09-07

## 2018-09-07 NOTE — TELEPHONE ENCOUNTER
Patient calling today requesting if Terence Goltz. Neita Gaba can change the setting on a \"Sleep Apnea Machine\" Patient states that he was prescribed and has been using a \"sleep apnea machine since 2010\" patient tells me that he recently bought a new CPAP and the setting need to be changed. Discussed with patient that at [facility] we do not change the settings of those machines and that he will have to go the clinic/center of the specialist for sleep apnea. Patient verbalized understanding and does have any other questions at this time.   Sammie Gongora RN

## 2018-09-10 ENCOUNTER — OFFICE VISIT (OUTPATIENT)
Dept: FAMILY MEDICINE CLINIC | Age: 68
End: 2018-09-10

## 2018-09-10 ENCOUNTER — HOSPITAL ENCOUNTER (OUTPATIENT)
Dept: LAB | Age: 68
Discharge: HOME | End: 2018-09-10

## 2018-09-10 VITALS
TEMPERATURE: 97.8 F | DIASTOLIC BLOOD PRESSURE: 72 MMHG | SYSTOLIC BLOOD PRESSURE: 150 MMHG | WEIGHT: 261.4 LBS | BODY MASS INDEX: 38.6 KG/M2 | HEART RATE: 82 BPM

## 2018-09-10 DIAGNOSIS — I10 ESSENTIAL HYPERTENSION: ICD-10-CM

## 2018-09-10 DIAGNOSIS — E11.9 TYPE 2 DIABETES MELLITUS WITHOUT COMPLICATION, WITHOUT LONG-TERM CURRENT USE OF INSULIN (HCC): ICD-10-CM

## 2018-09-10 DIAGNOSIS — E03.4 HYPOTHYROIDISM DUE TO ACQUIRED ATROPHY OF THYROID: ICD-10-CM

## 2018-09-10 DIAGNOSIS — Z13.9 ENCOUNTER FOR SCREENING: ICD-10-CM

## 2018-09-10 DIAGNOSIS — E11.8 TYPE 2 DIABETES MELLITUS WITH COMPLICATION, WITHOUT LONG-TERM CURRENT USE OF INSULIN (HCC): ICD-10-CM

## 2018-09-10 DIAGNOSIS — M79.89 LEG SWELLING: ICD-10-CM

## 2018-09-10 DIAGNOSIS — M79.89 LEG SWELLING: Primary | ICD-10-CM

## 2018-09-10 DIAGNOSIS — E87.6 HYPOKALEMIA: ICD-10-CM

## 2018-09-10 LAB
ANION GAP SERPL CALC-SCNC: 7 MMOL/L (ref 5–15)
BUN SERPL-MCNC: 15 MG/DL (ref 6–20)
BUN/CREAT SERPL: 13 (ref 12–20)
CALCIUM SERPL-MCNC: 8.5 MG/DL (ref 8.5–10.1)
CHLORIDE SERPL-SCNC: 107 MMOL/L (ref 97–108)
CO2 SERPL-SCNC: 26 MMOL/L (ref 21–32)
CREAT SERPL-MCNC: 1.14 MG/DL (ref 0.7–1.3)
EST. AVERAGE GLUCOSE BLD GHB EST-MCNC: 111 MG/DL
GLUCOSE POC: NORMAL MG/DL
GLUCOSE SERPL-MCNC: 98 MG/DL (ref 65–100)
HBA1C MFR BLD: 5.5 % (ref 4.2–6.3)
POTASSIUM SERPL-SCNC: 3.6 MMOL/L (ref 3.5–5.1)
SODIUM SERPL-SCNC: 140 MMOL/L (ref 136–145)

## 2018-09-10 PROCEDURE — 83036 HEMOGLOBIN GLYCOSYLATED A1C: CPT | Performed by: NURSE PRACTITIONER

## 2018-09-10 PROCEDURE — 80048 BASIC METABOLIC PNL TOTAL CA: CPT | Performed by: NURSE PRACTITIONER

## 2018-09-10 PROCEDURE — 84443 ASSAY THYROID STIM HORMONE: CPT | Performed by: NURSE PRACTITIONER

## 2018-09-10 RX ORDER — SPIRONOLACTONE 25 MG/1
25 TABLET ORAL
Qty: 30 TAB | Refills: 1 | Status: SHIPPED | OUTPATIENT
Start: 2018-09-10 | End: 2019-01-21 | Stop reason: SDUPTHER

## 2018-09-10 RX ORDER — LOVASTATIN 20 MG/1
20 TABLET ORAL
Qty: 90 TAB | Refills: 1 | Status: SHIPPED | OUTPATIENT
Start: 2018-09-10 | End: 2019-04-17 | Stop reason: SDUPTHER

## 2018-09-10 RX ORDER — METFORMIN HYDROCHLORIDE 500 MG/1
TABLET, EXTENDED RELEASE ORAL
Qty: 90 TAB | Refills: 3 | Status: SHIPPED | OUTPATIENT
Start: 2018-09-10 | End: 2018-10-24

## 2018-09-10 RX ORDER — LOSARTAN POTASSIUM 100 MG/1
TABLET ORAL
Qty: 90 TAB | Refills: 1 | Status: SHIPPED | OUTPATIENT
Start: 2018-09-10 | End: 2019-03-26 | Stop reason: SDUPTHER

## 2018-09-10 RX ORDER — METOPROLOL TARTRATE 50 MG/1
TABLET ORAL
Qty: 180 TAB | Refills: 1 | Status: SHIPPED | OUTPATIENT
Start: 2018-09-10 | End: 2019-08-29 | Stop reason: SDUPTHER

## 2018-09-10 RX ORDER — LEVOTHYROXINE SODIUM 88 UG/1
TABLET ORAL
Qty: 90 TAB | Refills: 3 | Status: SHIPPED | OUTPATIENT
Start: 2018-09-10 | End: 2019-05-30 | Stop reason: DRUGHIGH

## 2018-09-10 NOTE — MR AVS SNAPSHOT
[address] [address] 
[phone] Patient: Humberto Grace MRN: [id number] Visit Information Date & Time Provider Department Dept. Phone Encounter #  
 9/10/2018 10:10 AM Nilesh Bolden, Shilpa Spencer [phone] [id number] Your Appointments 8/20/2019  9:00 AM  
ESTABLISHED PATIENT with Veronika Suárez MD  
[facility] [address] [place] [address]) Appt Note: annual f/u  
 [address]  
[phone] [address] Upcoming Health Maintenance Date Due COLONOSCOPY 2/19/1969 DTaP/Tdap/Td series (1 - Tdap) 2/19/1972 ZOSTER VACCINE AGE 60> 12/19/2010 EYE EXAM RETINAL OR DILATED Q1 7/1/2014 GLAUCOMA SCREENING Q2Y 2/19/2016 FOOT EXAM Q1 10/13/2016 MEDICARE YEARLY EXAM 3/14/2018 MICROALBUMIN Q1 6/21/2018 Influenza Age 5 to Adult 8/1/2018 HEMOGLOBIN A1C Q6M 9/28/2018 LIPID PANEL Q1 10/25/2018 Allergies as of 9/10/2018  Review Complete On: 9/10/2018 By: Nilesh Bolden NP Severity Noted Reaction Type Reactions Bactrim [Sulfamethoprim Ds]  12/03/2015    Diarrhea, Nausea and Vomiting After on 1st dose. Morphine  08/15/2011    Unknown (comments)  
 itching Current Immunizations  Reviewed on 10/25/2017 Name Date Influenza Vaccine 10/22/2014, 1/24/2014, 10/1/2012 Influenza Vaccine (Quad) PF 10/25/2017, 12/28/2016, 10/22/2015 Pneumococcal Polysaccharide (PPSV-23) 12/28/2016 Pneumococcal Vaccine (Unspecified Type) 1/1/2010 Not reviewed this visit You Were Diagnosed With   
  
 Codes Comments Leg swelling    -  Primary ICD-10-CM: M79.89 ICD-9-CM: 729.81 Encounter for screening     ICD-10-CM: Z13.9 ICD-9-CM: V82.9 Essential hypertension     ICD-10-CM: I10 
ICD-9-CM: 401.9 Hypokalemia     ICD-10-CM: E87.6 ICD-9-CM: 276.8 Type 2 diabetes mellitus with complication, without long-term current use of insulin (HCC)     ICD-10-CM: E11.8 ICD-9-CM: 250.90 Hypothyroidism due to acquired atrophy of thyroid     ICD-10-CM: E03.4 ICD-9-CM: 244.8, 246.8 Type 2 diabetes mellitus without complication, without long-term current use of insulin (HCC)     ICD-10-CM: E11.9 ICD-9-CM: 250.00 Vitals BP Pulse Temp Weight(growth percentile) BMI Smoking Status 150/72 (BP 1 Location: Left arm) 82 97.8 °F (36.6 °C) (Oral) 261 lb 6.4 oz (118.6 kg) 38.6 kg/m2 Former Smoker Vitals History BMI and BSA Data Body Mass Index Body Surface Area  
 38.6 kg/m 2 2.4 m 2 Preferred Pharmacy Pharmacy Name Phone [facility] [address] [phone] Your Updated Medication List  
  
   
This list is accurate as of 9/10/18 10:48 AM.  Always use your most recent med list.  
  
  
  
  
 ADVAIR DISKUS 500-50 mcg/dose diskus inhaler Generic drug:  fluticasone-salmeterol Take 1 Puff by inhalation every twelve (12) hours. albuterol 2.5 mg /3 mL (0.083 %) nebulizer solution Commonly known as:  PROVENTIL VENTOLIN  
3 mL by Nebulization route every four (4) hours as needed for Wheezing. aspirin delayed-release 81 mg tablet Take  by mouth daily. ipratropium 0.02 % Soln Commonly known as:  ATROVENT  
2.5 mL by Nebulization route four (4) times daily as needed. levothyroxine 88 mcg tablet Commonly known as:  SYNTHROID  
TAKE ONE TABLET BY MOUTH EVERY MORNING BEFORE BREAKFAST  
  
 losartan 100 mg tablet Commonly known as:  COZAAR  
TAKE ONE TABLET BY MOUTH DAILY lovastatin 20 mg tablet Commonly known as:  MEVACOR Take 1 Tab by mouth nightly. metFORMIN  mg tablet Commonly known as:  GLUCOPHAGE XR  
TAKE ONE TABLET BY MOUTH ONE TIME DAILY . metoprolol tartrate 50 mg tablet Commonly known as:  LOPRESSOR  
TAKE ONE TABLET BY MOUTH TWICE A DAY  
  
 montelukast 10 mg tablet Commonly known as:  SINGULAIR Take 1 Tab by mouth daily. spironolactone 25 mg tablet Commonly known as:  ALDACTONE Take 1 Tab by mouth daily as needed. terazosin 1 mg capsule Commonly known as:  HYTRIN  
TAKE ONE CAPSULE BY MOUTH ONCE NIGHTLY VIAGRA 100 mg tablet Generic drug:  sildenafil citrate Take 1 Tab by mouth as needed. Prescriptions Sent to Pharmacy Refills  
 metoprolol tartrate (LOPRESSOR) 50 mg tablet 1 Sig: TAKE ONE TABLET BY MOUTH TWICE A DAY Class: Normal  
 [facility]: [facility] [address] Ph #: [phone]  
 losartan (COZAAR) 100 mg tablet 1 Sig: TAKE ONE TABLET BY MOUTH DAILY Class: Normal  
 Pharmacy: [facility] [address] Ph #: [phone]  
 metFORMIN ER (GLUCOPHAGE XR) 500 mg tablet 3 Sig: TAKE ONE TABLET BY MOUTH ONE TIME DAILY . Class: Normal  
 Pharmacy: [facility] [address] Ph #: [phone]  
 levothyroxine (SYNTHROID) 88 mcg tablet 3 Sig: TAKE ONE TABLET BY MOUTH EVERY MORNING BEFORE BREAKFAST Class: Normal  
 Pharmacy: [facility] [address] Ph #: [phone]  
 lovastatin (MEVACOR) 20 mg tablet 1 Sig: Take 1 Tab by mouth nightly. Class: Normal  
 [facility] Ph #: [phone] Route: Oral  
 spironolactone (ALDACTONE) 25 mg tablet 1 Sig: Take 1 Tab by mouth daily as needed. Class: Normal  
 [facility] Ph #: [phone] Route: Oral  
  
We Performed the Following AMB POC GLUCOSE BLOOD, BY GLUCOSE MONITORING DEVICE [03534 CPT(R)] To-Do List   
 09/10/2018 Lab:  HEMOGLOBIN A1C WITH EAG   
  
 09/10/2018 Lab: METABOLIC PANEL, BASIC   
  
 09/10/2018 Lab:  TSH 3RD GENERATION Patient Instructions 20-30mm Hg knee high stockings Wear during the day, remove in the evening and elevated feet Take spironolactone as needed daily in AM for swelling. Learning About Using Compression Stockings What are compression stockings? Compression stockings may help ease symptoms and prevent problems caused by things like varicose veins, skin ulcers, and deep vein thrombosis. But there are different types of stockings, and they need to fit right. So your doctor will recommend what you need. These stockings are the most common treatment for varicose veins. They help the blood circulate in your legs. This can prevent skin ulcers and keep blood from building up in the legs. Prescription stockings are tightest at the foot. They get less and less tight farther up on your legs. The kind you buy without a prescription have lighter elastic. So the pressure is even all the way up the leg. These don't cost as much. But they don't provide the compression you need to treat serious symptoms or prevent skin ulcers. How do you use compression stockings? · If your stockings are new, you may want to wash them before you put them on. This can make them more flexible and easier to put on. It's a good idea to wash them by hand. · Most of the time it's best to put on your stockings early in the morning. This is when you have the least swelling in your legs. · Put silicone lotion (such as ALPS) or talcum powder on your legs to help the stockings slide on. If your stockings contain latex, or you aren't sure if they contain latex, do not use other types of lotions or creams on your legs when you wear the stockings. You may use other lotions or creams when you are not wearing the stockings. · Sit in a chair with a back while you put on the stockings. This gives you something to lean against as you pull them up. · How to put on stockings: ¨ Turn your stocking inside out. Then put your toe in as far as it will go. ¨ Readjust the stocking by folding it back onto itself at the ankle. Then hold both sides of the folded stocking. ¨ Pull toward your body as far as you can. ¨ Fold back the stocking again farther up on your leg. Then pull the stocking up to that point. ¨ Repeat folding back and pulling until the stocking is in the right place. · You may want to wear rubber gloves. They can help you hold the stockings better. · If your stockings don't have toes, use a silk \"slip sock. \" (You can get one from your medical supplier.) This will help the stocking slide over your foot better. When you're done, pull off the sock through the open toe. · If you still can't get your stockings on, you may want to use a \"stocking gao. \" This is a metal device that holds the stocking open while you step into it. It is often recommended for people who have problems grasping, leaning, or pulling. Before you buy one, try it first. Some people find them hard to use. What else should you know about compression stockings? · You will probably need to buy a new pair every 4 to 6 months. · They can be uncomfortable if you wear them all day. They are hot and may be hard to put on. · It is important to think about the pros and cons of stockings. You may not like them. But they may help relieve symptoms and prevent future problems. Where can you learn more? Go to http://buffy-christiano.info/. Enter J166 in the search box to learn more about \"Learning About Using Compression Stockings. \" Current as of: November 21, 2017 Content Version: 11.7 © 3285-3627 [employer]. Care instructions adapted under license by [employer] (which disclaims liability or warranty for this information).  If you have questions about a medical condition or this instruction, always ask your healthcare professional. [employer] disclaims any warranty or liability for your use of this information. Introducing [facility]! [employer] introduces [employer] patient portal. Now you can access parts of your medical record, email your doctor's office, and request medication refills online. 1. In your internet browser, go to [email or web address] 2. Click on the First Time User? Click Here link in the Sign In box. You will see the New Member Sign Up page. 3. Enter your [employer] Access Code exactly as it appears below. You will not need to use this code after youve completed the sign-up process. If you do not sign up before the expiration date, you must request a new code. · [employer] Access Code: [email or web address] Expires: 11/12/2018  9:35 AM 
 
4. Enter the last four digits of your Social Security Number (xxxx) and Date of Birth (mm/dd/yyyy) as indicated and click Submit. You will be taken to the next sign-up page. 5. Create a [employer] ID. This will be your [employer] login ID and cannot be changed, so think of one that is secure and easy to remember. 6. Create a [employer] password. You can change your password at any time. 7. Enter your Password Reset Question and Answer. This can be used at a later time if you forget your password. 8. Enter your e-mail address. You will receive e-mail notification when new information is available in [address] 9. Click Sign Up. You can now view and download portions of your medical record. 10. Click the Download Summary menu link to download a portable copy of your medical information. If you have questions, please visit the Frequently Asked Questions section of the [employer] website. Remember, [employer] is NOT to be used for urgent needs. For medical emergencies, dial 911. Now available from your iPhone and Android! Please provide this summary of care documentation to your next provider. Your primary care clinician is listed as Radha Solitario. Catalina Vences. If you have any questions after today's visit, please call [phone].

## 2018-09-10 NOTE — PROGRESS NOTES
Subjective:     Chief Complaint   Patient presents with    Diabetes    Foot Swelling        He  is a 80 y.o. male who presents for evaluation of HTN, DM 2 and low ext edema. Since LOV, Pt reports his lower edema has continued. Tends to be worse later in the day. No claudication like S&S nor ulceration/skin changes. Min relief w/ elevation. No other attempted remedies. No new Rx changes. Is also requesting refills. ROS  Gen - no fever/chills  Resp - no dyspnea or cough  CV - no chest pain or PERRY  Rest per HPI    Past Medical History:   Diagnosis Date    Blurred vision     Cirrhosis, alcoholic (HCC)     COPD bronchitis     Diabetes mellitus ([address]     Ear discomfort     History of ETOH abuse     HTN (hypertension)     SOB (shortness of breath)     Thrombocytopenia ([address]     Trouble in sleeping      Past Surgical History:   Procedure Laterality Date    HX CATARACT REMOVAL Right 2007    HX CATARACT REMOVAL Left 2012    HX CIRCUMCISION      at 24 y/o    HX HERNIA REPAIR  as a youth    right side    HX SHOULDER ARTHROSCOPY      left x 3 for torn rotator cuff muscle     Current Outpatient Prescriptions on File Prior to Visit   Medication Sig Dispense Refill    aspirin delayed-release 81 mg tablet Take  by mouth daily.  metFORMIN ER (GLUCOPHAGE XR) 500 mg tablet TAKE ONE TABLET BY MOUTH ONE TIME DAILY . 90 Tab 0    losartan (COZAAR) 100 mg tablet TAKE ONE TABLET BY MOUTH DAILY 30 Tab 0    terazosin (HYTRIN) 1 mg capsule TAKE ONE CAPSULE BY MOUTH ONCE NIGHTLY 30 Cap 5    metoprolol tartrate (LOPRESSOR) 50 mg tablet TAKE ONE TABLET BY MOUTH TWICE A  Tab 1    montelukast (SINGULAIR) 10 mg tablet Take 1 Tab by mouth daily. 90 Tab 3    albuterol (PROVENTIL VENTOLIN) 2.5 mg /3 mL (0.083 %) nebulizer solution 3 mL by Nebulization route every four (4) hours as needed for Wheezing.  180 Each 5    ipratropium (ATROVENT) 0.02 % soln 2.5 mL by Nebulization route four (4) times daily as needed. 180 mL 5    VIAGRA 100 mg tablet Take 1 Tab by mouth as needed. 30 Tab 3    lovastatin (MEVACOR) 20 mg tablet Take 1 Tab by mouth nightly. 90 Tab 1    levothyroxine (SYNTHROID) 88 mcg tablet TAKE ONE TABLET BY MOUTH EVERY MORNING BEFORE BREAKFAST 90 Tab 3    fluticasone-salmeterol (ADVAIR DISKUS) 500-50 mcg/dose diskus inhaler Take 1 Puff by inhalation every twelve (12) hours. No current facility-administered medications on file prior to visit. Objective:     Vitals:    09/10/18 1011   BP: 150/72   Pulse: 82   Temp: 97.8 °F (36.6 °C)   TempSrc: Oral   Weight: 261 lb 6.4 oz (118.6 kg)       Physical Examination:  General appearance - alert, well appearing, and in no distress  Eyes -sclera anicteric  Neck - supple, no significant adenopathy, no thyromegaly  Chest - clear to auscultation, no wheezes, rales or rhonchi, symmetric air entry  Heart - normal rate, regular rhythm, normal S1, S2, no murmurs, rubs, clicks or gallops  Neurological - alert, oriented, no focal findings or movement disorder noted    +2 pitting edema in bilat low ext      Assessment/ Plan:   Diagnoses and all orders for this visit:    1. Leg swelling  -     spironolactone (ALDACTONE) 25 mg tablet; Take 1 Tab by mouth daily as needed. -     METABOLIC PANEL, BASIC; Future    2. Encounter for screening  -     AMB POC GLUCOSE BLOOD, BY GLUCOSE MONITORING DEVICE    3. Essential hypertension  -     metoprolol tartrate (LOPRESSOR) 50 mg tablet; TAKE ONE TABLET BY MOUTH TWICE A DAY  -     METABOLIC PANEL, BASIC; Future    4. Hypokalemia  -     losartan (COZAAR) 100 mg tablet; TAKE ONE TABLET BY MOUTH DAILY  -     spironolactone (ALDACTONE) 25 mg tablet; Take 1 Tab by mouth daily as needed. 5. Type 2 diabetes mellitus with complication, without long-term current use of insulin (HCC)  -     metFORMIN ER (GLUCOPHAGE XR) 500 mg tablet; TAKE ONE TABLET BY MOUTH ONE TIME DAILY .     6. Hypothyroidism due to acquired atrophy of thyroid  -     levothyroxine (SYNTHROID) 88 mcg tablet; TAKE ONE TABLET BY MOUTH EVERY MORNING BEFORE BREAKFAST  -     TSH 3RD GENERATION; Future    7. Type 2 diabetes mellitus without complication, without long-term current use of insulin (HCC)  -     lovastatin (MEVACOR) 20 mg tablet; Take 1 Tab by mouth nightly.  -     HEMOGLOBIN A1C WITH EAG; Future         PRN aldactone for edema given hx of low K+. Repeat BMP today. Last K+ from July was borderline low. Refill Rx. Also recommend Pt attempt trial of compression hose, 20-30mm Hg, during the day and removed at night to help. Change POC based on K+/renal function. Re-assess in 6-8 weeks. I have discussed the diagnosis with the patient and the intended plan as seen in the above orders. The patient has received an after-visit summary and questions were answered concerning future plans. I have discussed medication side effects and warnings with the patient as well. The patient verbalizes understanding and agreement with the plan.     Follow-up Disposition: Not on File

## 2018-09-10 NOTE — PROGRESS NOTES
Coordination of Care  1. Have you been to the ER, urgent care clinic since your last visit? Hospitalized since your last visit? No    2. Have you seen or consulted any other health care providers outside of the [address] [place] [address] since your last visit? Include any pap smears or colon screening. No    Does the patient need refills? YES    Learning Assessment Complete?  yes  Depression Screening complete in the past 12 months? yes     Results for orders placed or performed in visit on 09/10/18   AMB POC GLUCOSE BLOOD, BY GLUCOSE MONITORING DEVICE   Result Value Ref Range    Glucose POC nf 117 mg/dL

## 2018-09-10 NOTE — PATIENT INSTRUCTIONS
20-30mm Hg knee high stockings    Wear during the day, remove in the evening and elevated feet     Take spironolactone as needed daily in AM for swelling. Learning About Using Compression Stockings  What are compression stockings? Compression stockings may help ease symptoms and prevent problems caused by things like varicose veins, skin ulcers, and deep vein thrombosis. But there are different types of stockings, and they need to fit right. So your doctor will recommend what you need. These stockings are the most common treatment for varicose veins. They help the blood circulate in your legs. This can prevent skin ulcers and keep blood from building up in the legs. Prescription stockings are tightest at the foot. They get less and less tight farther up on your legs. The kind you buy without a prescription have lighter elastic. So the pressure is even all the way up the leg. These don't cost as much. But they don't provide the compression you need to treat serious symptoms or prevent skin ulcers. How do you use compression stockings? · If your stockings are new, you may want to wash them before you put them on. This can make them more flexible and easier to put on. It's a good idea to wash them by hand. · Most of the time it's best to put on your stockings early in the morning. This is when you have the least swelling in your legs. · Put silicone lotion (such as ALPS) or talcum powder on your legs to help the stockings slide on. If your stockings contain latex, or you aren't sure if they contain latex, do not use other types of lotions or creams on your legs when you wear the stockings. You may use other lotions or creams when you are not wearing the stockings. · Sit in a chair with a back while you put on the stockings. This gives you something to lean against as you pull them up. · How to put on stockings:  ¨ Turn your stocking inside out. Then put your toe in as far as it will go.   ¨ Readjust the stocking by folding it back onto itself at the ankle. Then hold both sides of the folded stocking. ¨ Pull toward your body as far as you can. ¨ Fold back the stocking again farther up on your leg. Then pull the stocking up to that point. ¨ Repeat folding back and pulling until the stocking is in the right place. · You may want to wear rubber gloves. They can help you hold the stockings better. · If your stockings don't have toes, use a silk \"slip sock. \" (You can get one from your medical supplier.) This will help the stocking slide over your foot better. When you're done, pull off the sock through the open toe. · If you still can't get your stockings on, you may want to use a \"stocking gao. \" This is a metal device that holds the stocking open while you step into it. It is often recommended for people who have problems grasping, leaning, or pulling. Before you buy one, try it first. Some people find them hard to use. What else should you know about compression stockings? · You will probably need to buy a new pair every 4 to 6 months. · They can be uncomfortable if you wear them all day. They are hot and may be hard to put on. · It is important to think about the pros and cons of stockings. You may not like them. But they may help relieve symptoms and prevent future problems. Where can you learn more? Go to http://buffy-christiano.info/. Enter J166 in the search box to learn more about \"Learning About Using Compression Stockings. \"  Current as of: November 21, 2017  Content Version: 11.7  © 7516-1599 [employer]. Care instructions adapted under license by [employer] (which disclaims liability or warranty for this information). If you have questions about a medical condition or this instruction, always ask your healthcare professional. [place] [address] any warranty or liability for your use of this information.

## 2018-09-11 LAB — TSH SERPL DL<=0.05 MIU/L-ACNC: 3.41 UIU/ML (ref 0.36–3.74)

## 2018-09-13 ENCOUNTER — DOCUMENTATION ONLY (OUTPATIENT)
Dept: FAMILY MEDICINE CLINIC | Age: 68
End: 2018-09-13

## 2018-09-13 NOTE — PROGRESS NOTES
On 08-27-18 Dr. Jesus Alberto Randle signed and faxed to [facility], [facility], the patient's PAP prescription for Spiriva 18mcg. Fax confirmation received.  Miranda Adam RN

## 2018-09-13 NOTE — PROGRESS NOTES
Labs WNL/cont POC. K+ levels are ok to start PRN aldactone for low ext edema, has f/u appt to discuss.

## 2018-09-26 ENCOUNTER — TELEPHONE (OUTPATIENT)
Dept: FAMILY MEDICINE CLINIC | Age: 68
End: 2018-09-26

## 2018-09-26 NOTE — TELEPHONE ENCOUNTER
Telephone call made to the patient to let him know that there are two PAP med orders at [facility] that are ready to be picked-up: Advair Diskus, 3 boxes, and Viagra, 1 bottle. The patient expressed understanding and stated he wasn't sure when he would come to pick them up.  Linda Bush RN

## 2018-10-04 ENCOUNTER — CLINICAL SUPPORT (OUTPATIENT)
Dept: FAMILY MEDICINE CLINIC | Age: 68
End: 2018-10-04

## 2018-10-04 DIAGNOSIS — Z79.899 MEDICATION MANAGEMENT: Primary | ICD-10-CM

## 2018-10-04 NOTE — PROGRESS NOTES
Patient came in today to  Pharmacy assistance program medications ( Advair Diskus 500/50 and Viagra 100mg), dose was verified with the patient and patient signed the Pharmacy Assistance Meds log after receiving the medications. No other questions from patient at this time.  Lucille Gaytan RN

## 2018-10-24 ENCOUNTER — OFFICE VISIT (OUTPATIENT)
Dept: FAMILY MEDICINE CLINIC | Age: 68
End: 2018-10-24

## 2018-10-24 VITALS
SYSTOLIC BLOOD PRESSURE: 138 MMHG | DIASTOLIC BLOOD PRESSURE: 70 MMHG | WEIGHT: 259 LBS | TEMPERATURE: 97.6 F | BODY MASS INDEX: 38.25 KG/M2 | HEART RATE: 78 BPM

## 2018-10-24 DIAGNOSIS — Z13.9 ENCOUNTER FOR SCREENING: Primary | ICD-10-CM

## 2018-10-24 DIAGNOSIS — R25.2 FOOT CRAMPS: ICD-10-CM

## 2018-10-24 DIAGNOSIS — Z23 ENCOUNTER FOR IMMUNIZATION: ICD-10-CM

## 2018-10-24 LAB — GLUCOSE POC: NORMAL MG/DL

## 2018-10-24 NOTE — PROGRESS NOTES
Subjective:     Chief Complaint   Patient presents with    Follow-up     Follow up visit    Immunization/Injection     he is a 80y.o. year old male who presents for evalution. F/u COPD, htn, thrombocytopenia secondary to cirrhosis. No etoh since 1985. Would like to stop metformin since he doesn't have diabetes, and doesn't know why he is taking the spiirono, which was started last month for prn edema. Says that compression hose work great for edema and doesn't usually need the spirono although has been taking it daily since he didn't realize it was prn. States his breathing is pretty good, walks on a daily basis. Hasn't smoked for a long time. Only c/o today is cramping of his toes at night for the last few months, no exertional sx.  hypothy-- stable on current synthroid dose. Past Medical History:   Diagnosis Date    Blurred vision     Cirrhosis, alcoholic ([address]     COPD bronchitis     Diabetes mellitus ([address]     Ear discomfort     History of ETOH abuse     HTN (hypertension)     SOB (shortness of breath)     Thrombocytopenia ([address]     Trouble in sleeping              Objective:     Vitals:    10/24/18 1110 10/24/18 1119   BP: 145/68 138/70   Pulse: 78 78   Temp: 97.6 °F (36.4 °C)    TempSrc: Oral    Weight: 259 lb (117.5 kg)        Physical Examination: General appearance - alert, well appearing, and in no distress, high BMI. Neck without masses or thyromegaly, no carotid bruits. Heart rrr no mrg. Lungs clear. abd soft nt.  extrem-- brawny edema, good DP and PT pulses, dry skin. Assessment/ Plan:     1. Toe cramps-- recheck K+ and Mg++;  Ca was borderline, will check vit D level. 2.  COPD stable. Continue current meds. 3.  Edema-- probably multifactorial, discussed using compr. Hose daily and spirono only if edema isn't controlled by that. I didn't refill the spirono as he has 8-10 tabs left. 4.  He doesn't have hyperlipidemia or DM-- ok to stop metformin.     Results for orders placed or performed in visit on 10/24/18   AMB POC GLUCOSE BLOOD, BY GLUCOSE MONITORING DEVICE   Result Value Ref Range    Glucose  NF mg/dL       Orders Placed This Encounter    Influenza virus vaccine (QUADRIVALENT PRES FREE SYRINGE) IM (54900)    METABOLIC PANEL, BASIC     Standing Status:   Future     Standing Expiration Date:   4/24/2019    MAGNESIUM     Standing Status:   Future     Standing Expiration Date:   4/24/2019    VITAMIN D, 25 HYDROXY     Standing Status:   Future     Standing Expiration Date:   4/24/2019    AMB POC GLUCOSE BLOOD, BY GLUCOSE MONITORING DEVICE    tiotropium (SPIRIVA) 18 mcg inhalation capsule     Sig: Take 1 Cap by inhalation daily. Dispense:  30 Cap     Refill:  11     Add to refills.            Follow-up Disposition: Not on File

## 2018-10-24 NOTE — PROGRESS NOTES
Coordination of Care  1. Have you been to the ER, urgent care clinic since your last visit? Hospitalized since your last visit? No    2. Have you seen or consulted any other health care providers outside of the [address] [place] [address] since your last visit? Include any pap smears or colon screening. No    Does the patient need refills? YES    Learning Assessment Complete?  yes

## 2018-10-24 NOTE — PROGRESS NOTES
Gave vaccine per protocol. See scanned vaccination consent form. Documented in [address] [place]. Gave patient VIS information sheet and reviewed instructions regarding possible adverse side effects and allergic reactions. No adverse reaction noted at time of discharge.  Christina Jamison RN

## 2018-10-25 LAB
25(OH)D3+25(OH)D2 SERPL-MCNC: 16.3 NG/ML (ref 30–100)
BUN SERPL-MCNC: 19 MG/DL (ref 8–27)
BUN/CREAT SERPL: 17 (ref 10–24)
CALCIUM SERPL-MCNC: 9.4 MG/DL (ref 8.6–10.2)
CHLORIDE SERPL-SCNC: 107 MMOL/L (ref 96–106)
CO2 SERPL-SCNC: 20 MMOL/L (ref 20–29)
CREAT SERPL-MCNC: 1.12 MG/DL (ref 0.76–1.27)
GLUCOSE SERPL-MCNC: 125 MG/DL (ref 65–99)
MAGNESIUM SERPL-MCNC: 1.5 MG/DL (ref 1.6–2.3)
POTASSIUM SERPL-SCNC: 3.8 MMOL/L (ref 3.5–5.2)
SODIUM SERPL-SCNC: 139 MMOL/L (ref 134–144)

## 2018-10-27 RX ORDER — ERGOCALCIFEROL 1.25 MG/1
50000 CAPSULE ORAL
Qty: 12 CAP | Refills: 3 | Status: SHIPPED | OUTPATIENT
Start: 2018-10-27 | End: 2019-05-28 | Stop reason: SDDI

## 2018-10-27 RX ORDER — CALCIUM CARBONATE/VITAMIN D3 500-10/5ML
LIQUID (ML) ORAL
Qty: 90 CAP | Refills: 2 | Status: SHIPPED | OUTPATIENT
Start: 2018-10-27 | End: 2019-08-29 | Stop reason: SDUPTHER

## 2018-11-06 ENCOUNTER — TELEPHONE (OUTPATIENT)
Dept: FAMILY MEDICINE CLINIC | Age: 68
End: 2018-11-06

## 2018-11-06 NOTE — TELEPHONE ENCOUNTER
Patient called and left vm that he would like to let Dr. Mary Cuevas know that he has make an appt. At [address] on 12/18/18.

## 2018-12-17 ENCOUNTER — TELEPHONE (OUTPATIENT)
Dept: FAMILY MEDICINE CLINIC | Age: 68
End: 2018-12-17

## 2018-12-18 ENCOUNTER — HOSPITAL ENCOUNTER (OUTPATIENT)
Dept: LAB | Age: 68
Discharge: HOME | End: 2018-12-18
Payer: MEDICARE

## 2018-12-18 ENCOUNTER — OFFICE VISIT (OUTPATIENT)
Dept: FAMILY MEDICINE CLINIC | Age: 68
End: 2018-12-18

## 2018-12-18 VITALS
RESPIRATION RATE: 16 BRPM | TEMPERATURE: 97.8 F | WEIGHT: 251.6 LBS | DIASTOLIC BLOOD PRESSURE: 64 MMHG | BODY MASS INDEX: 38.13 KG/M2 | OXYGEN SATURATION: 97 % | HEART RATE: 73 BPM | HEIGHT: 68 IN | SYSTOLIC BLOOD PRESSURE: 125 MMHG

## 2018-12-18 DIAGNOSIS — J43.9 PULMONARY EMPHYSEMA, UNSPECIFIED EMPHYSEMA TYPE (HCC): ICD-10-CM

## 2018-12-18 DIAGNOSIS — D48.9 NEOPLASM OF UNCERTAIN BEHAVIOR: ICD-10-CM

## 2018-12-18 DIAGNOSIS — K74.60 CIRRHOSIS OF LIVER WITHOUT ASCITES, UNSPECIFIED HEPATIC CIRRHOSIS TYPE (HCC): ICD-10-CM

## 2018-12-18 DIAGNOSIS — Z23 ENCOUNTER FOR IMMUNIZATION: ICD-10-CM

## 2018-12-18 DIAGNOSIS — M54.6 CHRONIC LEFT-SIDED THORACIC BACK PAIN: ICD-10-CM

## 2018-12-18 DIAGNOSIS — E66.01 SEVERE OBESITY (BMI 35.0-39.9) WITH COMORBIDITY (HCC): ICD-10-CM

## 2018-12-18 DIAGNOSIS — E11.21 TYPE 2 DIABETES MELLITUS WITH NEPHROPATHY (HCC): ICD-10-CM

## 2018-12-18 DIAGNOSIS — Z12.11 SCREEN FOR COLON CANCER: ICD-10-CM

## 2018-12-18 DIAGNOSIS — Z12.5 SCREENING FOR PROSTATE CANCER: ICD-10-CM

## 2018-12-18 DIAGNOSIS — I10 ESSENTIAL HYPERTENSION: ICD-10-CM

## 2018-12-18 DIAGNOSIS — G89.29 CHRONIC LEFT-SIDED THORACIC BACK PAIN: ICD-10-CM

## 2018-12-18 DIAGNOSIS — D69.6 THROMBOCYTOPENIA (HCC): Primary | ICD-10-CM

## 2018-12-18 DIAGNOSIS — E03.4 HYPOTHYROIDISM DUE TO ACQUIRED ATROPHY OF THYROID: ICD-10-CM

## 2018-12-18 LAB — HBA1C MFR BLD HPLC: 5.3 %

## 2018-12-18 PROCEDURE — 81003 URINALYSIS AUTO W/O SCOPE: CPT

## 2018-12-18 PROCEDURE — 80061 LIPID PANEL: CPT

## 2018-12-18 PROCEDURE — 85027 COMPLETE CBC AUTOMATED: CPT

## 2018-12-18 PROCEDURE — 80053 COMPREHEN METABOLIC PANEL: CPT

## 2018-12-18 PROCEDURE — 36415 COLL VENOUS BLD VENIPUNCTURE: CPT

## 2018-12-18 PROCEDURE — 84443 ASSAY THYROID STIM HORMONE: CPT

## 2018-12-18 PROCEDURE — 84153 ASSAY OF PSA TOTAL: CPT

## 2018-12-18 RX ORDER — DICLOFENAC SODIUM 50 MG/1
50 TABLET, DELAYED RELEASE ORAL
Qty: 60 TAB | Refills: 1 | Status: SHIPPED | OUTPATIENT
Start: 2018-12-18 | End: 2019-03-21 | Stop reason: SDUPTHER

## 2018-12-18 NOTE — PROGRESS NOTES
HISTORY OF PRESENT ILLNESS  Vinod Do is a 80 y.o. male. HPI    The patient presents today to establish care. Previous PCP was Ely Matthews NP. Last office visit October 2018. 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 PSHx is significant for rotator cuff surgeries, cataract surgery, hernia repair. FHx is significant for heart disease. COPD with emphysema  Takes spiriva 18mcg once daily, advair 500-50mcg twice daily, and uses albuterol and atrovent twice daily for COPD. He also uses the albuterol PRN, but he has not needed it. Takes singulair 10mg daily as well. He is a former smoker. He quit in approximately 2014. Was previously smoking 1/2 - 1ppd. Does not see pulmonology. Denies history of intubation. HTN  Patient takes losartan 100mg daily and metoprolol 50mg BID regularly. Patient does not check blood pressure at home routinely. Denies chest pain, shortness of breath, headache, lightheadedness, peripheral edema. Blood pressure in office today is 125/64, which is at goal.   Takes spironolactone 25mg PRN edema. Saw Dr. Fer Hooper in May 2018 and had a normal cardiac cath after having an abnormal stress echo. Hyperlipidemia  Takes lovastatin 20mg once daily. Denies RUQ pain or myalgias. Not exercising. Working on diet. Thrombocytopenia 2/2 alcoholic cirrhosis  Most recent platelet count was 87 in July 2018. Denies any abnormal bleeding. Alcoholic cirrhosis  He has a history of alcoholic cirrhosis. He quit drinking in [address] [place] [address] after a history of heavy drinking prior to that. He was hospitalized in 2010 and was told \"it's not that bad. \" He does not see a liver/GI specialist.   Last abd ultrasound was in August 2013, which showed hepatic cirrhosis with no focal abnormality to suggest neoplasm, per chart review. Hypothyroidism  Takes levothyroxine 88 mcg once daily.    His hypothyroidism was diagnosed in 2010.   Most recent TSH was 3.41 in Sept 2018. BPH  Has a history of BPH, but patient states \"they haven't told me I have it. \" He takes terazosin 1mg nightly. Denies family history of prostate CA. Gets up 0 - 1 times nightly to urinate. Current Specialists:  1. Dr. Renetta Howard, cardiology - last visit Aug 2018, no f/u recommendations    Acute complaints today:  1. Has acute on chronic thoracic back pain. Reports \"I know I have a pinched disc. \" Wondering if there is anything he can take for it. No falls or injury. No numbness/tingling or change in bowel/bladder habits. 2. Has a growth on his right hand, which has been present for about 2 years. It doesn't hurt, but if he pulls part of it off, it just grows right back. Wondering what it is. Has never been evaluated before. Preventative Care  TDaP: unsure; will update today  Colonoscopy: 1969, per patient report; \"I ain't never had no problems\" but agrees to referral today    Healthy Habits  Patient is exercising 0x per week, \"I don't have the breath to do that. \"   Patient endorses mostly healthy diet; avoids fatty/greasy foods, sugar-sweetened beverages. Denies tobacco use. He quit in 2014. Denies alcohol use; former alcoholic and quit in 6365. Denies illicit drug use. Sexually active with 1 female partner in last 12 months. Uses viagra PRN for ED, about 4x weekly.      Past Medical History:   Diagnosis Date    Blurred vision     Cirrhosis, alcoholic (HCC)     COPD bronchitis     Ear discomfort     History of ETOH abuse     HTN (hypertension)     SOB (shortness of breath)     Thrombocytopenia (HCC)     Thyroid disease     Trouble in sleeping      Past Surgical History:   Procedure Laterality Date    HX CATARACT REMOVAL Right 2007    HX CATARACT REMOVAL Left 2012    HX CIRCUMCISION      at 26 y/o    HX HERNIA REPAIR  as a youth    right side    HX SHOULDER ARTHROSCOPY      left x 3 for torn rotator cuff muscle     Family History   Problem Relation Age of Onset    Hypertension Father     Heart Disease Father         pacemaker,  of MI @ 80    Hypertension Mother     Cancer Mother         breast with spread to bones    Diabetes Neg Hx      Social History     Socioeconomic History    Marital status:      Spouse name: Not on file    Number of children: Not on file    Years of education: Not on file    Highest education level: Not on file   Tobacco Use    Smoking status: Former Smoker     Types: Cigarettes     Last attempt to quit: 2014     Years since quittin.1    Smokeless tobacco: Former User    Tobacco comment: uses vap   Substance and Sexual Activity    Alcohol use: No     Alcohol/week: 0.0 oz     Comment: Quit Oct 21, 1985    Drug use: No    Sexual activity: Yes     Partners: Female     Birth control/protection: None     Patient Active Problem List   Diagnosis Code    Obstructive sleep apnea (adult) (pediatric) G47.33    COPD (chronic obstructive pulmonary disease) ([facility] J44.9    Cirrhosis ([facility] K74.60    History of ETOH abuse Z87.898    Thrombocytopenia ([facility] D69.6    Lower urinary tract symptoms (LUTS) R39.9    Hypothyroidism due to acquired atrophy of thyroid E03.4    Essential hypertension I10    Chronic left-sided thoracic back pain M54.6, G89.29    Severe obesity (BMI 35.0-39. 9) with comorbidity ([facility] E66.01    Angina, class III (HCC) I20.9    Abnormal stress test R94.39    Leg swelling M79.89    Mixed hyperlipidemia E78.2     Outpatient Encounter Medications as of 2018   Medication Sig Dispense Refill    diclofenac EC (VOLTAREN) 50 mg EC tablet Take 1 Tab by mouth two (2) times daily as needed. Take with food or milk. 60 Tab 1    magnesium oxide 400 mg cap 1qd for toe cramps 90 Cap 2    ergocalciferol (ERGOCALCIFEROL) 50,000 unit capsule Take 1 Cap by mouth every seven (7) days. For 8 weeks and then once monthly.  12 Cap 3    tiotropium (SPIRIVA) 18 mcg inhalation capsule Take 1 Cap by inhalation daily. 30 Cap 11    metoprolol tartrate (LOPRESSOR) 50 mg tablet TAKE ONE TABLET BY MOUTH TWICE A  Tab 1    losartan (COZAAR) 100 mg tablet TAKE ONE TABLET BY MOUTH DAILY 90 Tab 1    levothyroxine (SYNTHROID) 88 mcg tablet TAKE ONE TABLET BY MOUTH EVERY MORNING BEFORE BREAKFAST 90 Tab 3    lovastatin (MEVACOR) 20 mg tablet Take 1 Tab by mouth nightly. 90 Tab 1    aspirin delayed-release 81 mg tablet Take  by mouth daily.  terazosin (HYTRIN) 1 mg capsule TAKE ONE CAPSULE BY MOUTH ONCE NIGHTLY 30 Cap 5    montelukast (SINGULAIR) 10 mg tablet Take 1 Tab by mouth daily. 90 Tab 3    albuterol (PROVENTIL VENTOLIN) 2.5 mg /3 mL (0.083 %) nebulizer solution 3 mL by Nebulization route every four (4) hours as needed for Wheezing. 180 Each 5    ipratropium (ATROVENT) 0.02 % soln 2.5 mL by Nebulization route four (4) times daily as needed. 180 mL 5    fluticasone-salmeterol (ADVAIR DISKUS) 500-50 mcg/dose diskus inhaler Take 1 Puff by inhalation every twelve (12) hours.  spironolactone (ALDACTONE) 25 mg tablet Take 1 Tab by mouth daily as needed. 30 Tab 1    VIAGRA 100 mg tablet Take 1 Tab by mouth as needed. 30 Tab 3     No facility-administered encounter medications on file as of 12/18/2018. Visit Vitals  /64 (BP 1 Location: Right arm, BP Patient Position: Sitting)   Pulse 73   Temp 97.8 °F (36.6 °C) (Oral)   Resp 16   Ht 5' 8\" (1.727 m)   Wt 251 lb 9.6 oz (114.1 kg)   SpO2 97%   BMI 38.26 kg/m²         Review of Systems   Constitutional: Negative for chills, fever and malaise/fatigue. HENT: Negative for congestion. Eyes: Negative for blurred vision and double vision. Respiratory: Negative for cough, sputum production, shortness of breath and wheezing. Cardiovascular: Negative for chest pain, palpitations, orthopnea and leg swelling. Gastrointestinal: Negative for blood in stool. Genitourinary: Negative for dysuria, frequency, hematuria and urgency. Musculoskeletal: Positive for back pain. Negative for falls. Neurological: Negative for dizziness, seizures and headaches. Endo/Heme/Allergies: Negative for polydipsia. Psychiatric/Behavioral: Negative for depression. Physical Exam   Constitutional: He is oriented to person, place, and time. He appears well-developed and well-nourished. No distress. HENT:   Head: Normocephalic and atraumatic. Mouth/Throat: Oropharynx is clear and moist. No oropharyngeal exudate. Neck: No thyromegaly present. Cardiovascular: Normal rate, regular rhythm and normal heart sounds. Pulmonary/Chest: Effort normal and breath sounds normal. No respiratory distress. He has no wheezes. Abdominal: Soft. Bowel sounds are normal. He exhibits distension. He exhibits no mass. There is no tenderness. There is no rebound and no guarding. Mildly distended abdomen; no organomegaly   Neurological: He is alert and oriented to person, place, and time. Skin: Skin is warm and dry. He is not diaphoretic.   1cm x 1cm white hardened growth on a flat erythematous base on posterior aspect of right hand; non-tender (see photo)   Psychiatric: He has a normal mood and affect. His behavior is normal. Judgment normal.   Nursing note and vitals reviewed. ASSESSMENT and PLAN    ICD-10-CM ICD-9-CM    1. Thrombocytopenia (HCC) D69.6 287.5 CBC W/O DIFF   2. Severe obesity (BMI 35.0-39. 9) with comorbidity ([facility] E66.01 278.01    3. Pulmonary emphysema, unspecified emphysema type ([facility] J43.9 492.8    4. Essential hypertension I10 401.9 URINALYSIS W/ RFLX MICROSCOPIC      METABOLIC PANEL, COMPREHENSIVE   5. Hypothyroidism due to acquired atrophy of thyroid E03.4 244.8 TSH RFX ON ABNORMAL TO FREE T4     246.8    6. Encounter for immunization Z23 V03.89 MO IMMUNIZ ADMIN,1 SINGLE/COMB VAC/TOXOID      TETANUS, DIPHTHERIA TOXOIDS AND ACELLULAR PERTUSSIS VACCINE (TDAP), IN INDIVIDS. >=7, IM   7.  Type 2 diabetes mellitus with nephropathy ([facility] E11.21 250.40 AMB POC HEMOGLOBIN A1C     322.05 METABOLIC PANEL, COMPREHENSIVE      LIPID PANEL AND CHOL/HDL RATIO   8. Neoplasm of uncertain behavior O63.8 238.9 SURGICAL PATHOLOGY   9. Screen for colon cancer Z12.11 V76.51 REFERRAL TO GASTROENTEROLOGY   10. Cirrhosis of liver without ascites, unspecified hepatic cirrhosis type (HCC) K74.60 571.5 REFERRAL TO GASTROENTEROLOGY   11. Screening for prostate cancer Z12.5 V76.44 PSA W/ REFLX FREE PSA   12. Chronic left-sided thoracic back pain M54.6 724.1 diclofenac EC (VOLTAREN) 50 mg EC tablet    G89.29 338.29      1. Establish care - The patient's medications, allergies, and history were all updated today. The patient is not a very good historian. 2. COPD - Breathing appears stable on current regimen; continue. 3. HTN - Blood pressure well-controlled; continue current medications. 4. T2DM - In history, but not an active diagnosis according to last PCP; will d/c from problem list pending normal A1C today. 5. Hypothyroidism - Recheck TSH today. 6. Cirrhosis of the liver - Check LFTs today. Referred to GI for further evaluation/mgt. 7. LUTS/BPH/ - Check PSA today. Consider referral to urology. 8. Neoplasm of uncertain behavior - After informed consent was obtained, a shave biopsy was sent today. 9. Chronic thoracic back pain - Trial diclofenac PRN. Consider referral to PT at next office visit. 10. Preventative Care - TDaP today. Referred for colonoscopy. Will chip away at his preventative care needs over the next few months. Check labs today and have patient return in 6 weeks for AWV, sooner PRN or TBD by lab results. Follow-up Disposition:  Return in about 6 weeks (around 1/29/2019) for AWV, sooner PRN.

## 2018-12-18 NOTE — PROGRESS NOTES
Chief Complaint   Patient presents with   [address] [place] [address]     Patient here to establish care. Previous PCP: Margaret Dickerson   Patient sees the following specialist none  Release of Medical Information signed by patient today yes   Patient Concerns back pain    Verbal Order received from Vinicio Sterling NP for Tdap given IM in left arm. DEPARTMENT OF [facility]  OFFICE PROCEDURE PROGRESS NOTE        Chart reviewed for the following:   Max Barrientos LPN, have reviewed the History, Physical and updated the Allergic reactions for [place] performed immediately prior to start of procedure:   Max Barrientos LPN, have performed the following reviews on [facility] prior to the start of the procedure:            * Patient was identified by name and date of birth   * Agreement on procedure being performed was verified  * Risks and Benefits explained to the patient  * Procedure site verified and marked as necessary  * Patient was positioned for comfort  * Consent was signed and verified     Time: 2:35 p.m. Date of procedure: 12/18/2018    Procedure performed by:  Aramis Chaudhary NP    Provider assisted by: Irene Fisher LPN    Patient assisted by: self    How tolerated by patient: tolerated the procedure well with no complications    Post Procedural Pain Scale: 0 - No Hurt    Comment:  Consent signed for excision and biopsy of skin tag on posterior right hand.

## 2018-12-18 NOTE — ASSESSMENT & PLAN NOTE
Key Antihyperglycemic Medications     Patient is on no antihyperglycemic meds. Other Key Diabetic Medications             losartan (COZAAR) 100 mg tablet  (Taking) TAKE ONE TABLET BY MOUTH DAILY    lovastatin (MEVACOR) 20 mg tablet  (Taking) Take 1 Tab by mouth nightly.         Lab Results   Component Value Date/Time    Hemoglobin A1c 5.5 09/10/2018 10:58 AM    Glucose 125 10/24/2018 03:42 PM    Creatinine 1.12 10/24/2018 03:42 PM    Cholesterol, total 111 10/25/2017 10:28 AM    HDL Cholesterol 60 10/25/2017 10:28 AM    LDL, calculated 35.4 10/25/2017 10:28 AM    Triglyceride 78 10/25/2017 10:28 AM     Diabetic Foot and Eye Exam HM Status   Topic Date Due    Eye Exam  07/01/2015    Diabetic Foot Care  10/13/2016

## 2018-12-19 ENCOUNTER — HOSPITAL ENCOUNTER (OUTPATIENT)
Dept: LAB | Age: 68
Discharge: HOME | End: 2018-12-19

## 2018-12-20 DIAGNOSIS — D48.9 NEOPLASM OF UNCERTAIN BEHAVIOR: Primary | ICD-10-CM

## 2018-12-20 LAB
ALBUMIN SERPL-MCNC: 3.5 G/DL (ref 3.6–4.8)
ALBUMIN/GLOB SERPL: 1.2 {RATIO} (ref 1.2–2.2)
ALP SERPL-CCNC: 100 IU/L (ref 39–117)
ALT SERPL-CCNC: 25 IU/L (ref 0–44)
APPEARANCE UR: CLEAR
AST SERPL-CCNC: 39 IU/L (ref 0–40)
BILIRUB SERPL-MCNC: 1.8 MG/DL (ref 0–1.2)
BILIRUB UR QL STRIP: NEGATIVE
BUN SERPL-MCNC: 14 MG/DL (ref 8–27)
BUN/CREAT SERPL: 12 (ref 10–24)
CALCIUM SERPL-MCNC: 9.2 MG/DL (ref 8.6–10.2)
CHLORIDE SERPL-SCNC: 106 MMOL/L (ref 96–106)
CHOLEST SERPL-MCNC: 96 MG/DL (ref 100–199)
CHOLEST/HDLC SERPL: 2.1 RATIO (ref 0–5)
CO2 SERPL-SCNC: 26 MMOL/L (ref 20–29)
COLOR UR: YELLOW
CREAT SERPL-MCNC: 1.2 MG/DL (ref 0.76–1.27)
ERYTHROCYTE [DISTWIDTH] IN BLOOD BY AUTOMATED COUNT: 14.9 % (ref 12.3–15.4)
GLOBULIN SER CALC-MCNC: 2.9 G/DL (ref 1.5–4.5)
GLUCOSE SERPL-MCNC: 97 MG/DL (ref 65–99)
GLUCOSE UR QL: NEGATIVE
HCT VFR BLD AUTO: 42.2 % (ref 37.5–51)
HDLC SERPL-MCNC: 46 MG/DL
HGB BLD-MCNC: 14.3 G/DL (ref 13–17.7)
HGB UR QL STRIP: NEGATIVE
INTERPRETATION, 910389: NORMAL
KETONES UR QL STRIP: NEGATIVE
LDLC SERPL CALC-MCNC: 32 MG/DL (ref 0–99)
LEUKOCYTE ESTERASE UR QL STRIP: NEGATIVE
Lab: NORMAL
MCH RBC QN AUTO: 34.3 PG (ref 26.6–33)
MCHC RBC AUTO-ENTMCNC: 33.9 G/DL (ref 31.5–35.7)
MCV RBC AUTO: 101 FL (ref 79–97)
MICRO URNS: NORMAL
MORPHOLOGY BLD-IMP: ABNORMAL
NITRITE UR QL STRIP: NEGATIVE
PH UR STRIP: 7 [PH] (ref 5–7.5)
PLATELET # BLD AUTO: 99 X10E3/UL (ref 150–379)
POTASSIUM SERPL-SCNC: 3.9 MMOL/L (ref 3.5–5.2)
PROT SERPL-MCNC: 6.4 G/DL (ref 6–8.5)
PROT UR QL STRIP: NEGATIVE
PSA SERPL-MCNC: 1.7 NG/ML (ref 0–4)
RBC # BLD AUTO: 4.17 X10E6/UL (ref 4.14–5.8)
REFLEX CRITERIA: NORMAL
SODIUM SERPL-SCNC: 142 MMOL/L (ref 134–144)
SP GR UR: 1.02 (ref 1–1.03)
TRIGL SERPL-MCNC: 91 MG/DL (ref 0–149)
TSH SERPL DL<=0.005 MIU/L-ACNC: 1.97 UIU/ML (ref 0.45–4.5)
UROBILINOGEN UR STRIP-MCNC: 1 MG/DL (ref 0.2–1)
VLDLC SERPL CALC-MCNC: 18 MG/DL (ref 5–40)
WBC # BLD AUTO: 5.7 X10E3/UL (ref 3.4–10.8)

## 2018-12-20 NOTE — PROGRESS NOTES
Please let patient know that we will discuss the majority of his labs at his appt in January, but overall they look good. His biopsy from the other day showed over-production of certain types of cells, but is inconclusive for the cause. I would like him to see a dermatologist for further evaluation; I have placed this referral and he should be hearing about this appt soon. Thanks!   CAB

## 2018-12-21 ENCOUNTER — TELEPHONE (OUTPATIENT)
Dept: FAMILY MEDICINE CLINIC | Age: 68
End: 2018-12-21

## 2018-12-21 NOTE — TELEPHONE ENCOUNTER
Patient notified of no remarkable lab results but will review with patient at office visit in January. Patient notified of biopsy results and referral to Dr. Rashida Collado - dermatologist.  Patient notified that he has to make appointment per Dr. Marquis Swift office. Patient will call back for Dr. Marquis Swift name and number. Patient unable to write information down as he was driving.

## 2018-12-21 NOTE — TELEPHONE ENCOUNTER
----- Message from Jv Lujan NP sent at 12/20/2018  3:28 PM EST -----  Please let patient know that we will discuss the majority of his labs at his appt in January, but overall they look good. His biopsy from the other day showed over-production of certain types of cells, but is inconclusive for the cause. I would like him to see a dermatologist for further evaluation; I have placed this referral and he should be hearing about this appt soon. Thanks!   CAB

## 2019-01-21 DIAGNOSIS — E87.6 HYPOKALEMIA: ICD-10-CM

## 2019-01-21 DIAGNOSIS — M79.89 LEG SWELLING: ICD-10-CM

## 2019-01-21 RX ORDER — SPIRONOLACTONE 25 MG/1
25 TABLET ORAL
Qty: 30 TAB | Refills: 1 | OUTPATIENT
Start: 2019-01-21

## 2019-01-21 NOTE — TELEPHONE ENCOUNTER
Received faxed refill request from pt's pharmacy for Spironolactone 25 mg tablet. Pt is no longer under our care. Refused refill and faxed message back to [address] [place] [address].  Abel Ma RN

## 2019-01-22 RX ORDER — SPIRONOLACTONE 25 MG/1
25 TABLET ORAL
Qty: 30 TAB | Refills: 1 | Status: SHIPPED | OUTPATIENT
Start: 2019-01-22 | End: 2019-06-29 | Stop reason: SDUPTHER

## 2019-01-23 ENCOUNTER — TELEPHONE (OUTPATIENT)
Dept: FAMILY MEDICINE CLINIC | Age: 69
End: 2019-01-23

## 2019-01-23 NOTE — TELEPHONE ENCOUNTER
----- Message from Paty Calvillo NP sent at 1/22/2019  9:28 PM EST -----  Regarding: RE: Viagra patient asssitance   Sandro Lockwood,    I did not do the paperwork. I think it maybe came from urology? Mandy Castro, please call patient to clarify and then touch base with [facility] [email or web address] Thanks -  CAB    ----- Message -----  From: Janice Arias PHARMD  Sent: 1/17/2019   3:02 PM  To: Farhad Villegas LPN, Paty Calvillo NP  Subject: Viagra patient asssitance                        I received Viagra through [facility] for this patient today. I do not have him in my folders. Just wanted to make sure you want him to have this and did the paperwork? If so, I'll get the documentation set up and call him.   Thanks

## 2019-01-23 NOTE — TELEPHONE ENCOUNTER
----- Message from ROMAN Bravo sent at 1/17/2019  3:02 PM EST -----  Regarding: Viagra patient asssitance   I received Viagra through [facility] for this patient today. I do not have him in my folders. Just wanted to make sure you want him to have this and did the paperwork? If so, I'll get the documentation set up and call him.   Thanks

## 2019-01-23 NOTE — TELEPHONE ENCOUNTER
Per patient he states his patient advocate is the one who completed paper work for viagra to be sent here. Patient apologized for miscommunication. Patient had not called back to get dermatology information so this was shared with him to call and schedule appointment.

## 2019-01-24 NOTE — TELEPHONE ENCOUNTER
Called pt to inform him that medication is here for him to  when he is able. States he will pick it up today.   Nate Fair, NILESD, CDE

## 2019-01-28 ENCOUNTER — TELEPHONE (OUTPATIENT)
Dept: FAMILY MEDICINE CLINIC | Age: 69
End: 2019-01-28

## 2019-01-29 ENCOUNTER — OFFICE VISIT (OUTPATIENT)
Dept: FAMILY MEDICINE CLINIC | Age: 69
End: 2019-01-29

## 2019-01-29 ENCOUNTER — HOSPITAL ENCOUNTER (OUTPATIENT)
Dept: LAB | Age: 69
Discharge: HOME | End: 2019-01-29
Payer: MEDICARE

## 2019-01-29 VITALS
WEIGHT: 247 LBS | BODY MASS INDEX: 37.44 KG/M2 | SYSTOLIC BLOOD PRESSURE: 133 MMHG | TEMPERATURE: 97.5 F | OXYGEN SATURATION: 98 % | RESPIRATION RATE: 16 BRPM | HEART RATE: 70 BPM | DIASTOLIC BLOOD PRESSURE: 73 MMHG | HEIGHT: 68 IN

## 2019-01-29 DIAGNOSIS — J43.9 PULMONARY EMPHYSEMA, UNSPECIFIED EMPHYSEMA TYPE (HCC): ICD-10-CM

## 2019-01-29 DIAGNOSIS — Z12.11 SCREEN FOR COLON CANCER: ICD-10-CM

## 2019-01-29 DIAGNOSIS — G89.29 CHRONIC LEFT-SIDED THORACIC BACK PAIN: ICD-10-CM

## 2019-01-29 DIAGNOSIS — Z13.5 SCREENING FOR GLAUCOMA: ICD-10-CM

## 2019-01-29 DIAGNOSIS — M54.6 CHRONIC LEFT-SIDED THORACIC BACK PAIN: ICD-10-CM

## 2019-01-29 DIAGNOSIS — K74.60 CIRRHOSIS OF LIVER WITHOUT ASCITES, UNSPECIFIED HEPATIC CIRRHOSIS TYPE (HCC): ICD-10-CM

## 2019-01-29 DIAGNOSIS — D69.6 THROMBOCYTOPENIA (HCC): ICD-10-CM

## 2019-01-29 DIAGNOSIS — R97.20 INCREASED PROSTATE SPECIFIC ANTIGEN (PSA) VELOCITY: ICD-10-CM

## 2019-01-29 DIAGNOSIS — E78.2 MIXED HYPERLIPIDEMIA: ICD-10-CM

## 2019-01-29 DIAGNOSIS — R39.9 LOWER URINARY TRACT SYMPTOMS (LUTS): ICD-10-CM

## 2019-01-29 DIAGNOSIS — E03.4 HYPOTHYROIDISM DUE TO ACQUIRED ATROPHY OF THYROID: ICD-10-CM

## 2019-01-29 DIAGNOSIS — R19.5 POSITIVE FIT (FECAL IMMUNOCHEMICAL TEST): ICD-10-CM

## 2019-01-29 DIAGNOSIS — E66.01 SEVERE OBESITY (BMI 35.0-39.9) WITH COMORBIDITY (HCC): ICD-10-CM

## 2019-01-29 DIAGNOSIS — Z00.00 MEDICARE ANNUAL WELLNESS VISIT, SUBSEQUENT: Primary | ICD-10-CM

## 2019-01-29 DIAGNOSIS — I10 ESSENTIAL HYPERTENSION: ICD-10-CM

## 2019-01-29 PROCEDURE — 82270 OCCULT BLOOD FECES: CPT

## 2019-01-29 NOTE — PATIENT INSTRUCTIONS
1. Please call to schedule your appointment with the dermatologist at [phone]. 
2. Please schedule an appointment to have your eyes checked for glaucoma with Dr. José Lee or your eye doctor of choice. 3. Please schedule your abdominal ultrasound by calling [phone]. 
4. You will hear about an appointment with a urologist regarding your prostate. 5. Please send in your FIT test as soon as possible. Well Visit, Over 72: Care Instructions Your Care Instructions Physical exams can help you stay healthy. Your doctor has checked your overall health and may have suggested ways to take good care of yourself. He or she also may have recommended tests. At home, you can help prevent illness with healthy eating, regular exercise, and other steps. Follow-up care is a key part of your treatment and safety. Be sure to make and go to all appointments, and call your doctor if you are having problems. It's also a good idea to know your test results and keep a list of the medicines you take. How can you care for yourself at home? · Reach and stay at a healthy weight. This will lower your risk for many problems, such as obesity, diabetes, heart disease, and high blood pressure. · Get at least 30 minutes of exercise on most days of the week. Walking is a good choice. You also may want to do other activities, such as running, swimming, cycling, or playing tennis or team sports. · Do not smoke. Smoking can make health problems worse. If you need help quitting, talk to your doctor about stop-smoking programs and medicines. These can increase your chances of quitting for good. · Protect your skin from too much sun. When you're outdoors from 10 a.m. to 4 p.m., stay in the shade or cover up with clothing and a hat with a wide brim. Wear sunglasses that block UV rays. Even when it's cloudy, put broad-spectrum sunscreen (SPF 30 or higher) on any exposed skin. · See a dentist one or two times a year for checkups and to have your teeth cleaned. · Wear a seat belt in the car. · Limit alcohol to 2 drinks a day for men and 1 drink a day for women. Too much alcohol can cause health problems. Follow your doctor's advice about when to have certain tests. These tests can spot problems early. For men and women · Cholesterol. Your doctor will tell you how often to have this done based on your overall health and other things that can increase your risk for heart attack and stroke. · Blood pressure. Have your blood pressure checked during a routine doctor visit. Your doctor will tell you how often to check your blood pressure based on your age, your blood pressure results, and other factors. · Diabetes. Ask your doctor whether you should have tests for diabetes. · Vision. Experts recommend that you have yearly exams for glaucoma and other age-related eye problems. · Hearing. Tell your doctor if you notice any change in your hearing. You can have tests to find out how well you hear. · Colon cancer tests. Keep having colon cancer tests as your doctor recommends. You can have one of several types of tests. · Heart attack and stroke risk. At least every 4 to 6 years, you should have your risk for heart attack and stroke assessed. Your doctor uses factors such as your age, blood pressure, cholesterol, and whether you smoke or have diabetes to show what your risk for a heart attack or stroke is over the next 10 years. · Osteoporosis. Talk to your doctor about whether you should have a bone density test to find out whether you have thinning bones. Also ask your doctor about whether you should take calcium and vitamin D supplements. For women · Pap test and pelvic exam. You may no longer need a Pap test. Talk with your doctor about whether to stop or continue to have Pap tests.  
· Breast exam and mammogram. Ask how often you should have a mammogram, which is an X-ray of your breasts. A mammogram can spot breast cancer before it can be felt and when it is easiest to treat. · Thyroid disease. Talk to your doctor about whether to have your thyroid checked as part of a regular physical exam. Women have an increased chance of a thyroid problem. For men · Prostate exam. Talk to your doctor about whether you should have a blood test (called a PSA test) for prostate cancer. Experts disagree on whether men should have this test. Some experts recommend that you discuss the benefits and risks of the test with your doctor. · Abdominal aortic aneurysm. Ask your doctor whether you should have a test to check for an aneurysm. You may need a test if you ever smoked or if your parent, brother, sister, or child has had an aneurysm. When should you call for help? Watch closely for changes in your health, and be sure to contact your doctor if you have any problems or symptoms that concern you. Where can you learn more? Go to http://buffy-christiano.info/. Enter X959 in the search box to learn more about \"Well Visit, Over 65: Care Instructions. \" Current as of: March 28, 2018 Content Version: 11.9 © 9108-8148 [employer]. Care instructions adapted under license by [employer] (which disclaims liability or warranty for this information). If you have questions about a medical condition or this instruction, always ask your healthcare professional. [address] any warranty or liability for your use of this information.

## 2019-01-29 NOTE — PROGRESS NOTES
This is the Subsequent Medicare Annual Wellness Exam, performed 12 months or more after the Initial AWV or the last Subsequent AWV I have reviewed the patient's medical history in detail and updated the computerized patient record. History Past Medical History:  
Diagnosis Date  Blurred vision  Cirrhosis, alcoholic ([address]  COPD bronchitis  Ear discomfort  History of ETOH abuse  HTN (hypertension)  SOB (shortness of breath)  Thrombocytopenia ([address]  Thyroid disease  Trouble in sleeping Past Surgical History:  
Procedure Laterality Date  HX CATARACT REMOVAL Right 2007  HX CATARACT REMOVAL Left 2012  HX CIRCUMCISION    
 at 24 y/o  
 HX HERNIA REPAIR  as a youth  
 right side  HX SHOULDER ARTHROSCOPY    
 left x 3 for torn rotator cuff muscle Current Outpatient Medications Medication Sig Dispense Refill  spironolactone (ALDACTONE) 25 mg tablet Take 1 Tab by mouth daily as needed. 30 Tab 1  
 magnesium oxide 400 mg cap 1qd for toe cramps 90 Cap 2  
 ergocalciferol (ERGOCALCIFEROL) 50,000 unit capsule Take 1 Cap by mouth every seven (7) days. For 8 weeks and then once monthly. 12 Cap 3  
 tiotropium (SPIRIVA) 18 mcg inhalation capsule Take 1 Cap by inhalation daily. 30 Cap 11  
 metoprolol tartrate (LOPRESSOR) 50 mg tablet TAKE ONE TABLET BY MOUTH TWICE A  Tab 1  
 losartan (COZAAR) 100 mg tablet TAKE ONE TABLET BY MOUTH DAILY 90 Tab 1  
 levothyroxine (SYNTHROID) 88 mcg tablet TAKE ONE TABLET BY MOUTH EVERY MORNING BEFORE BREAKFAST 90 Tab 3  
 lovastatin (MEVACOR) 20 mg tablet Take 1 Tab by mouth nightly. 90 Tab 1  
 aspirin delayed-release 81 mg tablet Take  by mouth daily.  terazosin (HYTRIN) 1 mg capsule TAKE ONE CAPSULE BY MOUTH ONCE NIGHTLY 30 Cap 5  
 montelukast (SINGULAIR) 10 mg tablet Take 1 Tab by mouth daily.  90 Tab 3  
 ipratropium (ATROVENT) 0.02 % soln 2.5 mL by Nebulization route four (4) times daily as needed. 180 mL 5  
 fluticasone-salmeterol (ADVAIR DISKUS) 500-50 mcg/dose diskus inhaler Take 1 Puff by inhalation every twelve (12) hours.  diclofenac EC (VOLTAREN) 50 mg EC tablet Take 1 Tab by mouth two (2) times daily as needed. Take with food or milk. 60 Tab 1  
 albuterol (PROVENTIL VENTOLIN) 2.5 mg /3 mL (0.083 %) nebulizer solution 3 mL by Nebulization route every four (4) hours as needed for Wheezing. 180 Each 5  
 VIAGRA 100 mg tablet Take 1 Tab by mouth as needed. 30 Tab 3 Allergies Allergen Reactions  Bactrim [Sulfamethoprim Ds] Diarrhea and Nausea and Vomiting After on 1st dose.  Morphine Unknown (comments)  
  itching Family History Problem Relation Age of Onset  Hypertension Father  Heart Disease Father   
     pacemaker,  of MI @ 80  Hypertension Mother  Cancer Mother   
     breast with spread to bones  Diabetes Neg Hx Social History Tobacco Use  Smoking status: Former Smoker Types: Cigarettes Last attempt to quit: 2014 Years since quittin.2  Smokeless tobacco: Former User  Tobacco comment: uses vap Substance Use Topics  Alcohol use: No  
  Alcohol/week: 0.0 oz  
  Comment: Quit Oct 21, 1985 Patient Active Problem List  
Diagnosis Code  Obstructive sleep apnea (adult) (pediatric) G47.33  
 COPD (chronic obstructive pulmonary disease) ([facility]) J44.9  Cirrhosis ([facility] K74.60  
 History of ETOH abuse Z87.898  Thrombocytopenia ([facility] D69.6  Lower urinary tract symptoms (LUTS) R39.9  Hypothyroidism due to acquired atrophy of thyroid E03.4  Essential hypertension I10  Chronic left-sided thoracic back pain M54.6, G89.29  Severe obesity (BMI 35.0-39. 9) with comorbidity ([facility] E66.01  
 Angina, class III (HCC) I20.9  Abnormal stress test R94.39  Leg swelling M79.89  
 Mixed hyperlipidemia E78.2 Depression Risk Factor Screening: PHQ over the last two weeks 1/29/2019 Little interest or pleasure in doing things Not at all Feeling down, depressed, irritable, or hopeless Not at all Total Score PHQ 2 0 Alcohol Risk Factor Screening: You do not drink alcohol or very rarely. Functional Ability and Level of Safety:  
Hearing Loss Hearing is good. Activities of Daily Living The home contains: no safety equipment. Patient does total self care Fall Risk Fall Risk Assessment, last 12 mths 1/29/2019 Able to walk? Yes Fall in past 12 months? No  
 
 
Abuse Screen Patient is not abused Cognitive Screening Evaluation of Cognitive Function: 
Has your family/caregiver stated any concerns about your memory: yes Normal, Clock Drawing test, Mini Cog test 
 
Patient Care Team  
Patient Care Team: 
Paige Andrade NP as PCP - General (Nurse Practitioner) Sylvie Higginbotham MD (Cardiology) Melinda Owens MD (Hepatology) Theron Talley MD (Family Practice) Meg Zamudio NP (Nurse Practitioner) Maryse Abernathy MD as Physician (Cardiology) Assessment/Plan Education and counseling provided: 
Are appropriate based on today's review and evaluation End-of-Life planning (with patient's consent) Prostate cancer screening tests (PSA, covered annually) Colorectal cancer screening tests Screening for glaucoma Diagnoses and all orders for this visit: 
 
1. Medicare annual wellness visit, subsequent 2. Pulmonary emphysema, unspecified emphysema type ([address] 3. Mixed hyperlipidemia 4. Hypothyroidism due to acquired atrophy of thyroid 5. Essential hypertension 6. Lower urinary tract symptoms (LUTS) 7. Thrombocytopenia ([address] 8. Cirrhosis of liver without ascites, unspecified hepatic cirrhosis type ([address] 9. Severe obesity (BMI 35.0-39. 9) with comorbidity ([address] Referred to optomery for glaucoma screening. Can't get ride to colonoscopy; FIT testing ordered. Health Maintenance Due Topic Date Due  
 COLONOSCOPY  02/19/1969  Shingrix Vaccine Age 50> (1 of 2) 02/19/2001  GLAUCOMA SCREENING Q2Y  02/19/2016  MEDICARE YEARLY EXAM  03/14/2018

## 2019-01-29 NOTE — PROGRESS NOTES
HISTORY OF PRESENT ILLNESS Susana Daley is a 80 y.o. male. HPI The patient presents today to lab/med follow-up and AWV. Previous PCP was Raymundo Salomon NP. Last office visit October 2018. 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 PSHx is significant for rotator cuff surgeries, cataract surgery, hernia repair. FHx is significant for heart disease. Was referred after his last visit to dermatology, but he has not yet scheduled that appt. Was also referred for colonoscopy at his last visit, but reports that he does not have a ride so isn't sure he'll be able to get that done. COPD with emphysema Takes spiriva 18mcg once daily, advair 500-50mcg twice daily, and uses albuterol and atrovent twice daily for COPD. He also uses the albuterol PRN, but he has not needed it. Takes singulair 10mg daily as well. He is a former smoker. He quit in approximately 2014. Was previously smoking 1/2 - 1ppd. Does not see pulmonology. Denies history of intubation. HTN Patient takes losartan 100mg daily and metoprolol 50mg BID regularly. Patient does not check blood pressure at home routinely. Denies chest pain, shortness of breath, headache, lightheadedness, peripheral edema. Blood pressure in office today is 133/73, which is at goal.  
Takes spironolactone 25mg PRN edema. Saw Dr. Danya Wang in May 2018 and had a normal cardiac cath after having an abnormal stress echo. Following up in 1 year. Hyperlipidemia Takes lovastatin 20mg once daily. Denies RUQ pain or myalgias. Not exercising. Working on diet. Recent lipid panel (Dec 2018) is excellent. Thrombocytopenia 2/2 alcoholic cirrhosis PLT 99 Dec 2018, up from 80 in July 2018. Denies any abnormal bleeding. Alcoholic cirrhosis He has a history of alcoholic cirrhosis. He quit drinking in [address] [place] [address] after a history of heavy drinking prior to that. He was hospitalized in 2010 and was told \"it's not that bad. \" He does not see a liver/GI specialist.  
Last abd ultrasound was in August 2013, which showed hepatic cirrhosis with no focal abnormality to suggest neoplasm, per chart review. Recent LFTs were normal with exception of elevated bilirubin at 1.8. Hypothyroidism Takes levothyroxine 88 mcg once daily. His hypothyroidism was diagnosed in 2010. Most recent TSH was 1.970 in Dec 2018. Denies fatigue, weight changes, heat/cold intolerance, bowel/skin changes. BPH Has a history of BPH, but patient states \"they haven't told me I have it. \" He takes terazosin 1mg nightly. Denies family history of prostate CA. Gets up 0 - 1 times nightly to urinate. Recent PSA (Dec 2018) was 1.7, which is increased from 0.6 one year prior. Acute on chronic thoracic back pain Has acute on chronic thoracic back pain. Reports \"I know I have a pinched disc. \" Wondering if there is anything he can take for it. No falls or injury. No numbness/tingling or change in bowel/bladder habits. Was started on diclofenac at his last visit, but reports that it is not helping. Reports that he has been to back specialists before, \"burst a disc in between L3 and L4 in 1993\". Was told that surgery would be his only option and that it had only a 25% chance of working and a higher chance of paralysis. Has not seen an orthopedic specialist since the mid-1990s. Had a normal thoracic X-ray in July 2017. Current Specialists: 1. Dr. Marbella Frankel, cardiology - last visit Aug 2018, return in 1 yr Preventative Care TDaP: Dec 2018 Colonoscopy: 1969, per patient report; \"I ain't never had no problems\" but was referred at his last visit; has not yet scheduled due to lack of transportation Healthy Habits Patient is exercising 0x per week, \"I don't have the breath to do that. \"  
Patient endorses mostly healthy diet; avoids fatty/greasy foods, sugar-sweetened beverages. Denies tobacco use. He quit in . Denies alcohol use; former alcoholic and quit in 911. Denies illicit drug use. Sexually active with 1 female partner in last 12 months. Uses viagra PRN for ED, about 4x weekly. Lab Review Normals: HgA1C (5.3%), TSH, UA, lipid panel, PSA (1.7, but up from 0.6 in 2017) Abnormals: CMP (bilirubin 1.8), CBC (PLT 99, but stable from 2018 when they were 81) Past Medical History:  
Diagnosis Date  Blurred vision  Cirrhosis, alcoholic ([address]  COPD bronchitis  Ear discomfort  History of ETOH abuse  HTN (hypertension)  SOB (shortness of breath)  Thrombocytopenia ([address]  Thyroid disease  Trouble in sleeping Past Surgical History:  
Procedure Laterality Date  HX CATARACT REMOVAL Right   HX CATARACT REMOVAL Left   HX CIRCUMCISION    
 at 26 y/o  
 HX HERNIA REPAIR  as a youth  
 right side  HX SHOULDER ARTHROSCOPY    
 left x 3 for torn rotator cuff muscle Family History Problem Relation Age of Onset  Hypertension Father  Heart Disease Father   
     pacemaker,  of MI @ 80  Hypertension Mother  Cancer Mother   
     breast with spread to bones  Diabetes Neg Hx Social History Socioeconomic History  Marital status:  Spouse name: Not on file  Number of children: Not on file  Years of education: Not on file  Highest education level: Not on file Tobacco Use  Smoking status: Former Smoker Types: Cigarettes Last attempt to quit: 2014 Years since quittin.2  Smokeless tobacco: Former User  Tobacco comment: uses vap Substance and Sexual Activity  Alcohol use: No  
  Alcohol/week: 0.0 oz  
  Comment: Quit Oct 21, 1985  Drug use: No  
 Sexual activity: Yes  
  Partners: Female Birth control/protection: None Patient Active Problem List  
Diagnosis Code  Obstructive sleep apnea (adult) (pediatric) G47.33  
  COPD (chronic obstructive pulmonary disease) ([facility]) J44.9  Cirrhosis ([facility] K74.60  
 History of ETOH abuse Z87.898  Thrombocytopenia ([facility] D69.6  Lower urinary tract symptoms (LUTS) R39.9  Hypothyroidism due to acquired atrophy of thyroid E03.4  Essential hypertension I10  Chronic left-sided thoracic back pain M54.6, G89.29  Severe obesity (BMI 35.0-39. 9) with comorbidity ([facility] E66.01  
 Angina, class III ([facility]) I20.9  Abnormal stress test R94.39  Leg swelling M79.89  
 Mixed hyperlipidemia E78.2  Increased prostate specific antigen (PSA) velocity R97.20 Outpatient Encounter Medications as of 1/29/2019 Medication Sig Dispense Refill  spironolactone (ALDACTONE) 25 mg tablet Take 1 Tab by mouth daily as needed. 30 Tab 1  
 magnesium oxide 400 mg cap 1qd for toe cramps 90 Cap 2  
 ergocalciferol (ERGOCALCIFEROL) 50,000 unit capsule Take 1 Cap by mouth every seven (7) days. For 8 weeks and then once monthly. 12 Cap 3  
 tiotropium (SPIRIVA) 18 mcg inhalation capsule Take 1 Cap by inhalation daily. 30 Cap 11  
 metoprolol tartrate (LOPRESSOR) 50 mg tablet TAKE ONE TABLET BY MOUTH TWICE A  Tab 1  
 losartan (COZAAR) 100 mg tablet TAKE ONE TABLET BY MOUTH DAILY 90 Tab 1  
 levothyroxine (SYNTHROID) 88 mcg tablet TAKE ONE TABLET BY MOUTH EVERY MORNING BEFORE BREAKFAST 90 Tab 3  
 lovastatin (MEVACOR) 20 mg tablet Take 1 Tab by mouth nightly. 90 Tab 1  
 aspirin delayed-release 81 mg tablet Take  by mouth daily.  terazosin (HYTRIN) 1 mg capsule TAKE ONE CAPSULE BY MOUTH ONCE NIGHTLY 30 Cap 5  
 montelukast (SINGULAIR) 10 mg tablet Take 1 Tab by mouth daily. 90 Tab 3  
 ipratropium (ATROVENT) 0.02 % soln 2.5 mL by Nebulization route four (4) times daily as needed. 180 mL 5  
 fluticasone-salmeterol (ADVAIR DISKUS) 500-50 mcg/dose diskus inhaler Take 1 Puff by inhalation every twelve (12) hours.  diclofenac EC (VOLTAREN) 50 mg EC tablet Take 1 Tab by mouth two (2) times daily as needed. Take with food or milk. 60 Tab 1  
 albuterol (PROVENTIL VENTOLIN) 2.5 mg /3 mL (0.083 %) nebulizer solution 3 mL by Nebulization route every four (4) hours as needed for Wheezing. 180 Each 5  
 VIAGRA 100 mg tablet Take 1 Tab by mouth as needed. 30 Tab 3 No facility-administered encounter medications on file as of 1/29/2019. Visit Vitals /73 (BP 1 Location: Right arm, BP Patient Position: Sitting) Pulse 70 Temp 97.5 °F (36.4 °C) (Oral) Resp 16 Ht 5' 8\" (1.727 m) Wt 247 lb (112 kg) SpO2 98% BMI 37.56 kg/m² ROS Constitutional: denies fevers, chills, fatigue, unintentional weight loss Skin: denies rashes, itching, concerning/changing lesions HENT: denies ear pain, hearing loss, sore throat, congestion Eye: denies eye pain, blurred/double vision, eye discharge/redness Cardiovascular: denies chest pain, palpitations, peripheral edema, orthopnea, claudication Pulmonary: denies cough, shortness of breath, wheezing GI: denies heartburn, nausea, vomiting, diarrhea, constipation, rectal bleeding, abdominal pain : denies dysuria, hematuria MS: denies frequent/unexplained falls, chronic left-sided thoracic back pain Neuro: denies headaches, lightheadedness, numbness/tingling, seizures, sensory/strength changes Psychiatry: denies depression, anxiety, insomnia, memory loss Physical Exam 
Visit Vitals /73 (BP 1 Location: Right arm, BP Patient Position: Sitting) Pulse 70 Temp 97.5 °F (36.4 °C) (Oral) Resp 16 Ht 5' 8\" (1.727 m) Wt 247 lb (112 kg) SpO2 98% BMI 37.56 kg/m² Constitutional: alert, oriented, no acute distress HENT: normocephalic, atraumatic; ears normal bilaterally; nose normal; oropharynx clear and moist; no teeth Eyes: PERRL, EOM intact, conjunctivae normal, no discharge Neck: no thyromegaly, no cervical, submandibular, submental, occipital, supraclavicular lymphadenopathy Cardiovascular: normal rate, regular rhythm, no murmurs noted; radial, femoral, and pedal pulses normal bilaterally Pulmonary: lungs clear to auscultation bilaterally with no wheezing, rhonchi, or increased work of breathing Abdomen: soft, non-tender; + bowel sounds; no masses, or organomegaly noted; + mild distention : normal testicular and penile exam; rectal exam declined; no inguinal lymphadenopathy or hernias MSK: normal gait, full ROM of all extremities Neurological: alert, oriented, CN II - XII grossly intact; DTRs 2+ bilaterally Skin: warm, dry, no rashes or suspicious lesions noted with exception of 1cm x 1cm white raised lesion left posterior hand Psych: mood/affect normal, behavior normal 
 
 
ASSESSMENT and PLAN 
  ICD-10-CM ICD-9-CM 1. Medicare annual wellness visit, subsequent Z00.00 V70.0 2. Pulmonary emphysema, unspecified emphysema type ([facility] J43.9 492.8 3. Mixed hyperlipidemia Z62.0 016.0 METABOLIC PANEL, COMPREHENSIVE  
   LIPID PANEL AND CHOL/HDL RATIO 4. Hypothyroidism due to acquired atrophy of thyroid E03.4 244.8 TSH RFX ON ABNORMAL TO FREE T4  
  246.8 5. Essential hypertension I10 401.9 6. Lower urinary tract symptoms (LUTS) R39.9 788.99 REFERRAL TO UROLOGY URINALYSIS W/ RFLX MICROSCOPIC 7. Thrombocytopenia (HCC) D69.6 287.5 CBC W/O DIFF 8. Cirrhosis of liver without ascites, unspecified hepatic cirrhosis type ([facility] K74.60 571.5 [facility] METABOLIC PANEL, COMPREHENSIVE URINALYSIS W/ RFLX MICROSCOPIC 9. Severe obesity (BMI 35.0-39. 9) with comorbidity ([facility] E66.01 278.01   
10. Increased prostate specific antigen (PSA) velocity R97.20 790.93 REFERRAL TO UROLOGY 11. Screen for colon cancer Z12.11 V76.51 OCCULT BLOOD IMMUNOASSAY,DIAGNOSTIC 12. Chronic left-sided thoracic back pain M54.6 724.1 G89.29 338.29   
13. Screening for glaucoma Z13.5 V80.1 REFERRAL TO OPTOMETRY 1. Medicare Wellness Visit - Completed; see separate note. 2. COPD- Well-controlled; continue current medications. 3. Hypertension - Well-controlled; continue current medications. 4. Hypothyroidism - TFTs at goal; continue current medications. 5. Cirrhosis of the liver - Elevated bilirubin. As he has not had an ultrasound in > 5 years, will send for abd ultrasound to ensure stability. 6. Thrombocytopenia - Due to cirrhosis. PLT count stable; continue to monitor. 7. Increased PSA - PSA remains within normal limits, but has increased > 1 pt in a year. Referred to urology for further evaluation. Declined rectal exam today. 8. Acute on chronic back pain - We discussed referral to ortho/spine, referral to PT, but he declines all of these. I discussed that just because he would go see ortho doesn't mean he will necessarily have surgery, but his pain may be able to be controlled in another way (ie. Injections). He declines today. X-ray from July 2017 reviewed, which was normal. 
9. Preventative Care - encouraged diet/exercise changes for weight loss. Referred to optometry for glaucoma screen. FIT test ordered as he does not have transportation for colonoscopy. Encouraged him to schedule appt with dermatology for neoplasm on right posterior hand, as discussed at previous appt. Return in 4 months for fasting labs and lab/med f/u 1 week later, sooner PRN. Follow-up Disposition: 
Return in about 4 months (around 5/29/2019) for fasting labs and lab/med f/u 1 week later, sooner PRN.

## 2019-02-01 ENCOUNTER — TELEPHONE (OUTPATIENT)
Dept: FAMILY MEDICINE CLINIC | Age: 69
End: 2019-02-01

## 2019-02-01 ENCOUNTER — DOCUMENTATION ONLY (OUTPATIENT)
Dept: FAMILY MEDICINE CLINIC | Age: 69
End: 2019-02-01

## 2019-02-01 ENCOUNTER — HOSPITAL ENCOUNTER (OUTPATIENT)
Dept: ULTRASOUND IMAGING | Age: 69
Discharge: HOME | End: 2019-02-01
Attending: NURSE PRACTITIONER
Payer: MEDICARE

## 2019-02-01 DIAGNOSIS — K74.60 CIRRHOSIS OF LIVER WITHOUT ASCITES, UNSPECIFIED HEPATIC CIRRHOSIS TYPE (HCC): ICD-10-CM

## 2019-02-01 PROCEDURE — 76705 ECHO EXAM OF ABDOMEN: CPT

## 2019-02-01 NOTE — PROGRESS NOTES
Please let patient know that his abdominal ultrasound is stable. We will continue to monitor his liver enzymes, but no need to change anything at this time. Thanks!   CAB

## 2019-02-01 NOTE — PROGRESS NOTES
01-31-19: a PAP shipment sent out on 01-25-19 of 3 Advair Diskus, 500/50 for this former [facility] patient was picked up from the [facility]'s administration office. Per chart review, he has Medicare A & B and has established care at the [facility]. Routing to the provider for permission to place these Advair Diskus into CAV stock.  Reid Novak RN

## 2019-02-01 NOTE — TELEPHONE ENCOUNTER
Patient notified of ultrasound results. Patient cancelled urology due to cost.  Urology wants 150 dollars up front per patient. Will forward message to pcp for review.

## 2019-02-01 NOTE — TELEPHONE ENCOUNTER
----- Message from Jillian Bansal NP sent at 2/1/2019  8:40 AM EST -----  Please let patient know that his abdominal ultrasound is stable. We will continue to monitor his liver enzymes, but no need to change anything at this time. Thanks!   CAB

## 2019-02-04 NOTE — PROGRESS NOTES
Telephone call made to the patient to schedule delivery of his PAP Advair order (per Dr. Sita Dennison reply). The patient stated he will come to our [address] [place] [address] clinic on 02-13-19 between 9am and 3pm. None of the earlier clinic dates were convenient for him. Jessy Moreno RN    Roseann Mason  Male, 80 y.o., 1950       Message   Received: Today   Message Contents   MD Adam Brower RN   Cc: DARIEL Felix; Christine Spivey NP; Pennelope Duane, MD; Thee Moseley MD; Evelyn Woodruff MD   Caller: Unspecified (3 days ago,  5:13 PM)             Yes. Previous Messages               Medication Management (PAP)     Tiera Toledo MD   You; DARIEL Felix; Christine Spivey NP; Pennelope Duane, MD; Thee Moseley MD; Evelyn Woodruff MD 44 minutes ago (11:28 AM)      Yes. (Routing comment)       Pennelope Duane, MD Albertine Koller, MD; DARIEL Felix; Christine Spivey NP; Thee Moseley MD; Evelyn Woodruff MD 1 hour ago (10:41 AM)      [facility] by me to give pt the advair since he is out of the med currently, unless you want one of us to try to prescribe it to his pharmacy to see if covered by his current insurance? ? I'm happy to send rx today if needed. Truly, AP (Routing comment)       Annette Vargas MD; DARIEL Felix; Christine Spivey NP; Pennelope Duane, MD; Thee Moseley MD; Evelyn Woodruff MD 1 hour ago (10:14 AM)      Dr. Alex Solis,     I received an email from [facility] Sat today about this patient's Advair shipment. She is aware that he has moved to another practice and stated that this Advair order was supposed to be shipped to his home. [address] [place] [address] made a shipping error and will not send the patient more Advair unless we return the shipment we have to them. Per Rolando Rodriguez, the patient has been out of Advair for a week and she hopes that we can arrange to give him the order we received soon. Otherwise, he will have to wait even longer while we ship the order back to [address] [place] [address] and then they send a new shipment out to his home. Altogether an unusual situation. Ok to give him the order we have?       Thank you,     Asha (Routing comment)       George Camilo MD   You; DARIEL Marquez; Subha Hill NP; Andressa Osuna MD; Deja Mackey MD; Guicho Waldron MD Yesterday (7:53 AM)      Yes, this may go to stock.  Providers, we have advair in stock closet! (Routing comment)       You routed conversation to George Camilo MD 3 days ago      You 3 days ago         01-31-19: a PAP shipment sent out on 01-25-19 of 3 Advair Diskus, 500/50 for this former [facility] patient was picked up from the [facility]'s administration office. Per chart review, he has Medicare A & B and has established care at the [facility]. Routing to the provider for permission to place these Advair Diskus into CAV stock.  Roland Villanueva RN         Unsigned   Documentation

## 2019-02-05 LAB — HEMOCCULT STL QL IA: POSITIVE

## 2019-02-07 ENCOUNTER — TELEPHONE (OUTPATIENT)
Dept: FAMILY MEDICINE CLINIC | Age: 69
End: 2019-02-07

## 2019-02-07 NOTE — PROGRESS NOTES
Please let patient know that his FIT test was positive for blood. At this point, it will be very important that he has a colonoscopy done. I have placed a referral to GI, and he should hear about that appt soon. If he does not hear anything within 1 week, please have him contact our office.  
Sandee Genao NP

## 2019-02-07 NOTE — TELEPHONE ENCOUNTER
----- Message from Yesenia Mcginnis NP sent at 2/7/2019  7:27 AM EST -----  Please let patient know that his FIT test was positive for blood. At this point, it will be very important that he has a colonoscopy done. I have placed a referral to GI, and he should hear about that appt soon. If he does not hear anything within 1 week, please have him contact our office.   Yesenia Mcginnis NP

## 2019-02-12 DIAGNOSIS — J44.9 CHRONIC OBSTRUCTIVE PULMONARY DISEASE, UNSPECIFIED COPD TYPE (HCC): ICD-10-CM

## 2019-02-13 ENCOUNTER — CLINICAL SUPPORT (OUTPATIENT)
Dept: FAMILY MEDICINE CLINIC | Age: 69
End: 2019-02-13

## 2019-02-13 DIAGNOSIS — Z71.9 COUNSELED BY NURSE: Primary | ICD-10-CM

## 2019-02-13 RX ORDER — IPRATROPIUM BROMIDE 0.5 MG/2.5ML
0.5 SOLUTION RESPIRATORY (INHALATION)
Qty: 180 ML | Refills: 5 | Status: SHIPPED | OUTPATIENT
Start: 2019-02-13 | End: 2019-02-14 | Stop reason: SDUPTHER

## 2019-02-13 NOTE — PROGRESS NOTES
Pt came to clinic and picked up PAP Advair Diskus 500/50mcg 3 boxes. Pt verified name and date of birth and proper instructions for medication.  Jose Willis RN

## 2019-02-14 DIAGNOSIS — J44.9 CHRONIC OBSTRUCTIVE PULMONARY DISEASE, UNSPECIFIED COPD TYPE (HCC): ICD-10-CM

## 2019-02-14 RX ORDER — IPRATROPIUM BROMIDE 0.5 MG/2.5ML
0.5 SOLUTION RESPIRATORY (INHALATION)
Qty: 375 ML | Refills: 1 | Status: SHIPPED | OUTPATIENT
Start: 2019-02-14 | End: 2019-08-29 | Stop reason: SDUPTHER

## 2019-02-18 DIAGNOSIS — R35.0 BENIGN PROSTATIC HYPERPLASIA WITH URINARY FREQUENCY: ICD-10-CM

## 2019-02-18 DIAGNOSIS — N40.1 BENIGN PROSTATIC HYPERPLASIA WITH URINARY FREQUENCY: ICD-10-CM

## 2019-02-18 RX ORDER — TERAZOSIN 1 MG/1
CAPSULE ORAL
Qty: 30 CAP | Refills: 5 | Status: SHIPPED | OUTPATIENT
Start: 2019-02-18 | End: 2019-08-19 | Stop reason: SDUPTHER

## 2019-02-25 DIAGNOSIS — J44.9 CHRONIC OBSTRUCTIVE PULMONARY DISEASE, UNSPECIFIED COPD TYPE (HCC): ICD-10-CM

## 2019-02-25 RX ORDER — ALBUTEROL SULFATE 0.83 MG/ML
2.5 SOLUTION RESPIRATORY (INHALATION)
Qty: 180 EACH | Refills: 5 | Status: SHIPPED | OUTPATIENT
Start: 2019-02-25 | End: 2019-08-29 | Stop reason: SDUPTHER

## 2019-03-15 DIAGNOSIS — J32.9 CHRONIC SINUSITIS, UNSPECIFIED LOCATION: ICD-10-CM

## 2019-03-15 DIAGNOSIS — J44.9 CHRONIC OBSTRUCTIVE PULMONARY DISEASE, UNSPECIFIED COPD TYPE (HCC): ICD-10-CM

## 2019-03-15 RX ORDER — MONTELUKAST SODIUM 10 MG/1
10 TABLET ORAL DAILY
Qty: 90 TAB | Refills: 3 | Status: SHIPPED | OUTPATIENT
Start: 2019-03-15 | End: 2019-08-29 | Stop reason: SDUPTHER

## 2019-03-15 NOTE — TELEPHONE ENCOUNTER
Last Visit: 1/29/19  Next Appt: 5/21/19  Previous Refill Encounter: 2/15/18-90+3    Requested Prescriptions     Pending Prescriptions Disp Refills    montelukast (SINGULAIR) 10 mg tablet 90 Tab 3     Sig: Take 1 Tab by mouth daily.

## 2019-03-21 DIAGNOSIS — M54.6 CHRONIC LEFT-SIDED THORACIC BACK PAIN: ICD-10-CM

## 2019-03-21 DIAGNOSIS — G89.29 CHRONIC LEFT-SIDED THORACIC BACK PAIN: ICD-10-CM

## 2019-03-21 RX ORDER — DICLOFENAC SODIUM 50 MG/1
TABLET, DELAYED RELEASE ORAL
Qty: 60 TAB | Refills: 0 | Status: SHIPPED | OUTPATIENT
Start: 2019-03-21 | End: 2019-04-18 | Stop reason: SDUPTHER

## 2019-03-26 DIAGNOSIS — E87.6 HYPOKALEMIA: ICD-10-CM

## 2019-03-26 RX ORDER — LOSARTAN POTASSIUM 100 MG/1
TABLET ORAL
Qty: 90 TAB | Refills: 1 | Status: SHIPPED | OUTPATIENT
Start: 2019-03-26 | End: 2019-08-29 | Stop reason: SDUPTHER

## 2019-03-26 NOTE — TELEPHONE ENCOUNTER
Last Visit: 1/29/19  Next Appt: 5/21/19  Previous Refill Encounter: 9/10-90+1    Requested Prescriptions     Pending Prescriptions Disp Refills    losartan (COZAAR) 100 mg tablet 90 Tab 1     Sig: TAKE ONE TABLET BY MOUTH DAILY

## 2019-04-01 ENCOUNTER — TELEPHONE (OUTPATIENT)
Dept: FAMILY MEDICINE CLINIC | Age: 69
End: 2019-04-01

## 2019-04-01 NOTE — TELEPHONE ENCOUNTER
30 pills of viagra 100mg received from PAP. Verified patient with two types of identifiers. Notified patient that his PAP medication is ready to be picked up. He will  this week. He mentioned that he is having stomach pains only at night and states that the upper part of his stomach is hard at night. Asked if he has had his colonoscopy or seen GI as of yet and he states that he has not as he can not afford to have this done. He states that they want $150 up front. He has been taking an OTC chewable gas and indigestion pill and this makes him burp and relieves the pain. He is having to take the chews every day and wants to know if there is anything else he can do besides the chews. Will check with NP. Advised patient of the importance of have GI take a look at him but he states that he just can not afford to do this.

## 2019-04-02 ENCOUNTER — DOCUMENTATION ONLY (OUTPATIENT)
Dept: FAMILY MEDICINE CLINIC | Age: 69
End: 2019-04-02

## 2019-04-02 NOTE — PROGRESS NOTES
Received fax from [employer] stating due for refill order for PAP medication Viagra. Called and requested refill; stated that the medication would be mailed on 6/1/19.   Dary Tuttle, PHARMD, CDE

## 2019-04-03 NOTE — TELEPHONE ENCOUNTER
Per Thom Sanabria NP:  He can try OTC zantac 150mg BID as needed, but I agree with the need for GI evaluation. Attempted to reach patient by telephone. A message was left for return call.

## 2019-04-03 NOTE — TELEPHONE ENCOUNTER
Patient notified of recommendation for GI consult. Patient states this only occurs in the evening if he eats too much. Recommend smaller portions. Patient states burping helps. Patient notes he if takes his Miralax he's okay if not he is constipated. Recommend patient takes his Miralax as needed and burp if he feels it helps. Patient notified to try OTC Zantac 150 mg one tab bid as needed. Again reminded patient that unless we have evaulation from GI we can not fully treat symptoms. Patient understands.

## 2019-04-11 DIAGNOSIS — N52.9 ERECTILE DYSFUNCTION, UNSPECIFIED ERECTILE DYSFUNCTION TYPE: ICD-10-CM

## 2019-04-11 RX ORDER — SILDENAFIL CITRATE 100 MG/1
100 TABLET, FILM COATED ORAL AS NEEDED
Qty: 30 TAB | Refills: 0
Start: 2019-04-11 | End: 2019-06-14 | Stop reason: SDUPTHER

## 2019-04-11 NOTE — TELEPHONE ENCOUNTER
Patient came in today to  Viagra patient assistance medication. States that the medication that he was put on for his back pain is not helping. He has not noticed a change in the pain. He describes the pain as a pinch in his upper left side of his back just below the shoulder blade. He has been taking the voltaren as prescribed. Will inform NP and call with any changes.

## 2019-04-12 NOTE — TELEPHONE ENCOUNTER
Call placed to pt. Advised per  Emilie Zambrano NP message.   Pt stated understanding, declines appt on Monday 4/15/19, message sent to [facility]. lpn

## 2019-04-12 NOTE — TELEPHONE ENCOUNTER
Per pcp patient needs to schedule an office visit to re evaluate pain that patient states is not better. Have availability at this time for 4/26/19 but need patient to contact office before scheduling appointment.

## 2019-04-17 DIAGNOSIS — E11.9 TYPE 2 DIABETES MELLITUS WITHOUT COMPLICATION, WITHOUT LONG-TERM CURRENT USE OF INSULIN (HCC): ICD-10-CM

## 2019-04-18 DIAGNOSIS — G89.29 CHRONIC LEFT-SIDED THORACIC BACK PAIN: ICD-10-CM

## 2019-04-18 DIAGNOSIS — M54.6 CHRONIC LEFT-SIDED THORACIC BACK PAIN: ICD-10-CM

## 2019-04-18 RX ORDER — DICLOFENAC SODIUM 50 MG/1
TABLET, DELAYED RELEASE ORAL
Qty: 60 TAB | Refills: 0 | Status: SHIPPED | OUTPATIENT
Start: 2019-04-18 | End: 2019-05-28

## 2019-04-18 RX ORDER — LOVASTATIN 20 MG/1
20 TABLET ORAL
Qty: 90 TAB | Refills: 1 | Status: SHIPPED | OUTPATIENT
Start: 2019-04-18 | End: 2019-08-29 | Stop reason: SDUPTHER

## 2019-05-21 ENCOUNTER — HOSPITAL ENCOUNTER (OUTPATIENT)
Dept: LAB | Age: 69
Discharge: HOME | End: 2019-05-21
Payer: MEDICARE

## 2019-05-21 PROCEDURE — 84443 ASSAY THYROID STIM HORMONE: CPT

## 2019-05-21 PROCEDURE — 80053 COMPREHEN METABOLIC PANEL: CPT

## 2019-05-21 PROCEDURE — 80061 LIPID PANEL: CPT

## 2019-05-21 PROCEDURE — 84439 ASSAY OF FREE THYROXINE: CPT

## 2019-05-21 PROCEDURE — 85027 COMPLETE CBC AUTOMATED: CPT

## 2019-05-21 PROCEDURE — 36415 COLL VENOUS BLD VENIPUNCTURE: CPT

## 2019-05-24 PROBLEM — R74.8 ELEVATED ALKALINE PHOSPHATASE LEVEL: Status: ACTIVE | Noted: 2019-05-24

## 2019-05-24 PROBLEM — D64.9 ANEMIA: Status: ACTIVE | Noted: 2019-05-24

## 2019-05-28 ENCOUNTER — OFFICE VISIT (OUTPATIENT)
Dept: FAMILY MEDICINE CLINIC | Age: 69
End: 2019-05-28

## 2019-05-28 ENCOUNTER — HOSPITAL ENCOUNTER (OUTPATIENT)
Dept: LAB | Age: 69
Discharge: HOME | End: 2019-05-28
Payer: MEDICARE

## 2019-05-28 VITALS
BODY MASS INDEX: 37.65 KG/M2 | DIASTOLIC BLOOD PRESSURE: 74 MMHG | WEIGHT: 248.4 LBS | RESPIRATION RATE: 16 BRPM | SYSTOLIC BLOOD PRESSURE: 130 MMHG | HEIGHT: 68 IN | HEART RATE: 86 BPM | TEMPERATURE: 96.5 F | OXYGEN SATURATION: 96 %

## 2019-05-28 DIAGNOSIS — D69.6 THROMBOCYTOPENIA (HCC): ICD-10-CM

## 2019-05-28 DIAGNOSIS — R73.03 PREDIABETES: ICD-10-CM

## 2019-05-28 DIAGNOSIS — K74.60 CIRRHOSIS OF LIVER WITHOUT ASCITES, UNSPECIFIED HEPATIC CIRRHOSIS TYPE (HCC): ICD-10-CM

## 2019-05-28 DIAGNOSIS — R74.8 ELEVATED ALKALINE PHOSPHATASE LEVEL: ICD-10-CM

## 2019-05-28 DIAGNOSIS — D64.9 ANEMIA, UNSPECIFIED TYPE: ICD-10-CM

## 2019-05-28 DIAGNOSIS — R97.20 INCREASED PROSTATE SPECIFIC ANTIGEN (PSA) VELOCITY: ICD-10-CM

## 2019-05-28 DIAGNOSIS — I10 ESSENTIAL HYPERTENSION: Primary | ICD-10-CM

## 2019-05-28 DIAGNOSIS — M79.89 LEG SWELLING: ICD-10-CM

## 2019-05-28 DIAGNOSIS — G89.29 CHRONIC LEFT-SIDED THORACIC BACK PAIN: ICD-10-CM

## 2019-05-28 DIAGNOSIS — M54.6 CHRONIC LEFT-SIDED THORACIC BACK PAIN: ICD-10-CM

## 2019-05-28 DIAGNOSIS — E03.4 HYPOTHYROIDISM DUE TO ACQUIRED ATROPHY OF THYROID: ICD-10-CM

## 2019-05-28 DIAGNOSIS — E78.2 MIXED HYPERLIPIDEMIA: ICD-10-CM

## 2019-05-28 DIAGNOSIS — J43.9 PULMONARY EMPHYSEMA, UNSPECIFIED EMPHYSEMA TYPE (HCC): ICD-10-CM

## 2019-05-28 PROCEDURE — 81003 URINALYSIS AUTO W/O SCOPE: CPT

## 2019-05-28 PROCEDURE — 84439 ASSAY OF FREE THYROXINE: CPT

## 2019-05-28 PROCEDURE — 85027 COMPLETE CBC AUTOMATED: CPT

## 2019-05-28 PROCEDURE — 84443 ASSAY THYROID STIM HORMONE: CPT

## 2019-05-28 PROCEDURE — 83880 ASSAY OF NATRIURETIC PEPTIDE: CPT

## 2019-05-28 PROCEDURE — 83550 IRON BINDING TEST: CPT

## 2019-05-28 PROCEDURE — 36415 COLL VENOUS BLD VENIPUNCTURE: CPT

## 2019-05-28 PROCEDURE — 82728 ASSAY OF FERRITIN: CPT

## 2019-05-28 PROCEDURE — 82607 VITAMIN B-12: CPT

## 2019-05-28 NOTE — PROGRESS NOTES
HISTORY OF PRESENT ILLNESS  Nancy Yousif is a 77 y.o. male. HPI  Here today for follow up after having fasting labs drawn last week. 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 PSHx is significant for rotator cuff surgeries, cataract surgery, hernia repair. FHx is significant for heart disease. Was referred after his last visit to dermatology, but he has not yet scheduled that appt. Was also referred for colonoscopy at his last visit, but reports that he does not have a ride so isn't sure he'll be able to get that done. FIT test was positive in January 2019 and he was strongly advised to see GI, but he has not had this done. Recent Hgb has dropped nearly 3 pts from 14.3 to 11.5 since January. He declines any fatigue, unusual bleeding. His PSA had also increased to 1.6 from 0.6 in 2017 and he was advised to see urology; he has not done that either. He reports today that he will not see any specialists because of the cost. He reports that he does not have any discretionary income due to the cost of his and his wife's medications. COPD with emphysema  Takes spiriva 18mcg once daily, advair 500-50mcg twice daily, and uses albuterol and atrovent twice daily for COPD. He also uses the albuterol PRN, but he has not needed it. Takes singulair 10mg daily as well. He is a former smoker. He quit in approximately 2014. Was previously smoking 1/2 - 1ppd. Does not see pulmonology. Denies history of intubation. HTN  Patient takes losartan 100mg daily and metoprolol 50mg BID regularly. Patient does not check blood pressure at home routinely. Denies chest pain, shortness of breath, headache, lightheadedness, peripheral edema. Blood pressure in office today is 130/74, which is at goal.   Takes spironolactone 25mg PRN edema. Saw Dr. Maryana Good in May 2018 and had a normal cardiac cath after having an abnormal stress echo.  Following up in 1 year. Hyperlipidemia  Takes lovastatin 20mg once daily. Denies RUQ pain or myalgias. Not exercising. Working on diet. Recent lipid panel (May 2019) is excellent. Thrombocytopenia 2/2 alcoholic cirrhosis  PLT 62 (May 2019) down from 99 (Dec 2018), up from 80 in July 2018. Denies any abnormal bleeding. Olivares snot see heme-onc routinely. Alcoholic cirrhosis  He has a history of alcoholic cirrhosis. He quit drinking in [address] [place] [address] after a history of heavy drinking prior to that. He was hospitalized in 2010 and was told \"it's not that bad. \" He does not see a liver/GI specialist.   Last abd ultrasound was in August 2013, which showed hepatic cirrhosis with no focal abnormality to suggest neoplasm, per chart review. A repeat liver ultrasound was ordered in January 2019, but he has not scheduled this and does not intend to. Recent LFTs were normal with exception of elevated alk phos (148, up from 100). Hypothyroidism  Takes levothyroxine 88 mcg once daily. His hypothyroidism was diagnosed in 2010. Most recent TSH was 4.810 (May 2019), up from 1.970 in Dec 2018. Denies fatigue, weight changes, heat/cold intolerance, bowel/skin changes. BPH  Has a history of BPH, but patient states \"they haven't told me I have it. \" He takes terazosin 1mg nightly. Denies family history of prostate CA. Gets up 0 - 1 times nightly to urinate. Recent PSA (Dec 2018) was 1.7, which is increased from 0.6 one year prior. I referred him to urology, but he does not plan to go. Acute on chronic thoracic back pain  Has acute on chronic thoracic back pain. Reports \"I know I have a pinched disc. \" Wondering if there is anything he can take for it. No falls or injury. No numbness/tingling or change in bowel/bladder habits. Was started on diclofenac at his last visit, but reports that it is not helping. Reports that he has been to back specialists before, \"burst a disc in between L3 and L4 in 1993\".  Was told that surgery would be his only option and that it had only a 25% chance of working and a higher chance of paralysis. Has not seen an orthopedic specialist since the mid-. Had a normal thoracic X-ray in 2017. Does not want to do PT or see orthopedics due to cost.     Current Specialists:  1. Dr. Nannette Jarrell, cardiology - last visit Aug 2018, return in 1 yr    Preventative Care  TDaP: Dec 2018  Colonoscopy: 1969, per patient report; \"I ain't never had no problems\" but was referred at his last visit; has not yet scheduled due to lack of transportation    Healthy Habits  Patient is exercising 0x per week, \"I don't have the breath to do that. \"   Patient endorses mostly healthy diet; avoids fatty/greasy foods, sugar-sweetened beverages. Denies tobacco use. He quit in . Denies alcohol use; former alcoholic and quit in . Denies illicit drug use. Sexually active with 1 female partner in last 12 months. Uses viagra PRN for ED, about 4x weekly.      Lab Review (May 2019):  Normals: lipid panel,   Abnormals: TSH (4.810, up from 1.970 in 2019), CMP (Cr 1.43, up from 1.20; alk phos 148, up from 100), CBC (Hgb 11.5, down from 14.3, PLT 62, down from 99    Past Medical History:   Diagnosis Date    Blurred vision     Chronic obstructive pulmonary disease (HCC)     Cirrhosis, alcoholic (HCC)     COPD bronchitis     Ear discomfort     History of ETOH abuse     HTN (hypertension)     SOB (shortness of breath)     Thrombocytopenia (HCC)     Thyroid disease     Trouble in sleeping      Past Surgical History:   Procedure Laterality Date    HX CATARACT REMOVAL Right     HX CATARACT REMOVAL Left     HX CIRCUMCISION      at 24 y/o    HX HERNIA REPAIR  as a youth    right side    HX SHOULDER ARTHROSCOPY      left x 3 for torn rotator cuff muscle     Family History   Problem Relation Age of Onset    Hypertension Father     Heart Disease Father         pacemaker,  of MI @ 80    Hypertension Mother     Cancer Mother         breast with spread to bones    Diabetes Neg Hx      Social History     Socioeconomic History    Marital status:      Spouse name: Not on file    Number of children: Not on file    Years of education: Not on file    Highest education level: Not on file   Tobacco Use    Smoking status: Former Smoker     Types: Cigarettes     Last attempt to quit: 2014     Years since quittin.5    Smokeless tobacco: Former User    Tobacco comment: uses vap   Substance and Sexual Activity    Alcohol use: No     Alcohol/week: 0.0 oz     Comment: Quit Oct 21, 1985    Drug use: No    Sexual activity: Yes     Partners: Female     Birth control/protection: None     Patient Active Problem List   Diagnosis Code    Obstructive sleep apnea (adult) (pediatric) G47.33    COPD (chronic obstructive pulmonary disease) ([facility] J44.9    Cirrhosis ([facility] K74.60    History of ETOH abuse Z87.898    Thrombocytopenia ([facility] D69.6    Lower urinary tract symptoms (LUTS) R39.9    Hypothyroidism due to acquired atrophy of thyroid E03.4    Essential hypertension I10    Chronic left-sided thoracic back pain M54.6, G89.29    Severe obesity (BMI 35.0-39. 9) with comorbidity ([facility] E66.01    Angina, class III (HCC) I20.9    Abnormal stress test R94.39    Leg swelling M79.89    Mixed hyperlipidemia E78.2    Increased prostate specific antigen (PSA) velocity R97.20    Anemia D64.9    Elevated alkaline phosphatase level R74.8     Outpatient Encounter Medications as of 2019   Medication Sig Dispense Refill    lovastatin (MEVACOR) 20 mg tablet Take 1 Tab by mouth nightly. 90 Tab 1    losartan (COZAAR) 100 mg tablet TAKE ONE TABLET BY MOUTH DAILY 90 Tab 1    montelukast (SINGULAIR) 10 mg tablet Take 1 Tab by mouth daily. 90 Tab 3    albuterol (PROVENTIL VENTOLIN) 2.5 mg /3 mL (0.083 %) nebulizer solution 3 mL by Nebulization route every four (4) hours as needed for Wheezing.  180 Each 5    terazosin (HYTRIN) 1 mg capsule TAKE ONE CAPSULE BY MOUTH ONCE NIGHTLY 30 Cap 5    ipratropium (ATROVENT) 0.02 % soln 2.5 mL by Nebulization route four (4) times daily as needed. 375 mL 1    magnesium oxide 400 mg cap 1qd for toe cramps 90 Cap 2    tiotropium (SPIRIVA) 18 mcg inhalation capsule Take 1 Cap by inhalation daily. 30 Cap 11    metoprolol tartrate (LOPRESSOR) 50 mg tablet TAKE ONE TABLET BY MOUTH TWICE A  Tab 1    levothyroxine (SYNTHROID) 88 mcg tablet TAKE ONE TABLET BY MOUTH EVERY MORNING BEFORE BREAKFAST 90 Tab 3    aspirin delayed-release 81 mg tablet Take  by mouth daily.  fluticasone-salmeterol (ADVAIR DISKUS) 500-50 mcg/dose diskus inhaler Take 1 Puff by inhalation every twelve (12) hours.  [DISCONTINUED] diclofenac EC (VOLTAREN) 50 mg EC tablet TAKE ONE TABLET BY MOUTH TWICE A DAY AS NEEDED WITH FOOD OR MILK 60 Tab 0    VIAGRA 100 mg tablet Take 1 Tab by mouth as needed (prior to sexual activity). 30 Tab 0    spironolactone (ALDACTONE) 25 mg tablet Take 1 Tab by mouth daily as needed. 30 Tab 1    [DISCONTINUED] ergocalciferol (ERGOCALCIFEROL) 50,000 unit capsule Take 1 Cap by mouth every seven (7) days. For 8 weeks and then once monthly. 12 Cap 3     No facility-administered encounter medications on file as of 5/28/2019. Visit Vitals  /74 (BP 1 Location: Right arm, BP Patient Position: Sitting)   Pulse 86   Temp 96.5 °F (35.8 °C) (Oral)   Resp 16   Ht 5' 8\" (1.727 m)   Wt 248 lb 6.4 oz (112.7 kg)   SpO2 96%   BMI 37.77 kg/m²         Review of Systems   Constitutional: Negative for chills, fever and malaise/fatigue. Eyes: Negative for blurred vision and double vision. Respiratory: Negative for cough and shortness of breath. Cardiovascular: Positive for leg swelling. Negative for chest pain, palpitations and orthopnea. Gastrointestinal: Negative for abdominal pain, blood in stool, constipation and diarrhea.    Genitourinary: Negative for dysuria, frequency and hematuria. Musculoskeletal: Positive for back pain. Neurological: Negative for dizziness, tingling and headaches. Psychiatric/Behavioral: Negative for depression, substance abuse and suicidal ideas. Physical Exam   Constitutional: He is oriented to person, place, and time. He appears well-developed and well-nourished. No distress. HENT:   Head: Normocephalic and atraumatic. Cardiovascular: Normal rate, regular rhythm and normal heart sounds. Mild non-pitting edema LLE   Pulmonary/Chest: Effort normal and breath sounds normal. No respiratory distress. He has no wheezes. Musculoskeletal:        Thoracic back: He exhibits tenderness. He exhibits normal range of motion and no bony tenderness. Mild tenderness to palpation over R scapula, no pain over thoracic spine   Neurological: He is alert and oriented to person, place, and time. Skin: Skin is warm and dry. He is not diaphoretic. Psychiatric: He has a normal mood and affect. His behavior is normal. Judgment normal.   Nursing note and vitals reviewed. ASSESSMENT and PLAN    ICD-10-CM ICD-9-CM    1. Essential hypertension I10 401.9 URINALYSIS W/ RFLX MICROSCOPIC   2. Hypothyroidism due to acquired atrophy of thyroid E03.4 244.8 TSH RFX ON ABNORMAL TO FREE T4     246.8    3. Pulmonary emphysema, unspecified emphysema type ([facility] J43.9 492.8    4. Thrombocytopenia (HCC) D69.6 287.5    5. Cirrhosis of liver without ascites, unspecified hepatic cirrhosis type (HCC) K74.60 571.5    6. Mixed hyperlipidemia E78.2 272.2    7. Leg swelling M79.89 729.81 BNP   8. Increased prostate specific antigen (PSA) velocity R97.20 790.93    9. Elevated alkaline phosphatase level R74.8 790.5    10. Anemia, unspecified type D64.9 285.9 CBC W/O DIFF      FERRITIN      IRON PROFILE      VITAMIN B12 & FOLATE   11. Chronic left-sided thoracic back pain M54.6 724.1 XR SPINE THORAC 3 V    G89.29 338.29      BP remains at goal; continue current medications.   Lipid panel at goal; continue current medication regimen. COPD well-controlled; continue. I emphasized the importance and seriousness of his new-finding of anemia, especialy in combination with a + FIT test in January 2019. He states, \"Well, we all have to die of something. \" I explained that the \"red flag\" finding I'm trying to rule out is colon cancer and a visit to GI is strongly recommended; he declines. Education was provided about this, but patient continues to decline. Stop diclofenac as it is not helping with his back pain anyway and may be contributing to his anemia. Recheck CBC, ferritin, iron profile, B12 and folate today. Thoracic x-ray today; declines referral to PT or orthopedics. Again emphasized the importance of seeing derm for the lesion/neoplasm on his right hand and urology for the elevated PSA; again, he declines. Recheck TSH today prior to adjusting levothyroxine dose, as he has been well-controlled on current dose of levothyroxine previously. Return in 3 months for fasting labs and lab/med f/u 1 week later, sooner PRN or TBD by lab results. Follow-up and Dispositions    · Return in about 3 months (around 8/28/2019) for fasting labs and lab/med f/u 1 week later, sooner PRN.

## 2019-05-28 NOTE — PROGRESS NOTES
Chief Complaint   Patient presents with    Follow-up     1. Have you been to the ER, urgent care clinic since your last visit? Hospitalized since your last visit? No    2. Have you seen or consulted any other health care providers outside of the [address] [place] [address] since your last visit? Include any pap smears or colon screening. No    Patient here for follow up. Has not had any medications today.

## 2019-05-28 NOTE — PATIENT INSTRUCTIONS
Please consider seeing a GI specialist to discuss your anemia, a dermatologist for the growth on your right hand, and a urologist for your elevated PSA. STOP taking the diclofenac for your back pain.

## 2019-05-29 ENCOUNTER — TELEPHONE (OUTPATIENT)
Dept: FAMILY MEDICINE CLINIC | Age: 69
End: 2019-05-29

## 2019-05-29 LAB — BNP SERPL-MCNC: 58.1 PG/ML (ref 0–100)

## 2019-05-29 NOTE — TELEPHONE ENCOUNTER
----- Message from Brody Bryan NP sent at 5/28/2019  1:50 PM EDT -----  Please let patient know that his X-ray from this morning shows minimal arthritis. I would recommend he do some physical therapy, as we discussed this morning. Thanks.   Brody Bryan NP

## 2019-05-30 LAB
APPEARANCE UR: CLEAR
BILIRUB UR QL STRIP: NEGATIVE
COLOR UR: YELLOW
ERYTHROCYTE [DISTWIDTH] IN BLOOD BY AUTOMATED COUNT: 15.2 % (ref 12.3–15.4)
FERRITIN SERPL-MCNC: 66 NG/ML (ref 30–400)
FOLATE SERPL-MCNC: 4.3 NG/ML
GLUCOSE UR QL: NEGATIVE
HCT VFR BLD AUTO: 35.5 % (ref 37.5–51)
HGB BLD-MCNC: 11.9 G/DL (ref 13–17.7)
HGB UR QL STRIP: NEGATIVE
IRON SATN MFR SERPL: 27 % (ref 15–55)
IRON SERPL-MCNC: 71 UG/DL (ref 38–169)
KETONES UR QL STRIP: NEGATIVE
LEUKOCYTE ESTERASE UR QL STRIP: NEGATIVE
MCH RBC QN AUTO: 35.3 PG (ref 26.6–33)
MCHC RBC AUTO-ENTMCNC: 33.5 G/DL (ref 31.5–35.7)
MCV RBC AUTO: 105 FL (ref 79–97)
MICRO URNS: NORMAL
MORPHOLOGY BLD-IMP: ABNORMAL
NITRITE UR QL STRIP: NEGATIVE
PH UR STRIP: 6.5 [PH] (ref 5–7.5)
PLATELET # BLD AUTO: 80 X10E3/UL (ref 150–450)
PROT UR QL STRIP: NEGATIVE
RBC # BLD AUTO: 3.37 X10E6/UL (ref 4.14–5.8)
SP GR UR: 1.01 (ref 1–1.03)
T4 FREE SERPL-MCNC: 1.19 NG/DL (ref 0.82–1.77)
TIBC SERPL-MCNC: 259 UG/DL (ref 250–450)
TSH SERPL DL<=0.005 MIU/L-ACNC: 4.65 UIU/ML (ref 0.45–4.5)
UIBC SERPL-MCNC: 188 UG/DL (ref 111–343)
UROBILINOGEN UR STRIP-MCNC: 0.2 MG/DL (ref 0.2–1)
VIT B12 SERPL-MCNC: 810 PG/ML (ref 232–1245)
WBC # BLD AUTO: 4.3 X10E3/UL (ref 3.4–10.8)

## 2019-05-30 RX ORDER — LEVOTHYROXINE SODIUM 100 UG/1
100 TABLET ORAL
Qty: 30 TAB | Refills: 3 | Status: SHIPPED | OUTPATIENT
Start: 2019-05-30 | End: 2019-08-29 | Stop reason: SDUPTHER

## 2019-05-30 NOTE — PROGRESS NOTES
Please let patient know that his labwork has returned. There are a couple of findings to note:  1. His thyroid level is still off, so it is necessary to adjust the dose of his levothyroxine. We will increase to 100mcg daily; I have sent a new Rx to [facility] and he should start this right away. 2. His hemoglobin is slightly improved, but still low without any obvious cause. As he had a positive FIT test earlier this year, I STRONGLY encourage him to have the colonoscopy we discussed earlier this week. Thanks!   Brody Bryan NP

## 2019-05-30 NOTE — PROGRESS NOTES
Advised patient of lab results per [facility], increased dose of levothyroxine, HBG still low, strongly urge patient to schedule colonoscopy. Patient states he does not have enough money or a  to take him. He only has medicare no supplement.

## 2019-06-01 ENCOUNTER — APPOINTMENT (OUTPATIENT)
Dept: GENERAL RADIOLOGY | Age: 69
End: 2019-06-01
Attending: PHYSICIAN ASSISTANT
Payer: MEDICARE

## 2019-06-01 ENCOUNTER — HOSPITAL ENCOUNTER (EMERGENCY)
Age: 69
Discharge: HOME | End: 2019-06-01
Attending: EMERGENCY MEDICINE
Payer: MEDICARE

## 2019-06-01 VITALS
SYSTOLIC BLOOD PRESSURE: 115 MMHG | OXYGEN SATURATION: 100 % | DIASTOLIC BLOOD PRESSURE: 62 MMHG | WEIGHT: 248 LBS | TEMPERATURE: 98 F | RESPIRATION RATE: 18 BRPM | HEART RATE: 78 BPM | HEIGHT: 68 IN | BODY MASS INDEX: 37.59 KG/M2

## 2019-06-01 DIAGNOSIS — Q65.89 HIP DYSPLASIA: ICD-10-CM

## 2019-06-01 DIAGNOSIS — M25.552 LEFT HIP PAIN: Primary | ICD-10-CM

## 2019-06-01 PROCEDURE — 99283 EMERGENCY DEPT VISIT LOW MDM: CPT

## 2019-06-01 PROCEDURE — 73502 X-RAY EXAM HIP UNI 2-3 VIEWS: CPT

## 2019-06-01 RX ORDER — PREDNISONE 10 MG/1
TABLET ORAL
Qty: 21 TAB | Refills: 0 | Status: SHIPPED | OUTPATIENT
Start: 2019-06-01 | End: 2019-08-29 | Stop reason: ALTCHOICE

## 2019-06-01 RX ORDER — HYDROCODONE BITARTRATE AND ACETAMINOPHEN 7.5; 325 MG/1; MG/1
1 TABLET ORAL
Qty: 10 TAB | Refills: 0 | Status: SHIPPED | OUTPATIENT
Start: 2019-06-01 | End: 2019-06-04

## 2019-06-01 NOTE — ED PROVIDER NOTES
EMERGENCY DEPARTMENT HISTORY AND PHYSICAL EXAM      Date: 6/1/2019  Patient Name: Jeana Collier    History of Presenting Illness     Chief Complaint   Patient presents with    Leg Pain     Patient complain of left hip pain that radiates into knee Denies any injury        History Provided By: Patient    HPI: Jeana Collier, 77 y.o. male presents ambulatory to the ED with cc of 1 week or so of 10 out of 10 essentially constant, aching left hip pain that radiates into the quadriceps and is worse with weightbearing and not associated with it he tells me he was seen this past week at his primary care where x-rays of his lower back were \"normal\". He has been taking diclofenac with no lasting or any real improvement of the symptoms. Denies saddle anesthesia, abdominal pain, bowel or bladder symptoms, weakness, numbness or fever. There are no other complaints, changes, or physical findings at this time. PCP: Mumtaz Lopez NP    Current Outpatient Medications   Medication Sig Dispense Refill    predniSONE (DELTASONE) 10 mg tablet Take 6 tablets once by mouth with food on day 1; Take 5 tablets once by mouth with food on day 2; Take 4 tablets once by mouth with food on day 3; Take 3 tablets once by mouth with food on day 4; Take 2 tablets once by mouth with food on day 5; Take 1 tablet once by mouth with food on day 6 (#21) 21 Tab 0    HYDROcodone-acetaminophen (LORTAB 7.5-325) 7.5-325 mg per tablet Take 1 Tab by mouth every eight (8) hours as needed for Pain for up to 3 days. Max Daily Amount: 3 Tabs. Indications: Pain 10 Tab 0    levothyroxine (SYNTHROID) 100 mcg tablet Take 1 Tab by mouth Daily (before breakfast). 30 Tab 3    lovastatin (MEVACOR) 20 mg tablet Take 1 Tab by mouth nightly. 90 Tab 1    losartan (COZAAR) 100 mg tablet TAKE ONE TABLET BY MOUTH DAILY 90 Tab 1    montelukast (SINGULAIR) 10 mg tablet Take 1 Tab by mouth daily.  90 Tab 3    terazosin (HYTRIN) 1 mg capsule TAKE ONE CAPSULE BY MOUTH ONCE NIGHTLY 30 Cap 5    spironolactone (ALDACTONE) 25 mg tablet Take 1 Tab by mouth daily as needed. 30 Tab 1    magnesium oxide 400 mg cap 1qd for toe cramps 90 Cap 2    tiotropium (SPIRIVA) 18 mcg inhalation capsule Take 1 Cap by inhalation daily. 30 Cap 11    metoprolol tartrate (LOPRESSOR) 50 mg tablet TAKE ONE TABLET BY MOUTH TWICE A  Tab 1    aspirin delayed-release 81 mg tablet Take  by mouth daily.  fluticasone-salmeterol (ADVAIR DISKUS) 500-50 mcg/dose diskus inhaler Take 1 Puff by inhalation every twelve (12) hours.  VIAGRA 100 mg tablet Take 1 Tab by mouth as needed (prior to sexual activity). 30 Tab 0    albuterol (PROVENTIL VENTOLIN) 2.5 mg /3 mL (0.083 %) nebulizer solution 3 mL by Nebulization route every four (4) hours as needed for Wheezing. 180 Each 5    ipratropium (ATROVENT) 0.02 % soln 2.5 mL by Nebulization route four (4) times daily as needed.  375 mL 1     Past History     Past Medical History:  Past Medical History:   Diagnosis Date    Blurred vision     Chronic obstructive pulmonary disease (HCC)     Cirrhosis, alcoholic (HCC)     COPD bronchitis     Ear discomfort     History of ETOH abuse     HTN (hypertension)     SOB (shortness of breath)     Thrombocytopenia (HCC)     Thyroid disease     Trouble in sleeping        Past Surgical History:  Past Surgical History:   Procedure Laterality Date    HX CATARACT REMOVAL Right     HX CATARACT REMOVAL Left     HX CIRCUMCISION      at 26 y/o    HX HERNIA REPAIR  as a youth    right side    HX SHOULDER ARTHROSCOPY      left x 3 for torn rotator cuff muscle       Family History:  Family History   Problem Relation Age of Onset    Hypertension Father     Heart Disease Father         pacemaker,  of MI @ 80    Hypertension Mother     Cancer Mother         breast with spread to bones    Diabetes Neg Hx        Social History:  Social History     Tobacco Use    Smoking status: Former Smoker Types: Cigarettes     Last attempt to quit: 2014     Years since quittin.5    Smokeless tobacco: Former User    Tobacco comment: uses vap   Substance Use Topics    Alcohol use: No     Alcohol/week: 0.0 oz     Comment: Quit Oct 21, 1985    Drug use: No       Allergies: Allergies   Allergen Reactions    Bactrim [Sulfamethoprim Ds] Diarrhea and Nausea and Vomiting     After on 1st dose.  Morphine Unknown (comments)     itching     Review of Systems   Review of Systems   Constitutional: Negative for fatigue and fever. HENT: Negative for congestion, ear pain and rhinorrhea. Eyes: Negative for pain and redness. Respiratory: Negative for cough and wheezing. Cardiovascular: Negative for chest pain and palpitations. Gastrointestinal: Negative for abdominal pain, nausea and vomiting. Genitourinary: Negative for dysuria, frequency and urgency. Musculoskeletal: Negative for back pain, neck pain and neck stiffness. Left hip pain   Skin: Negative for rash and wound. Neurological: Negative for weakness, light-headedness, numbness and headaches. Physical Exam   Physical Exam   Constitutional: He is oriented to person, place, and time. He appears well-developed and well-nourished. Non-toxic appearance. No distress. HENT:   Head: Normocephalic and atraumatic. Head is without right periorbital erythema and without left periorbital erythema. Right Ear: External ear normal.   Left Ear: External ear normal.   Nose: Nose normal.   Mouth/Throat: Uvula is midline. No trismus in the jaw. Eyes: Pupils are equal, round, and reactive to light. Conjunctivae and EOM are normal. No scleral icterus. Neck: Normal range of motion and full passive range of motion without pain. Cardiovascular: Normal rate, regular rhythm and normal heart sounds. Pulmonary/Chest: Effort normal and breath sounds normal. No accessory muscle usage. No tachypnea. No respiratory distress.  He has no decreased breath sounds. He has no wheezes. Abdominal: Soft. There is no tenderness. There is no rigidity and no guarding. Musculoskeletal: Normal range of motion. Left hip: He exhibits tenderness. Legs:  LEFT HIP:  No bruising, redness or swelling  Able to flex hip and knee greater than 90 degrees  Lateral tenderness greatest over the greater trochanter   Neurological: He is alert and oriented to person, place, and time. He is not disoriented. No cranial nerve deficit or sensory deficit. GCS eye subscore is 4. GCS verbal subscore is 5. GCS motor subscore is 6. Skin: Skin is intact. No rash noted. Psychiatric: He has a normal mood and affect. His speech is normal.   Nursing note and vitals reviewed. Diagnostic Study Results     Labs -   No results found for this or any previous visit (from the past 12 hour(s)). Radiologic Studies -   XR HIP LT W OR WO PELV 2-3 VWS   Final Result   IMPRESSION: No acute abnormality. Mild hip dysplasia. CT Results  (Last 48 hours)    None        CXR Results  (Last 48 hours)    None        Medical Decision Making   I am the first provider for this patient. I reviewed the vital signs, available nursing notes, past medical history, past surgical history, family history and social history. Vital Signs-Reviewed the patient's vital signs. Patient Vitals for the past 12 hrs:   Temp Pulse Resp BP SpO2   06/01/19 1536 98 °F (36.7 °C) 78 18 115/62 100 %   06/01/19 1218 97.8 °F (36.6 °C) 84 16 112/56 98 %     Pulse Oximetry Analysis - 100% on RA    Records Reviewed: Nursing Notes, Old Medical Records, Previous Radiology Studies, Previous Laboratory Studies and     Provider Notes (Medical Decision Making):   DDx: Trochanteric bursitis, hip dysplasia, DJD, lumbar radiculopathy    ED Course:   Initial assessment performed. The patients presenting problems have been discussed, and they are in agreement with the care plan formulated and outlined with them.   I have encouraged them to ask questions as they arise throughout their visit. Disposition:  Discharge    PLAN:  1. Discharge Medication List as of 6/1/2019  3:33 PM      START taking these medications    Details   predniSONE (DELTASONE) 10 mg tablet Take 6 tablets once by mouth with food on day 1; Take 5 tablets once by mouth with food on day 2; Take 4 tablets once by mouth with food on day 3; Take 3 tablets once by mouth with food on day 4; Take 2 tablets once by mouth with food on day 5; Take 1 ta blet once by mouth with food on day 6 (#21), Print, Disp-21 Tab, R-0      HYDROcodone-acetaminophen (LORTAB 7.5-325) 7.5-325 mg per tablet Take 1 Tab by mouth every eight (8) hours as needed for Pain for up to 3 days. Max Daily Amount: 3 Tabs. Indications: Pain, Print, Disp-10 Tab, R-0         CONTINUE these medications which have NOT CHANGED    Details   levothyroxine (SYNTHROID) 100 mcg tablet Take 1 Tab by mouth Daily (before breakfast). , Normal, Disp-30 Tab, R-3      lovastatin (MEVACOR) 20 mg tablet Take 1 Tab by mouth nightly., Normal, Disp-90 Tab, R-1      losartan (COZAAR) 100 mg tablet TAKE ONE TABLET BY MOUTH DAILY, Normal, Disp-90 Tab, R-1      montelukast (SINGULAIR) 10 mg tablet Take 1 Tab by mouth daily. , Normal, Disp-90 Tab, R-3      terazosin (HYTRIN) 1 mg capsule TAKE ONE CAPSULE BY MOUTH ONCE NIGHTLY, Normal, Disp-30 Cap, R-5      spironolactone (ALDACTONE) 25 mg tablet Take 1 Tab by mouth daily as needed., Normal, Disp-30 Tab, R-1      magnesium oxide 400 mg cap 1qd for toe cramps, NormalOr ok to give 1 otc bottle. If no 400mg, can take one 250mg daily. Disp-90 Cap, R-2      tiotropium (SPIRIVA) 18 mcg inhalation capsule Take 1 Cap by inhalation daily. , NormalAdd to refills. Disp-30 Cap, R-11      metoprolol tartrate (LOPRESSOR) 50 mg tablet TAKE ONE TABLET BY MOUTH TWICE A DAY, Normal, Disp-180 Tab, R-1      aspirin delayed-release 81 mg tablet Take  by mouth daily. , Historical Med fluticasone-salmeterol (ADVAIR DISKUS) 500-50 mcg/dose diskus inhaler Take 1 Puff by inhalation every twelve (12) hours. , Historical Med      VIAGRA 100 mg tablet Take 1 Tab by mouth as needed (prior to sexual activity). , No PrintPAP RxDisp-30 Tab, R-0, RANDALL      albuterol (PROVENTIL VENTOLIN) 2.5 mg /3 mL (0.083 %) nebulizer solution 3 mL by Nebulization route every four (4) hours as needed for Wheezing., Normal, Disp-180 Each, R-5      ipratropium (ATROVENT) 0.02 % soln 2.5 mL by Nebulization route four (4) times daily as needed., Normal, Disp-375 mL, R-1           2. Follow-up Information     Follow up With Specialties Details Why Contact Info    Lenard New MD Orthopedic Surgery Schedule an appointment as soon as possible for a visit ORTHO: as needed if symptoms persist [address]   [address]  [facility].  [phone]          Return to ED if worse     Diagnosis     Clinical Impression:   1. Left hip pain    2.  Hip dysplasia

## 2019-06-01 NOTE — ED NOTES
Patient presents to ED with c/o left hip and radiates down leg. Pain when walking and staying still. Denies injury/trauma. Emergency Department Nursing Plan of Care       The Nursing Plan of Care is developed from the Nursing assessment and Emergency Department Attending provider initial evaluation. The plan of care may be reviewed in the ED Provider note.     The Plan of Care was developed with the following considerations:   Patient / Family readiness to learn indicated by:verbalized understanding and successful return demonstration  Persons(s) to be included in education: patient  Barriers to Learning/Limitations:No    Signed     [address] Kenji Ellis RN    6/1/2019   1:13 PM

## 2019-06-14 ENCOUNTER — TELEPHONE (OUTPATIENT)
Dept: FAMILY MEDICINE CLINIC | Age: 69
End: 2019-06-14

## 2019-06-14 DIAGNOSIS — N52.9 ERECTILE DYSFUNCTION, UNSPECIFIED ERECTILE DYSFUNCTION TYPE: ICD-10-CM

## 2019-06-14 RX ORDER — SILDENAFIL CITRATE 100 MG/1
100 TABLET, FILM COATED ORAL AS NEEDED
Qty: 30 TAB | Refills: 0 | Status: SHIPPED | COMMUNITY
Start: 2019-06-14

## 2019-06-14 NOTE — TELEPHONE ENCOUNTER
Received PA medications for Viagra 100mg 30 tablets. Attempted to reach patient by telephone. A message was left for return call.

## 2019-06-14 NOTE — TELEPHONE ENCOUNTER
Verified patient with two types of identifiers. Notified patient that medication is ready for . He states that he will  today.

## 2019-06-29 DIAGNOSIS — E87.6 HYPOKALEMIA: ICD-10-CM

## 2019-06-29 DIAGNOSIS — M79.89 LEG SWELLING: ICD-10-CM

## 2019-07-01 RX ORDER — SPIRONOLACTONE 25 MG/1
TABLET ORAL
Qty: 30 TAB | Refills: 0 | Status: SHIPPED | OUTPATIENT
Start: 2019-07-01 | End: 2019-07-27 | Stop reason: SDUPTHER

## 2019-07-27 DIAGNOSIS — E87.6 HYPOKALEMIA: ICD-10-CM

## 2019-07-27 DIAGNOSIS — M79.89 LEG SWELLING: ICD-10-CM

## 2019-07-28 RX ORDER — SPIRONOLACTONE 25 MG/1
TABLET ORAL
Qty: 30 TAB | Refills: 0 | Status: SHIPPED | OUTPATIENT
Start: 2019-07-28 | End: 2019-08-24 | Stop reason: SDUPTHER

## 2019-08-19 ENCOUNTER — TELEPHONE (OUTPATIENT)
Dept: FAMILY MEDICINE CLINIC | Age: 69
End: 2019-08-19

## 2019-08-20 ENCOUNTER — OFFICE VISIT (OUTPATIENT)
Dept: CARDIOLOGY CLINIC | Age: 69
End: 2019-08-20

## 2019-08-20 VITALS
HEIGHT: 68 IN | WEIGHT: 235.1 LBS | DIASTOLIC BLOOD PRESSURE: 64 MMHG | SYSTOLIC BLOOD PRESSURE: 120 MMHG | BODY MASS INDEX: 35.63 KG/M2 | OXYGEN SATURATION: 95 % | HEART RATE: 84 BPM | RESPIRATION RATE: 16 BRPM

## 2019-08-20 DIAGNOSIS — G47.33 OBSTRUCTIVE SLEEP APNEA (ADULT) (PEDIATRIC): ICD-10-CM

## 2019-08-20 DIAGNOSIS — I10 ESSENTIAL HYPERTENSION: Primary | ICD-10-CM

## 2019-08-20 DIAGNOSIS — E78.2 MIXED HYPERLIPIDEMIA: ICD-10-CM

## 2019-08-20 NOTE — PROGRESS NOTES
1. Have you been to the ER, urgent care clinic since your last visit? Hospitalized since your last visit? Yes 6/2019 leg pain    2. Have you seen or consulted any other health care providers outside of the [address] [place] [address] since your last visit? Include any pap smears or colon screening. Eye exam.    Chief Complaint   Patient presents with    Follow-up    Hypertension     Patient C/O dizziness unsteady gait and floaters in his eyes at times.

## 2019-08-20 NOTE — PROGRESS NOTES
8/20/2019 9:33 AM      Subjective:     Ana Cristina Linder is here for f/u visit. Doing very well. He denies chest pain, chest pressure/discomfort, dyspnea, palpitations, irregular heart beats, near-syncope, syncope, fatigue, orthopnea, paroxysmal nocturnal dyspnea, exertional chest pressure/discomfort, claudication, lower extremity edema, tachypnea. Visit Vitals  /64 (BP 1 Location: Left arm, BP Patient Position: Sitting)   Pulse 84   Resp 16   Ht 5' 8\" (1.727 m)   Wt 235 lb 1.6 oz (106.6 kg)   SpO2 95%   BMI 35.75 kg/m²     Current Outpatient Medications   Medication Sig    terazosin (HYTRIN) 1 mg capsule TAKE ONE CAPSULE BY MOUTH ONCE NIGHTLY    spironolactone (ALDACTONE) 25 mg tablet TAKE ONE TABLET BY MOUTH DAILY AS NEEDED    VIAGRA 100 mg tablet Take 1 Tab by mouth as needed (prior to sexual activity).  levothyroxine (SYNTHROID) 100 mcg tablet Take 1 Tab by mouth Daily (before breakfast).  lovastatin (MEVACOR) 20 mg tablet Take 1 Tab by mouth nightly.  losartan (COZAAR) 100 mg tablet TAKE ONE TABLET BY MOUTH DAILY    montelukast (SINGULAIR) 10 mg tablet Take 1 Tab by mouth daily.  albuterol (PROVENTIL VENTOLIN) 2.5 mg /3 mL (0.083 %) nebulizer solution 3 mL by Nebulization route every four (4) hours as needed for Wheezing.  ipratropium (ATROVENT) 0.02 % soln 2.5 mL by Nebulization route four (4) times daily as needed.  magnesium oxide 400 mg cap 1qd for toe cramps    tiotropium (SPIRIVA) 18 mcg inhalation capsule Take 1 Cap by inhalation daily.  metoprolol tartrate (LOPRESSOR) 50 mg tablet TAKE ONE TABLET BY MOUTH TWICE A DAY    aspirin delayed-release 81 mg tablet Take  by mouth daily.  fluticasone-salmeterol (ADVAIR DISKUS) 500-50 mcg/dose diskus inhaler Take 1 Puff by inhalation every twelve (12) hours.  predniSONE (DELTASONE) 10 mg tablet Take 6 tablets once by mouth with food on day 1; Take 5 tablets once by mouth with food on day 2;  Take 4 tablets once by mouth with food on day 3; Take 3 tablets once by mouth with food on day 4; Take 2 tablets once by mouth with food on day 5; Take 1 tablet once by mouth with food on day 6 (#21) (Patient not taking: Reported on 2019)     No current facility-administered medications for this visit. Objective:      Visit Vitals  /64 (BP 1 Location: Left arm, BP Patient Position: Sitting)   Pulse 84   Resp 16   Ht 5' 8\" (1.727 m)   Wt 235 lb 1.6 oz (106.6 kg)   SpO2 95%   BMI 35.75 kg/m²       Data Review:    EKG: Normal sinus rhythm, 1st degree AV block, LAD    Reviewed and/or ordered active problem list, medication list tests    Past Medical History:   Diagnosis Date    Blurred vision     Chronic obstructive pulmonary disease (HCC)     Cirrhosis, alcoholic (HCC)     COPD bronchitis     Ear discomfort     History of ETOH abuse     HTN (hypertension)     SOB (shortness of breath)     Thrombocytopenia (HCC)     Thyroid disease     Trouble in sleeping       Past Surgical History:   Procedure Laterality Date    HX CATARACT REMOVAL Right     HX CATARACT REMOVAL Left     HX CIRCUMCISION      at 26 y/o    HX HERNIA REPAIR  as a youth    right side    HX SHOULDER ARTHROSCOPY      left x 3 for torn rotator cuff muscle     Allergies   Allergen Reactions    Bactrim [Sulfamethoprim Ds] Diarrhea and Nausea and Vomiting     After on 1st dose.     Morphine Unknown (comments)     itching      Family History   Problem Relation Age of Onset    Hypertension Father     Heart Disease Father         pacemaker,  of MI @ 80    Hypertension Mother     Cancer Mother         breast with spread to bones    Diabetes Neg Hx       Social History     Socioeconomic History    Marital status:      Spouse name: Not on file    Number of children: Not on file    Years of education: Not on file    Highest education level: Not on file   Occupational History    Not on file   Social Needs    Financial resource strain: Not on file    Food insecurity:     Worry: Not on file     Inability: Not on file    Transportation needs:     Medical: Not on file     Non-medical: Not on file   Tobacco Use    Smoking status: Former Smoker     Types: Cigarettes     Last attempt to quit: 2014     Years since quittin.8    Smokeless tobacco: Former User    Tobacco comment: uses vap   Substance and Sexual Activity    Alcohol use: No     Alcohol/week: 0.0 standard drinks     Comment: Quit Oct 21, 1985    Drug use: No    Sexual activity: Yes     Partners: Female     Birth control/protection: None   Lifestyle    Physical activity:     Days per week: Not on file     Minutes per session: Not on file    Stress: Not on file   Relationships    Social connections:     Talks on phone: Not on file     Gets together: Not on file     Attends [employer] service: Not on file     Active member of club or organization: Not on file     Attends meetings of clubs or organizations: Not on file     Relationship status: Not on file    Intimate partner violence:     Fear of current or ex partner: Not on file     Emotionally abused: Not on file     Physically abused: Not on file     Forced sexual activity: Not on file   Other Topics Concern    Not on file   Social History Narrative    Not on file         Review of Systems     General: Not Present- Anorexia, Chills, Dietary Changes, Fatigue, Fever, Medication Changes, Night Sweats, Weight Gain > 10lbs. and Weight Loss > 10lbs. .  Skin: Not Present- Bruising and Excessive Sweating. HEENT: Not Present- Headache, Visual Loss and Vertigo. Respiratory: Not Present- Cough, Decreased Exercise Tolerance, Difficulty Breathing, Snoring and Wheezing.   Cardiovascular: Not Present- Abnormal Blood Pressure, Chest Pain, Claudications, Difficulty Breathing On Exertion, Edema, Fainting / Blacking Out, Irregular Heart Beat, Night Cramps, Orthopnea, Palpitations, Paroxysmal Nocturnal Dyspnea, Rapid Heart Rate, Shortness of Breath and Swelling of Extremities. Gastrointestinal: Not Present- Black, Tarry Stool, Bloody Stool, Diarrhea, Hematemesis, Rectal Bleeding and Vomiting. Musculoskeletal: Not Present- Muscle Pain and Muscle Weakness. Neurological: Not Present- Dizziness. Psychiatric: Not Present- Depression. Endocrine: Not Present- Cold Intolerance, Heat Intolerance and Thyroid Problems. Hematology: Not Present- Abnormal Bleeding, Anemia, Blood Clots and Easy Bruising.       Physical Exam   The physical exam findings are as follows:       General   Mental Status - Alert. General Appearance - Not in acute distress. Chest and Lung Exam   Inspection: Accessory muscles - No use of accessory muscles in breathing. Auscultation:   Breath sounds: - Normal.      Cardiovascular   Inspection: Jugular vein - Bilateral - Inspection Normal.  Palpation/Percussion:   Apical Impulse: - Normal.  Auscultation: Rhythm - Regular. Heart Sounds - S1 WNL and S2 WNL. No S3 or S4. Murmurs & Other Heart Sounds: Auscultation of the heart reveals - No Murmurs. Carotid arteries - No Carotid bruit. Peripheral Vascular   Upper Extremity: Inspection - Bilateral - No Cyanotic nailbeds or Digital clubbing. Lower Extremity:   Palpation: Dorsalis pedis pulse - Bilateral - Normal. Posterior tibia pulse - Bilateral - Normal. Edema - Bilateral - No edema. Assessment:       ICD-10-CM ICD-9-CM    1. Essential hypertension I10 401.9 AMB POC EKG ROUTINE W/ 12 LEADS, INTER & REP   2. Mixed hyperlipidemia E78.2 272.2    3. Obstructive sleep apnea (adult) (pediatric) G47.33 327.23        Plan:     1. HTN: BP controlled. Continue current meds. 2. Last FLP noted. On statin. 3. No significant CAD on cath last year and normal LVEF.

## 2019-08-22 ENCOUNTER — LAB ONLY (OUTPATIENT)
Dept: FAMILY MEDICINE CLINIC | Age: 69
End: 2019-08-22

## 2019-08-22 ENCOUNTER — HOSPITAL ENCOUNTER (OUTPATIENT)
Dept: LAB | Age: 69
Discharge: HOME | End: 2019-08-22
Payer: MEDICARE

## 2019-08-22 DIAGNOSIS — E78.2 MIXED HYPERLIPIDEMIA: ICD-10-CM

## 2019-08-22 DIAGNOSIS — I10 ESSENTIAL HYPERTENSION: ICD-10-CM

## 2019-08-22 DIAGNOSIS — R73.03 PREDIABETES: ICD-10-CM

## 2019-08-22 DIAGNOSIS — D69.6 THROMBOCYTOPENIA (HCC): ICD-10-CM

## 2019-08-22 DIAGNOSIS — D64.9 ANEMIA, UNSPECIFIED TYPE: ICD-10-CM

## 2019-08-22 DIAGNOSIS — E03.4 HYPOTHYROIDISM DUE TO ACQUIRED ATROPHY OF THYROID: ICD-10-CM

## 2019-08-22 DIAGNOSIS — R97.20 INCREASED PROSTATE SPECIFIC ANTIGEN (PSA) VELOCITY: ICD-10-CM

## 2019-08-22 LAB — HBA1C MFR BLD HPLC: 5.1 %

## 2019-08-22 PROCEDURE — 84443 ASSAY THYROID STIM HORMONE: CPT

## 2019-08-22 PROCEDURE — 80053 COMPREHEN METABOLIC PANEL: CPT

## 2019-08-22 PROCEDURE — 81003 URINALYSIS AUTO W/O SCOPE: CPT

## 2019-08-22 PROCEDURE — 85027 COMPLETE CBC AUTOMATED: CPT

## 2019-08-22 PROCEDURE — 84153 ASSAY OF PSA TOTAL: CPT

## 2019-08-22 PROCEDURE — 80061 LIPID PANEL: CPT

## 2019-08-23 LAB
ALBUMIN SERPL-MCNC: 2.9 G/DL (ref 3.6–4.8)
ALBUMIN/GLOB SERPL: 1 {RATIO} (ref 1.2–2.2)
ALP SERPL-CCNC: 152 IU/L (ref 39–117)
ALT SERPL-CCNC: 22 IU/L (ref 0–44)
APPEARANCE UR: CLEAR
AST SERPL-CCNC: 27 IU/L (ref 0–40)
BILIRUB SERPL-MCNC: 0.8 MG/DL (ref 0–1.2)
BILIRUB UR QL STRIP: NEGATIVE
BUN SERPL-MCNC: 22 MG/DL (ref 8–27)
BUN/CREAT SERPL: 16 (ref 10–24)
CALCIUM SERPL-MCNC: 8.4 MG/DL (ref 8.6–10.2)
CHLORIDE SERPL-SCNC: 108 MMOL/L (ref 96–106)
CHOLEST SERPL-MCNC: 86 MG/DL (ref 100–199)
CHOLEST/HDLC SERPL: 1.7 RATIO (ref 0–5)
CO2 SERPL-SCNC: 19 MMOL/L (ref 20–29)
COLOR UR: YELLOW
CREAT SERPL-MCNC: 1.36 MG/DL (ref 0.76–1.27)
ERYTHROCYTE [DISTWIDTH] IN BLOOD BY AUTOMATED COUNT: 14.7 % (ref 12.3–15.4)
GLOBULIN SER CALC-MCNC: 3 G/DL (ref 1.5–4.5)
GLUCOSE SERPL-MCNC: 147 MG/DL (ref 65–99)
GLUCOSE UR QL: NEGATIVE
HCT VFR BLD AUTO: 34.1 % (ref 37.5–51)
HDLC SERPL-MCNC: 50 MG/DL
HGB BLD-MCNC: 11.4 G/DL (ref 13–17.7)
HGB UR QL STRIP: NEGATIVE
INTERPRETATION, 910389: NORMAL
INTERPRETATION: NORMAL
KETONES UR QL STRIP: NEGATIVE
LDLC SERPL CALC-MCNC: 28 MG/DL (ref 0–99)
LEUKOCYTE ESTERASE UR QL STRIP: NEGATIVE
MCH RBC QN AUTO: 35.3 PG (ref 26.6–33)
MCHC RBC AUTO-ENTMCNC: 33.4 G/DL (ref 31.5–35.7)
MCV RBC AUTO: 106 FL (ref 79–97)
MICRO URNS: NORMAL
MORPHOLOGY BLD-IMP: ABNORMAL
NITRITE UR QL STRIP: NEGATIVE
PDF IMAGE, 910387: NORMAL
PH UR STRIP: 6 [PH] (ref 5–7.5)
PLATELET # BLD AUTO: 74 X10E3/UL (ref 150–450)
POTASSIUM SERPL-SCNC: 4.3 MMOL/L (ref 3.5–5.2)
PROT SERPL-MCNC: 5.9 G/DL (ref 6–8.5)
PROT UR QL STRIP: NEGATIVE
PSA SERPL-MCNC: 1.4 NG/ML (ref 0–4)
RBC # BLD AUTO: 3.23 X10E6/UL (ref 4.14–5.8)
REFLEX CRITERIA: NORMAL
SODIUM SERPL-SCNC: 138 MMOL/L (ref 134–144)
SP GR UR: 1.02 (ref 1–1.03)
TRIGL SERPL-MCNC: 40 MG/DL (ref 0–149)
TSH SERPL DL<=0.005 MIU/L-ACNC: 0.82 UIU/ML (ref 0.45–4.5)
UROBILINOGEN UR STRIP-MCNC: 0.2 MG/DL (ref 0.2–1)
VLDLC SERPL CALC-MCNC: 8 MG/DL (ref 5–40)
WBC # BLD AUTO: 3.8 X10E3/UL (ref 3.4–10.8)

## 2019-08-24 DIAGNOSIS — M79.89 LEG SWELLING: ICD-10-CM

## 2019-08-24 DIAGNOSIS — E87.6 HYPOKALEMIA: ICD-10-CM

## 2019-08-26 RX ORDER — SPIRONOLACTONE 25 MG/1
TABLET ORAL
Qty: 30 TAB | Refills: 0 | Status: SHIPPED | OUTPATIENT
Start: 2019-08-26 | End: 2019-08-29 | Stop reason: SDUPTHER

## 2019-08-29 ENCOUNTER — OFFICE VISIT (OUTPATIENT)
Dept: FAMILY MEDICINE CLINIC | Age: 69
End: 2019-08-29

## 2019-08-29 VITALS
HEIGHT: 68 IN | SYSTOLIC BLOOD PRESSURE: 115 MMHG | HEART RATE: 99 BPM | TEMPERATURE: 98.3 F | BODY MASS INDEX: 35.46 KG/M2 | OXYGEN SATURATION: 95 % | WEIGHT: 234 LBS | DIASTOLIC BLOOD PRESSURE: 49 MMHG | RESPIRATION RATE: 16 BRPM

## 2019-08-29 DIAGNOSIS — N40.1 BENIGN PROSTATIC HYPERPLASIA WITH URINARY FREQUENCY: ICD-10-CM

## 2019-08-29 DIAGNOSIS — E87.6 HYPOKALEMIA: ICD-10-CM

## 2019-08-29 DIAGNOSIS — I10 ESSENTIAL HYPERTENSION: ICD-10-CM

## 2019-08-29 DIAGNOSIS — E03.4 HYPOTHYROIDISM DUE TO ACQUIRED ATROPHY OF THYROID: ICD-10-CM

## 2019-08-29 DIAGNOSIS — K74.60 CIRRHOSIS OF LIVER WITHOUT ASCITES, UNSPECIFIED HEPATIC CIRRHOSIS TYPE (HCC): ICD-10-CM

## 2019-08-29 DIAGNOSIS — D69.6 THROMBOCYTOPENIA (HCC): ICD-10-CM

## 2019-08-29 DIAGNOSIS — M79.89 LEG SWELLING: ICD-10-CM

## 2019-08-29 DIAGNOSIS — N18.30 STAGE 3 CHRONIC KIDNEY DISEASE (HCC): ICD-10-CM

## 2019-08-29 DIAGNOSIS — J43.9 PULMONARY EMPHYSEMA, UNSPECIFIED EMPHYSEMA TYPE (HCC): Primary | ICD-10-CM

## 2019-08-29 DIAGNOSIS — D64.9 ANEMIA, UNSPECIFIED TYPE: ICD-10-CM

## 2019-08-29 DIAGNOSIS — G89.29 CHRONIC PAIN OF LEFT KNEE: ICD-10-CM

## 2019-08-29 DIAGNOSIS — G89.29 CHRONIC LEFT-SIDED THORACIC BACK PAIN: ICD-10-CM

## 2019-08-29 DIAGNOSIS — M54.6 CHRONIC LEFT-SIDED THORACIC BACK PAIN: ICD-10-CM

## 2019-08-29 DIAGNOSIS — R35.0 BENIGN PROSTATIC HYPERPLASIA WITH URINARY FREQUENCY: ICD-10-CM

## 2019-08-29 DIAGNOSIS — R73.01 IMPAIRED FASTING GLUCOSE: ICD-10-CM

## 2019-08-29 DIAGNOSIS — E78.2 MIXED HYPERLIPIDEMIA: ICD-10-CM

## 2019-08-29 DIAGNOSIS — R74.8 ELEVATED ALKALINE PHOSPHATASE LEVEL: ICD-10-CM

## 2019-08-29 DIAGNOSIS — M25.562 CHRONIC PAIN OF LEFT KNEE: ICD-10-CM

## 2019-08-29 RX ORDER — IPRATROPIUM BROMIDE 0.5 MG/2.5ML
0.5 SOLUTION RESPIRATORY (INHALATION)
Qty: 375 ML | Refills: 1 | Status: SHIPPED | OUTPATIENT
Start: 2019-08-29

## 2019-08-29 RX ORDER — METOPROLOL TARTRATE 50 MG/1
TABLET ORAL
Qty: 180 TAB | Refills: 1 | Status: SHIPPED | OUTPATIENT
Start: 2019-08-29

## 2019-08-29 RX ORDER — CALCIUM CARBONATE/VITAMIN D3 500-10/5ML
LIQUID (ML) ORAL
Qty: 90 CAP | Refills: 2 | Status: SHIPPED | OUTPATIENT
Start: 2019-08-29

## 2019-08-29 RX ORDER — LOSARTAN POTASSIUM 100 MG/1
TABLET ORAL
Qty: 90 TAB | Refills: 1 | Status: SHIPPED | OUTPATIENT
Start: 2019-08-29

## 2019-08-29 RX ORDER — TERAZOSIN 1 MG/1
CAPSULE ORAL
Qty: 90 CAP | Refills: 1 | Status: SHIPPED | OUTPATIENT
Start: 2019-08-29

## 2019-08-29 RX ORDER — MONTELUKAST SODIUM 10 MG/1
10 TABLET ORAL DAILY
Qty: 90 TAB | Refills: 3 | Status: SHIPPED | OUTPATIENT
Start: 2019-08-29

## 2019-08-29 RX ORDER — ALBUTEROL SULFATE 0.83 MG/ML
2.5 SOLUTION RESPIRATORY (INHALATION)
Qty: 180 EACH | Refills: 5 | Status: SHIPPED | OUTPATIENT
Start: 2019-08-29

## 2019-08-29 RX ORDER — LOVASTATIN 10 MG/1
10 TABLET ORAL
Qty: 90 TAB | Refills: 1 | Status: SHIPPED | OUTPATIENT
Start: 2019-08-29

## 2019-08-29 RX ORDER — SPIRONOLACTONE 25 MG/1
TABLET ORAL
Qty: 90 TAB | Refills: 1 | Status: SHIPPED | OUTPATIENT
Start: 2019-08-29

## 2019-08-29 RX ORDER — LIDOCAINE HYDROCHLORIDE 10 MG/ML
1 INJECTION INFILTRATION; PERINEURAL ONCE
Qty: 1 VIAL | Refills: 0
Start: 2019-08-29 | End: 2019-08-29

## 2019-08-29 RX ORDER — FLUTICASONE PROPIONATE AND SALMETEROL 500; 50 UG/1; UG/1
1 POWDER RESPIRATORY (INHALATION) EVERY 12 HOURS
Qty: 3 INHALER | Refills: 1 | Status: SHIPPED | OUTPATIENT
Start: 2019-08-29

## 2019-08-29 RX ORDER — LEVOTHYROXINE SODIUM 100 UG/1
100 TABLET ORAL
Qty: 90 TAB | Refills: 1 | Status: SHIPPED | OUTPATIENT
Start: 2019-08-29

## 2019-08-29 RX ORDER — METHYLPREDNISOLONE ACETATE 40 MG/ML
40 INJECTION, SUSPENSION INTRA-ARTICULAR; INTRALESIONAL; INTRAMUSCULAR; SOFT TISSUE ONCE
Qty: 1 ML | Refills: 0
Start: 2019-08-29 | End: 2019-08-29

## 2019-08-29 NOTE — PROGRESS NOTES
Chief Complaint   Patient presents with    Follow-up     1. Have you been to the ER, urgent care clinic since your last visit? Hospitalized since your last visit? Yes When: 6/1/19-[facility] leg  pain    2. Have you seen or consulted any other health care providers outside of the [address] [place] [address] since your last visit? Include any pap smears or colon screening. Yes When: cardiology     Patient has left knee pain. [address]  OFFICE PROCEDURE PROGRESS NOTE        Chart reviewed for the following:   Lachelle Crowell LPN, have reviewed the History, Physical and updated the Allergic reactions for [place] performed immediately prior to start of procedure:   Lachelle Crowell LPN, have performed the following reviews on [facility] prior to the start of the procedure:            * Patient was identified by name and date of birth   * Agreement on procedure being performed was verified  * Risks and Benefits explained to the patient  * Procedure site verified and marked as necessary  * Patient was positioned for comfort  * Consent was signed and verified     Time: 9:40 AM      Date of procedure: 8/29/2019    Procedure performed by:  Sam Sherman NP    Provider assisted by:  Jj Clark LPN    Patient assisted by: self    How tolerated by patient: tolerated the procedure well with no complications    Post Procedural Pain Scale: 9    Comments:  Patient consents to injection of Depo Medrol in left knee. Procedure explained to patient by provider and patient verbalized understanding to nurse.

## 2019-08-29 NOTE — PROGRESS NOTES
HISTORY OF PRESENT ILLNESS  Babak Guillaume is a 77 y.o. male. HPI  Here today for follow up after having fasting labs drawn last week. 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 PSHx is significant for rotator cuff surgeries, cataract surgery, hernia repair. FHx is significant for heart disease. Was referred t to dermatology for right hand lesion, but he has not yet scheduled that appt and does not intend to. Was also referred for colonoscopy, but reports that he does not have a rideand can't afford the $150 they were asking for. FIT test was positive in January 2019 and he was strongly advised to see GI, but he has not had this done. Recent Hgb has dropped nearly 3 pts from 14.3 (Jan 2019) to 11.9 (May 2019) to 11.4 (August 2019). since January. He declines any fatigue, unusual bleeding. Reports today that he thinks he was just \"wiping too hard. \"  His PSA had also increased to 1.6 from 0.6 in 2017 and he was advised to see urology; he has not done that either. He reports today that he will not see any specialists because of the cost. He reports that he does not have any discretionary income due to the cost of his and his wife's medications. He has lost about 14 lbs since his last visit here in May 2019. He attributes this to improved dietary habits. He went to the ED in June 2019 for left knee/hip pain. He has been having left knee pain since that time. No known injury. He woke up with the pain. Pain is worse when he is walking on it, improves with rest. No history of problems with this knee. COPD with emphysema  Takes spiriva 18mcg once daily, advair 500-50mcg twice daily, and uses albuterol and atrovent twice daily for COPD. He also uses the albuterol PRN, but he has not needed it. Takes singulair 10mg daily as well. He is a former smoker. He quit in approximately 2014. Was previously smoking 1/2 - 1ppd.  He still vapes, but is working on quitting. Does not see pulmonology. Denies history of intubation. HTN  Patient takes losartan 100mg daily and metoprolol 50mg BID regularly. Patient does not check blood pressure at home routinely. Denies chest pain, shortness of breath, headache, lightheadedness, peripheral edema. Blood pressure in office today is 115/49, which is at goal.   Takes spironolactone 25mg PRN edema. Saw Dr. Chela Antony in May 2018 and had a normal cardiac cath after having an abnormal stress echo. Last saw Dr. Chela Antony in August 2019, f/u in 1 year. Hyperlipidemia  Takes lovastatin 20mg once daily. Denies RUQ pain or myalgias. Not exercising. Working on diet. Recent lipid panel (August 2019) is excellent. Thrombocytopenia 2/2 alcoholic cirrhosis  PLT 74  (August 2019) down from 80 (May 2019) down from 99 (Dec 2018), up from 80 in July 2018. Denies any abnormal bleeding. Does not see heme-onc routinely. Alcoholic cirrhosis  He has a history of alcoholic cirrhosis. He quit drinking in [address] [place] [address] after a history of heavy drinking prior to that. He was hospitalized in 2010 and was told \"it's not that bad. \" He does not see a liver/GI specialist.   Last abd ultrasound was in August 2013, which showed hepatic cirrhosis with no focal abnormality to suggest neoplasm, per chart review. A repeat liver ultrasound was ordered in January 2019, but he has not scheduled this and does not intend to. Recent LFTs were normal with exception of elevated alk phos of 152, which is stable. Hypothyroidism  Takes levothyroxine 100 mcg once daily, increased after his last visit in May 2019 when TSH was mildly elevated. His hypothyroidism was diagnosed in 2010. Most recent TSH was 0.818 (August 2019) improved from 4.810 (May 2019), up from 1.970 in Dec 2018. Denies fatigue, heat/cold intolerance, bowel/skin changes.  Has lost about 14 lbs since this medication change, though this may be multifactorial.     BPH  Has a history of BPH, but patient states \"they haven't told me I have it. \" He takes terazosin 1mg nightly. Denies family history of prostate CA. Gets up 0 - 1 times nightly to urinate. PSA (Dec 2018) was 1.7, which is increased from 0.6 one year prior. I referred him to urology, but he does not plan to go. Repeat PSA August 2019 was 1.4. Acute on chronic thoracic back pain  Has acute on chronic thoracic back pain. Reports \"I know I have a pinched disc. \" Wondering if there is anything he can take for it. No falls or injury. No numbness/tingling or change in bowel/bladder habits. Was started on diclofenac at his last visit, but reports that it is not helping. Reports that he has been to back specialists before, \"burst a disc in between L3 and L4 in 1993\". Was told that surgery would be his only option and that it had only a 25% chance of working and a higher chance of paralysis. Has not seen an orthopedic specialist since the mid-1990s. Had a normal thoracic X-ray in July 2017. Does not want to do PT or see orthopedics due to cost. Had a thoracic spine X-ray done in May 2019 that showed minimal degenerative changes. He is taking 800mg ibuprofen 4 times daily to deal with the pain. Current Specialists:  1. Dr. Niko Frost, cardiology - last visit Aug 2019, return in 1 yr    Preventative Care  TDaP: Dec 2018  Colonoscopy: 1969, per patient report; \"I ain't never had no problems\" but was referred at his last visit; has not yet scheduled due to lack of transportation and cost    Healthy Habits  Patient is exercising 0x per week, \"I don't have the breath to do that. \"   Patient endorses mostly healthy diet; avoids fatty/greasy foods, sugar-sweetened beverages. Denies tobacco use. He quit in 2014. Denies alcohol use; former alcoholic and quit in 0600. Denies illicit drug use. Sexually active with 1 female partner in last 12 months. Uses viagra PRN for ED, about 4x weekly.      Lab Review (August 2019):  Normals: TSH, UA, lipid panel (TC 86, LDL 28), HgA1C (5.1%)  Abnormals: PSA (1.4), CMP (GFR 53, Ca 8.4, protein 5.9, albumin 2.9, alk phos 152), CBC (Hgb 11.4 down from 11.9, PLT 74 but stable)    Past Medical History:   Diagnosis Date    Blurred vision     Chronic obstructive pulmonary disease (HCC)     Cirrhosis, alcoholic (HCC)     COPD bronchitis     Ear discomfort     History of ETOH abuse     HTN (hypertension)     SOB (shortness of breath)     Thrombocytopenia (HCC)     Thyroid disease     Trouble in sleeping      Past Surgical History:   Procedure Laterality Date    HX CATARACT REMOVAL Right 2007    HX CATARACT REMOVAL Left 2012    HX CIRCUMCISION      at 24 y/o    HX HERNIA REPAIR  as a youth    right side    HX SHOULDER ARTHROSCOPY      left x 3 for torn rotator cuff muscle     Family History   Problem Relation Age of Onset    Hypertension Father     Heart Disease Father         pacemaker,  of MI @ 80    Hypertension Mother     Cancer Mother         breast with spread to bones    Diabetes Neg Hx      Social History     Socioeconomic History    Marital status:      Spouse name: Not on file    Number of children: Not on file    Years of education: Not on file    Highest education level: Not on file   Tobacco Use    Smoking status: Former Smoker     Types: Cigarettes     Last attempt to quit: 2014     Years since quittin.8    Smokeless tobacco: Former User    Tobacco comment: uses vap   Substance and Sexual Activity    Alcohol use: No     Alcohol/week: 0.0 standard drinks     Comment: Quit Oct 21, 1985    Drug use: No    Sexual activity: Yes     Partners: Female     Birth control/protection: None     Patient Active Problem List   Diagnosis Code    Obstructive sleep apnea (adult) (pediatric) G47.33    COPD [facility] pulmonary disease) [facility] J44.9    Cirrhosis ([facility] K74.60    History of ETOH abuse Z87.898    Thrombocytopenia ([facility] D69.6    Lower urinary tract symptoms (LUTS) R39.9    Hypothyroidism due to acquired atrophy of thyroid E03.4    Essential hypertension I10    Chronic left-sided thoracic back pain M54.6, G89.29    Severe obesity (BMI 35.0-39. 9) with comorbidity (HCC) E66.01    Angina, class III (HCC) I20.9    Abnormal stress test R94.39    Leg swelling M79.89    Mixed hyperlipidemia E78.2    Increased prostate specific antigen (PSA) velocity R97.20    Anemia D64.9    Elevated alkaline phosphatase level R74.8    Stage 3 chronic kidney disease (HCC) N18.3    Impaired fasting glucose R73.01     Outpatient Encounter Medications as of 8/29/2019   Medication Sig Dispense Refill    albuterol (PROVENTIL VENTOLIN) 2.5 mg /3 mL (0.083 %) nebu 3 mL by Nebulization route every four (4) hours as needed (wheezing or shortness of breath). 180 Each 5    fluticasone propion-salmeterol (ADVAIR DISKUS) 500-50 mcg/dose diskus inhaler Take 1 Puff by inhalation every twelve (12) hours. 3 Inhaler 1    ipratropium (ATROVENT) 0.02 % soln 2.5 mL by Nebulization route four (4) times daily as needed (wheezing or shortness of breath). 375 mL 1    levothyroxine (SYNTHROID) 100 mcg tablet Take 1 Tab by mouth Daily (before breakfast). 90 Tab 1    losartan (COZAAR) 100 mg tablet TAKE ONE TABLET BY MOUTH DAILY 90 Tab 1    lovastatin (MEVACOR) 10 mg tablet Take 1 Tab by mouth nightly. 90 Tab 1    magnesium oxide 400 mg magnesium cap 1qd for toe cramps 90 Cap 2    metoprolol tartrate (LOPRESSOR) 50 mg tablet TAKE ONE TABLET BY MOUTH TWICE A  Tab 1    montelukast (SINGULAIR) 10 mg tablet Take 1 Tab by mouth daily. 90 Tab 3    spironolactone (ALDACTONE) 25 mg tablet TAKE ONE TABLET BY MOUTH DAILY AS NEEDED 90 Tab 1    terazosin (HYTRIN) 1 mg capsule Take 1 capsule by mouth once nightly. 90 Cap 1    tiotropium (SPIRIVA) 18 mcg inhalation capsule Take 1 Cap by inhalation daily.  90 Cap 1    methylPREDNISolone acetate (DEPO-MEDROL) 40 mg/mL injection 1 mL by IntraMUSCular route once for 1 dose. 1 mL 0    lidocaine (XYLOCAINE) 10 mg/mL (1 %) injection 1 mL by Intra artICUlar route once for 1 dose. 1 Vial 0    aspirin delayed-release 81 mg tablet Take  by mouth daily.  [DISCONTINUED] spironolactone (ALDACTONE) 25 mg tablet TAKE ONE TABLET BY MOUTH DAILY AS NEEDED 30 Tab 0    [DISCONTINUED] terazosin (HYTRIN) 1 mg capsule TAKE ONE CAPSULE BY MOUTH ONCE NIGHTLY 30 Cap 6    VIAGRA 100 mg tablet Take 1 Tab by mouth as needed (prior to sexual activity). 30 Tab 0    [DISCONTINUED] predniSONE (DELTASONE) 10 mg tablet Take 6 tablets once by mouth with food on day 1; Take 5 tablets once by mouth with food on day 2; Take 4 tablets once by mouth with food on day 3; Take 3 tablets once by mouth with food on day 4; Take 2 tablets once by mouth with food on day 5; Take 1 tablet once by mouth with food on day 6 (#21) (Patient not taking: Reported on 8/20/2019) 21 Tab 0    [DISCONTINUED] levothyroxine (SYNTHROID) 100 mcg tablet Take 1 Tab by mouth Daily (before breakfast). 30 Tab 3    [DISCONTINUED] lovastatin (MEVACOR) 20 mg tablet Take 1 Tab by mouth nightly. 90 Tab 1    [DISCONTINUED] losartan (COZAAR) 100 mg tablet TAKE ONE TABLET BY MOUTH DAILY 90 Tab 1    [DISCONTINUED] montelukast (SINGULAIR) 10 mg tablet Take 1 Tab by mouth daily. 90 Tab 3    [DISCONTINUED] albuterol (PROVENTIL VENTOLIN) 2.5 mg /3 mL (0.083 %) nebulizer solution 3 mL by Nebulization route every four (4) hours as needed for Wheezing. 180 Each 5    [DISCONTINUED] ipratropium (ATROVENT) 0.02 % soln 2.5 mL by Nebulization route four (4) times daily as needed. 375 mL 1    [DISCONTINUED] magnesium oxide 400 mg cap 1qd for toe cramps 90 Cap 2    [DISCONTINUED] tiotropium (SPIRIVA) 18 mcg inhalation capsule Take 1 Cap by inhalation daily.  30 Cap 11    [DISCONTINUED] metoprolol tartrate (LOPRESSOR) 50 mg tablet TAKE ONE TABLET BY MOUTH TWICE A  Tab 1    [DISCONTINUED] fluticasone-salmeterol (ADVAIR DISKUS) 500-50 mcg/dose diskus inhaler Take 1 Puff by inhalation every twelve (12) hours. No facility-administered encounter medications on file as of 8/29/2019. Visit Vitals  /49 (BP 1 Location: Right arm, BP Patient Position: Sitting)   Pulse 99   Temp 98.3 °F (36.8 °C) (Oral)   Resp 16   Ht 5' 8\" (1.727 m)   Wt 234 lb (106.1 kg)   SpO2 95%   BMI 35.58 kg/m²         Review of Systems   Constitutional: Positive for weight loss. Negative for chills, fever and malaise/fatigue. Eyes: Negative for blurred vision and double vision. Respiratory: Negative for cough, shortness of breath and wheezing. Cardiovascular: Negative for chest pain, palpitations, orthopnea and leg swelling. Gastrointestinal: Negative for abdominal pain, blood in stool, constipation and diarrhea. Genitourinary: Negative for dysuria, frequency, hematuria and urgency. Musculoskeletal: Positive for back pain, joint pain and myalgias. Neurological: Negative for dizziness, tingling and headaches. Endo/Heme/Allergies: Negative for polydipsia. Psychiatric/Behavioral: Negative for depression, substance abuse and suicidal ideas. Physical Exam   Constitutional: He is oriented to person, place, and time. He appears well-developed and well-nourished. No distress. HENT:   Head: Normocephalic and atraumatic. Cardiovascular: Normal rate, regular rhythm and normal heart sounds. Pulmonary/Chest: Effort normal and breath sounds normal. No respiratory distress. He has no wheezes. Musculoskeletal:        Left knee: He exhibits decreased range of motion, swelling and effusion. He exhibits no deformity, no laceration, no erythema, normal alignment and normal meniscus. Tenderness found. Neurological: He is alert and oriented to person, place, and time. Skin: Skin is warm and dry. He is not diaphoretic. Psychiatric: He has a normal mood and affect.  His behavior is normal. Judgment normal.   Nursing note and vitals reviewed. ASSESSMENT and PLAN    ICD-10-CM ICD-9-CM    1. Pulmonary emphysema, unspecified emphysema type (HCC) J43.9 492.8 albuterol (PROVENTIL VENTOLIN) 2.5 mg /3 mL (0.083 %) nebu      fluticasone propion-salmeterol (ADVAIR DISKUS) 500-50 mcg/dose diskus inhaler      ipratropium (ATROVENT) 0.02 % soln      montelukast (SINGULAIR) 10 mg tablet      tiotropium (SPIRIVA) 18 mcg inhalation capsule   2. Elevated alkaline phosphatase level R74.8 790.5 URINALYSIS W/ RFLX MICROSCOPIC   3. Chronic left-sided thoracic back pain M54.6 724.1     G89.29 338.29    4. Mixed hyperlipidemia E78.2 272.2 lovastatin (MEVACOR) 10 mg tablet      LIPID PANEL AND CHOL/HDL RATIO   5. Hypothyroidism due to acquired atrophy of thyroid E03.4 244.8 levothyroxine (SYNTHROID) 100 mcg tablet     246.8 TSH 3RD GENERATION      T4, FREE   6. Essential hypertension I10 401.9 metoprolol tartrate (LOPRESSOR) 50 mg tablet      CBC W/O DIFF      METABOLIC PANEL, COMPREHENSIVE      URINALYSIS W/ RFLX MICROSCOPIC   7. Cirrhosis of liver without ascites, unspecified hepatic cirrhosis type (HCC) D93.19 815.6 METABOLIC PANEL, COMPREHENSIVE   8. Thrombocytopenia (HCC) D69.6 287.5 CBC W/O DIFF   9. Anemia, unspecified type D64.9 285.9 CBC W/O DIFF   10. Chronic pain of left knee M25.562 719.46 XR KNEE LT 3 V    G89.29 338.29 methylPREDNISolone acetate (DEPO-MEDROL) 40 mg/mL injection      lidocaine (XYLOCAINE) 10 mg/mL (1 %) injection      OR DRAIN/INJECT LARGE JOINT/BURSA   11. Hypokalemia E87.6 276.8 losartan (COZAAR) 100 mg tablet      spironolactone (ALDACTONE) 25 mg tablet      METABOLIC PANEL, COMPREHENSIVE   12. Leg swelling M79.89 729.81 spironolactone (ALDACTONE) 25 mg tablet   13. Benign prostatic hyperplasia with urinary frequency N40.1 600.01 terazosin (HYTRIN) 1 mg capsule    R35.0 788.41    14. Impaired fasting glucose R73.01 790.21 HEMOGLOBIN A1C WITH EAG   15. Stage 3 chronic kidney disease (HCC) N18.3 585.3      1.  Hypertension - Well-controlled; continue current medications. 2. HLD - Extremely low; will decrease lovastatin dose to 10mg daily. 3. Anemia - Again, we discussed how his constellation of symptoms (anemia, + FIT test, weight loss) lead me to worry about a GI malignancy and I urged him to see GI. He declines but verbalized understanding of the risks of this approach. 4. Thrombocytopenia/cirrhosis - PLT and alk phos level stable. Encouraged him to schedule abdominal ultrasound as previously ordered. 5. Lab abnormalities - Encouraged increased protein, calcium intake. 6. Chronic back pain - Encouraged him to stop taking ibuprofen, as this is likely worsening his kidney function and his anemia. I again urged him to see orthopedics, but he declines. 7. BPH - Appears well-controlled. PSA has stabilized. I again asked him to see urology, and he declines. 8. Left knee pain - X-ray today shows some arthritic changes, chondrocalcinosis of meniscus. We discussed therapeutic options, and he elected to proceed with left knee injection today. After informed consent was obtained, which detailed the risks of increased pain, swelling, infection, tendon rupture, skin depigmentation/atrophy, bruising, bleeding, and cellulitis, a time out was performed. The site was prepped in a sterile fashion using alcohol. 1cc of lidocaine and 1cc of depo-medrol were injection into the left knee via the anterolateral approach. No complications were immediately evident. The patient tolerated the procedure well, with relief of symptoms afterward. The procedure site was bandaged, and aftercare instructions were given to the patient. 9. Hypothyroidism - TSH at goal; continue levothyroxine 100mcg daily. It is possible that at least some of his weight loss is due to optimization of his thyroid levels. 10. COPD - Well-controlled on current regimen; continue. Encouraged him to stop vaping entirely.      As above, I urged him to follow up with specialists, though I am not confident that he will do so. Risks of this approach were discussed, and he verbalized understanding. Return in 5 months for fasting labs and AWV/lab/med follow up 1 week later, sooner PRN. Follow-up and Dispositions    · Return in about 5 months (around 1/29/2020) for fasting labs and AWV/lab/med f/u 1 week later, sooner PRN.

## 2019-10-17 DIAGNOSIS — E11.9 TYPE 2 DIABETES MELLITUS WITHOUT COMPLICATION, WITHOUT LONG-TERM CURRENT USE OF INSULIN (HCC): ICD-10-CM

## 2019-10-22 RX ORDER — LOVASTATIN 20 MG/1
TABLET ORAL
Qty: 90 TAB | Refills: 0 | OUTPATIENT
Start: 2019-10-22

## 2019-12-10 ENCOUNTER — TELEPHONE (OUTPATIENT)
Dept: FAMILY MEDICINE CLINIC | Age: 69
End: 2019-12-10

## 2019-12-10 NOTE — TELEPHONE ENCOUNTER
Patient left me a voicemail message asking about refills for his PAP (patient assistance program) medications. The patient was enrolled in the [employer] program through Emily Bhat, Patient Advocate, for his Advair, Spiriva, and Viagra prescriptions. Mignon Klinefelter retired in July and her duties were absorbed by me as the new Pharmacy Assistance  for both the [address] [place] [address] and interested [employer] [facility]. Return call made to the patient. There was no answer, so I left a message on his voicemail. Telephone call made to patient's PCP, Kedar FONSECA. Per the office, Yaneth Rodriguez is currently on leave. I was transferred to a nurse line voicemail and left a message explaining that the patient's PAP applications are about to . In order for me to assist the patient to renew his applications, the practice first needs to complete two enrollment forms. Awaiting a return call.  Navjot Underwood RN

## 2019-12-30 ENCOUNTER — TELEPHONE (OUTPATIENT)
Dept: FAMILY MEDICINE CLINIC | Age: 69
End: 2019-12-30

## 2019-12-30 NOTE — TELEPHONE ENCOUNTER
Telephone call received from the patient asking about the status of his medication refills through the assistance program.    (Patient worked with Aarti Gaines, [employer], to order Advair, Spiriva, and Viagra from the [facility] [employer] assistance programs. The [employer] program closed in July after Bekah's longterm. The [address] [place] [address] now provides [employer] for it's patients and any interested [facility].)    Explained to the patient that I have not received a reply from [facility] yet about whether they will be accepting the pharmacy assistance program services now offered through the [address] [place] [address]. Until then, I can not order any refills for him. Patient stated that he has not spoken with the office ([facility]) about his need for medication refills. He also stated that his usual provider, Britney Henry, has been out on leave and his next appointment is scheduled for 02-. Telephone call made to [facility] again today to discuss the patient's need for medication refills. I was not able to reach a person to speak with or leave a message. Fax sent to [facility], [phone], regarding the patient's need for refills and an outline of the pharmacy assistance program the [address] [place] [address] is offering to provide.  Xenia White RN

## 2020-01-22 ENCOUNTER — TELEPHONE (OUTPATIENT)
Dept: FAMILY MEDICINE CLINIC | Age: 70
End: 2020-01-22

## 2020-01-22 NOTE — TELEPHONE ENCOUNTER
Return call made to the patient who left a voicemail message for me yesterday asking about the status of his PAP medications, Advair and Spiriva. Encouraged the patient to call his PCP at [facility] to ask about his medications. [facility] has not enrolled with the Pharmacy Assistance Program at the [address] [place] [address] so I am unable at this time to assist him with his PAP applications. The patient expressed understanding and stated he will make the call to his PCP.  Peyton Dougherty RN

## 2020-02-05 ENCOUNTER — TELEPHONE (OUTPATIENT)
Dept: FAMILY MEDICINE CLINIC | Age: 70
End: 2020-02-05

## 2020-02-05 NOTE — TELEPHONE ENCOUNTER
A representative from [address] [place] [address] stopped by today. Her name is Dayan Levine. She is taking care of the patients Hospice care request. She picked up office notes from the office but was wondering could Jessica Rivers write a letter suggesting that the patient is a candidate for hospice care. She said that the letter can be faxed.  If you have any questions, her office number is [phone] and her cell number is [phone]    Attention:    Dayan Levine   [address]      Fax number: [phone]

## 2020-02-06 NOTE — TELEPHONE ENCOUNTER
Verified patient with two types of identifiers. States that he is being reviewed for hospice care. States that they are going to have their MD see patient and evaluate for hospice. Advised her that he does have a pending appt with us and if anything is needed we can take care of it at the appt.

## 2020-04-01 ENCOUNTER — TELEPHONE (OUTPATIENT)
Dept: FAMILY MEDICINE CLINIC | Age: 70
End: 2020-04-01

## 2020-04-01 NOTE — TELEPHONE ENCOUNTER
Called patient to schedule virtual medicare wellness visit.  All three numbers on file are either out of service or disconnected    Home Phone:   [phone]   Mobile:   [phone]   Home Phone:   [phone]

## 2021-07-06 NOTE — PROGRESS NOTES
Radha Meyer DNP, ANP-BC  Subjective/HPI:     Towana Riedel is a 80 y.o. male is here for new patient consultation regarding progressive episodes of dyspnea on exertion fatigue and chest discomfort. Patient has had a outpatient stress echo performed, results indicated below. He had poor exercise performance of barely getting to stage II  reporting significant dyspnea on exertion, her heart rate was achieved echocardiogram images indicated diffuse hypokinesis. He also developed an arrhythmia of SVT which broke. His cardiac risk factors include male, hypertension, hyperlipidemia, type 2 diabetes, former smoker, obesity and family history of heart disease. He reports for most of this year his activities which he once was able to perform such as walking up an incline did not trigger as much  dyspnea as it does now. He denies dependent edema. He has obstructive sleep apnea, he uses a CPAP. He was questioning with he would benefit from oxygen therapy in reviewing primary care note walking did not decrease his O2 sats. Guarding is elevated blood pressure today, he did not take any medications. PCP Provider  Stormy Dumont, ANATOLIY  Past Medical History:   Diagnosis Date    Blurred vision     Cirrhosis, alcoholic ([address]     COPD bronchitis     Diabetes mellitus ([address]     Ear discomfort     History of ETOH abuse     HTN (hypertension)     SOB (shortness of breath)     Thrombocytopenia ([address]     Trouble in sleeping       Past Surgical History:   Procedure Laterality Date    HX CATARACT REMOVAL Right 2007    HX CATARACT REMOVAL Left 2012    HX CIRCUMCISION      at 26 y/o    HX HERNIA REPAIR  as a youth    right side    HX SHOULDER ARTHROSCOPY      left x 3 for torn rotator cuff muscle     Allergies   Allergen Reactions    Bactrim [Sulfamethoprim Ds] Diarrhea and Nausea and Vomiting     After on 1st dose.     Morphine Unknown (comments)      Family History   Problem Relation Age of Onset  Diabetes Neg Hx     Hypertension Father     Heart Disease Father      pacemaker,  of MI @ 80    Hypertension Mother     Cancer Mother      breast with spread to bones      Current Outpatient Prescriptions   Medication Sig    metFORMIN ER (GLUCOPHAGE XR) 500 mg tablet TAKE ONE TABLET BY MOUTH ONE TIME DAILY    terazosin (HYTRIN) 1 mg capsule TAKE ONE CAPSULE BY MOUTH ONCE NIGHTLY    metoprolol tartrate (LOPRESSOR) 50 mg tablet TAKE ONE TABLET BY MOUTH TWICE A DAY    losartan (COZAAR) 100 mg tablet Take 1 Tab by mouth daily.  montelukast (SINGULAIR) 10 mg tablet Take 1 Tab by mouth daily.  albuterol (PROVENTIL VENTOLIN) 2.5 mg /3 mL (0.083 %) nebulizer solution 3 mL by Nebulization route every four (4) hours as needed for Wheezing.  ipratropium (ATROVENT) 0.02 % soln 2.5 mL by Nebulization route four (4) times daily as needed.  VIAGRA 100 mg tablet Take 1 Tab by mouth as needed.  lovastatin (MEVACOR) 20 mg tablet Take 1 Tab by mouth nightly.  levothyroxine (SYNTHROID) 88 mcg tablet TAKE ONE TABLET BY MOUTH EVERY MORNING BEFORE BREAKFAST    fluticasone-salmeterol (ADVAIR DISKUS) 500-50 mcg/dose diskus inhaler Take 1 Puff by inhalation every twelve (12) hours. No current facility-administered medications for this visit. Vitals:    18 1014 18 1030   BP: 150/70 140/72   Pulse: 90    Resp: 20    SpO2: 96%    Weight: 242 lb 1.6 oz (109.8 kg)    Height: 5' 9\" (1.753 m)      Social History     Social History    Marital status:      Spouse name: N/A    Number of children: N/A    Years of education: N/A     Occupational History    Not on file.      Social History Main Topics    Smoking status: Former Smoker     Types: Cigarettes     Quit date: 2014    Smokeless tobacco: Former User      Comment: uses vap    Alcohol use No      Comment: Quit Oct 21, 1985    Drug use: No    Sexual activity: Not on file     Other Topics Concern    Not on file     Social History Narrative       I have reviewed the nurses notes, vitals, problem list, allergy list, medical history, family, social history and medications. Review of Symptoms:    General: Pt denies excessive weight gain or loss. Pt is able to conduct ADL's  HEENT: Denies blurred vision, headaches, epistaxis and difficulty swallowing. Respiratory: Denies resting shortness of breath, + PERRY, + occasional wheezing denies stridor. Cardiovascular: Sign chest discomfort, palpitations, edema or PND  Gastrointestinal: Denies poor appetite, indigestion, abdominal pain or blood in stool  Musculoskeletal: Denies pain or swelling from muscles or joints  Neurologic: Denies tremor, paresthesias, or sensory motor disturbance  Skin: Denies rash, itching or texture change. Patient reports easily bruising on forearms      Physical Exam:      General: Well developed, in no acute distress, cooperative and alert  HEENT: No carotid bruits, no JVD, trach is midline. Neck Supple, PEERL, EOM intact. Heart:  Normal S1/S2 negative S3 or S4. Regular, no murmur, gallop or rub.   Respiratory: Clear bilaterally x 4, no wheezing or rales  Abdomen:   Soft, non-tender, no masses, bowel sounds are active.   Extremities:  No edema, normal cap refill, no cyanosis, atraumatic. Neuro: A&Ox3, speech clear, gait stable. Skin: Skin color is normal. No rashes or lesions.  Non diaphoretic  Vascular: 2+ pulses symmetric in all extremities  Psychiatric: Well versed, no signs of agitation, denies depression  Cardiographics    ECG:Sinus rhythm with poor R-wave progression in the anterior septal leads  Results for orders placed or performed during the hospital encounter of 01/18/18   EKG, 12 LEAD, INITIAL   Result Value Ref Range    Ventricular Rate 105 BPM    Atrial Rate 105 BPM    P-R Interval 194 ms    QRS Duration 98 ms    Q-T Interval 362 ms    QTC Calculation (Bezet) 478 ms    Calculated P Axis 64 degrees    Calculated R Axis -48 degrees    Calculated T Axis 61 degrees    Diagnosis       Sinus tachycardia  Left anterior fascicular block  Poor R-wave Progression (consider lead placement or loss of anterior forces)  Confirmed by Philippe Mendez MD, Iva Stein ([id number]) on 1/18/2018 4:53:35 PM           Cardiology Labs:  Lab Results   Component Value Date/Time    Cholesterol, total 111 10/25/2017 10:28 AM    HDL Cholesterol 60 10/25/2017 10:28 AM    LDL,Direct 73 04/15/2013 09:28 AM    LDL, calculated 35.4 10/25/2017 10:28 AM    Triglyceride 78 10/25/2017 10:28 AM    CHOL/HDL Ratio 1.9 10/25/2017 10:28 AM       Lab Results   Component Value Date/Time    Sodium 143 05/08/2018 09:54 AM    Potassium 3.6 05/08/2018 09:54 AM    Chloride 109 (H) 05/08/2018 09:54 AM    CO2 26 05/08/2018 09:54 AM    Anion gap 8 05/08/2018 09:54 AM    Glucose 86 05/08/2018 09:54 AM    BUN 14 05/08/2018 09:54 AM    Creatinine 1.10 05/08/2018 09:54 AM    BUN/Creatinine ratio 13 05/08/2018 09:54 AM    GFR est AA >60 05/08/2018 09:54 AM    GFR est non-AA >60 05/08/2018 09:54 AM    Calcium 8.7 05/08/2018 09:54 AM    Bilirubin, total 0.9 03/28/2018 09:55 AM    AST (SGOT) 53 (H) 03/28/2018 09:55 AM    Alk. phosphatase 139 (H) 03/28/2018 09:55 AM    Protein, total 6.9 03/28/2018 09:55 AM    Albumin 2.9 (L) 03/28/2018 09:55 AM    Globulin 4.0 03/28/2018 09:55 AM    A-G Ratio 0.7 (L) 03/28/2018 09:55 AM    ALT (SGPT) 52 03/28/2018 09:55 AM           Assessment:     Assessment:     Diagnoses and all orders for this visit:    1. PERRY (dyspnea on exertion)  -     CARDIAC CATHETERIZATION; Future  -     METABOLIC PANEL, COMPREHENSIVE  -     CK  -     CBC WITH AUTOMATED DIFF  -     2D ECHO COMPLETE ADULT (TTE) W OR WO CONTR    2. Essential hypertension  -     AMB POC EKG ROUTINE W/ 12 LEADS, INTER & REP  -     AMB POC EKG ROUTINE W/ 12 LEADS, INTER & REP  -     CARDIAC CATHETERIZATION;  Future  -     METABOLIC PANEL, COMPREHENSIVE  -     CK  -     CBC WITH AUTOMATED DIFF  -     2D ECHO COMPLETE ADULT (TTE) W OR WO CONTR    3. Type 2 diabetes mellitus with nephropathy ([facility]  -     CARDIAC CATHETERIZATION; Future  -     METABOLIC PANEL, COMPREHENSIVE  -     CK  -     CBC WITH AUTOMATED DIFF  -     2D ECHO COMPLETE ADULT (TTE) W OR WO CONTR    4. Pulmonary emphysema, unspecified emphysema type ([facility]  -     CARDIAC CATHETERIZATION; Future  -     METABOLIC PANEL, COMPREHENSIVE  -     CK  -     CBC WITH AUTOMATED DIFF  -     2D ECHO COMPLETE ADULT (TTE) W OR WO CONTR    5. Obstructive sleep apnea (adult) (pediatric)  -     CARDIAC CATHETERIZATION; Future  -     METABOLIC PANEL, COMPREHENSIVE  -     CK  -     CBC WITH AUTOMATED DIFF  -     2D ECHO COMPLETE ADULT (TTE) W OR WO CONTR    6. High cholesterol  -     CARDIAC CATHETERIZATION; Future  -     METABOLIC PANEL, COMPREHENSIVE  -     CK  -     CBC WITH AUTOMATED DIFF  -     2D ECHO COMPLETE ADULT (TTE) W OR WO CONTR    7. Abnormal stress test  -     CARDIAC CATHETERIZATION; Future  -     METABOLIC PANEL, COMPREHENSIVE  -     CK  -     CBC WITH AUTOMATED DIFF  -     2D ECHO COMPLETE ADULT (TTE) W OR WO CONTR        ICD-10-CM ICD-9-CM    1. PERRY (dyspnea on exertion) R06.09 786.09 CARDIAC CATHETERIZATION      METABOLIC PANEL, COMPREHENSIVE      CK      CBC WITH AUTOMATED DIFF      2D ECHO COMPLETE ADULT (TTE) W OR WO CONTR   2. Essential hypertension I10 401.9 AMB POC EKG ROUTINE W/ 12 LEADS, INTER & REP      AMB POC EKG ROUTINE W/ 12 LEADS, INTER & REP      CARDIAC CATHETERIZATION      METABOLIC PANEL, COMPREHENSIVE      CK      CBC WITH AUTOMATED DIFF      2D ECHO COMPLETE ADULT (TTE) W OR WO CONTR   3. Type 2 diabetes mellitus with nephropathy (HCC) E11.21 250.40 CARDIAC CATHETERIZATION     367.73 METABOLIC PANEL, COMPREHENSIVE      CK      CBC WITH AUTOMATED DIFF      2D ECHO COMPLETE ADULT (TTE) W OR WO CONTR   4.  Pulmonary emphysema, unspecified emphysema type ([facility] J43.9 492.8 CARDIAC CATHETERIZATION      METABOLIC PANEL, COMPREHENSIVE      CK      CBC WITH AUTOMATED DIFF 2D ECHO COMPLETE ADULT (TTE) W OR WO CONTR   5. Obstructive sleep apnea (adult) (pediatric) G47.33 327.23 CARDIAC CATHETERIZATION      METABOLIC PANEL, COMPREHENSIVE      CK      CBC WITH AUTOMATED DIFF      2D ECHO COMPLETE ADULT (TTE) W OR WO CONTR   6. High cholesterol E78.00 272.0 CARDIAC CATHETERIZATION      METABOLIC PANEL, COMPREHENSIVE      CK      CBC WITH AUTOMATED DIFF      2D ECHO COMPLETE ADULT (TTE) W OR WO CONTR   7. Abnormal stress test R94.39 794.39 CARDIAC CATHETERIZATION      METABOLIC PANEL, COMPREHENSIVE      CK      CBC WITH AUTOMATED DIFF      2D ECHO COMPLETE ADULT (TTE) W OR WO CONTR     Orders Placed This Encounter    METABOLIC PANEL, COMPREHENSIVE    CK    CBC WITH AUTOMATED DIFF    CARDIAC CATHETERIZATION     Standing Status:   Future     Standing Expiration Date:   1/24/2019     Order Specific Question:   Reason for Exam:     Answer:   abnormal NST    AMB POC EKG ROUTINE W/ 12 LEADS, INTER & REP     Order Specific Question:   Reason for Exam:     Answer:   Routine    AMB POC EKG ROUTINE W/ 12 LEADS, INTER & REP     Order Specific Question:   Reason for Exam:     Answer:   Routine    2D ECHO COMPLETE ADULT (TTE) W OR WO CONTR     Order Specific Question:   Reason for Exam:     Answer:   PERRY     Order Specific Question:   Contrast Enhancement (Bubble Study, Definity, Optison) may be used if criteria listed in established evidence-based protocol has been identified. Answer: Yes    metFORMIN ER (GLUCOPHAGE XR) 500 mg tablet     Sig: TAKE ONE TABLET BY MOUTH ONE TIME DAILY     Refill:  0        Plan:     Patient is a 80-year-old male with a markedly abnormal stress test with dyspnea on exertion, multiple CAD risk factors. Currently on aspirin beta blocker statin therapy. Dyspnea on exertion consistent with angina functional class III discussed risk and benefits of coronary angiography with PTCA stenting. Patient understands and is willing to proceed with procedure.   He will Normal vision: sees adequately in most situations; can see medication labels, newsprint go to [employer] for precath labs, [employer] will contact patient tomorrow morning for date and time of procedure. Anticipate early next week. 1.  Hypertension: Mildly elevated, he did not take his a.m. medications this was the first reading of elevated blood pressure in [facility] in the last several months  2. Type 2 diabetes: He will need to hold metformin prior to cardiac cath this will be relayed from preprocedural instructions  3. Hyperlipidemia: On statin therapy continue  4. Dyspnea on exertion as anginal equivalent: Cardiac cath and will further evaluate structure with echocardiogram for function and pulmonary hypertension given COPD history/ [place]. Patient will follow up with Dr. Tete Ellington for cardiac catheterization as well as post-cath follow-up. Aliyah Grace NP    This note was created using voice recognition software. Despite editing, there may be syntax errors.

## 2022-03-05 NOTE — MR AVS SNAPSHOT
[address] 
[phone] Patient: Kay Escobedo MRN: [id number] Visit Information Date & Time Provider Department Dept. Phone Encounter #  
 8/14/2018  9:00 AM Darnell Pérez, [facility] [phone] [id number] Follow-up Instructions Return in about 1 year (around 8/14/2019). Your Appointments 8/20/2019  9:00 AM  
ESTABLISHED PATIENT with MD [facility]) Appt Note: annual f/u  
 [address]  
[phone] [address] Upcoming Health Maintenance Date Due COLONOSCOPY 2/19/1969 DTaP/Tdap/Td series (1 - Tdap) 2/19/1972 ZOSTER VACCINE AGE 60> 12/19/2010 EYE EXAM RETINAL OR DILATED Q1 7/1/2014 GLAUCOMA SCREENING Q2Y 2/19/2016 FOOT EXAM Q1 10/13/2016 MEDICARE YEARLY EXAM 3/14/2018 MICROALBUMIN Q1 6/21/2018 Influenza Age 5 to Adult 8/1/2018 HEMOGLOBIN A1C Q6M 9/28/2018 LIPID PANEL Q1 10/25/2018 Allergies as of 8/14/2018  Review Complete On: 8/14/2018 By: Darnell Pérez MD  
  
 Severity Noted Reaction Type Reactions Bactrim [Sulfamethoprim Ds]  12/03/2015    Diarrhea, Nausea and Vomiting After on 1st dose. Morphine  08/15/2011    Unknown (comments)  
 itching Current Immunizations  Reviewed on 10/25/2017 Name Date Influenza Vaccine 10/22/2014, 1/24/2014, 10/1/2012 Influenza Vaccine (Quad) PF 10/25/2017, 12/28/2016, 10/22/2015 Pneumococcal Polysaccharide (PPSV-23) 12/28/2016 Pneumococcal Vaccine (Unspecified Type) 1/1/2010 Not reviewed this visit You Were Diagnosed With   
  
 Codes Comments Essential hypertension    -  Primary ICD-10-CM: I10 
ICD-9-CM: 401.9 Obstructive sleep apnea (adult) (pediatric)     ICD-10-CM: G47.33 
ICD-9-CM: 327.23 Leg swelling     ICD-10-CM: M79.89 ICD-9-CM: 729.81 Mixed hyperlipidemia     ICD-10-CM: E78.2 ICD-9-CM: 272.2 Vitals BP Pulse Resp Height(growth percentile) Weight(growth percentile) SpO2  
 140/80 (BP 1 Location: Right arm, BP Patient Position: Sitting) 70 16 5' 9\" (1.753 m) 253 lb 3.2 oz (114.9 kg) 97% BMI Smoking Status 37.39 kg/m2 Former Smoker Vitals History BMI and BSA Data Body Mass Index Body Surface Area  
 37.39 kg/m 2 2.37 m 2 Preferred Pharmacy Pharmacy Name Phone [facility] [address] [facility], [address] [place] [address] [phone] Your Updated Medication List  
  
   
This list is accurate as of 8/14/18  9:36 AM.  Always use your most recent med list.  
  
  
  
  
 ADVAIR DISKUS 500-50 mcg/dose diskus inhaler Generic drug:  fluticasone-salmeterol Take 1 Puff by inhalation every twelve (12) hours. albuterol 2.5 mg /3 mL (0.083 %) nebulizer solution Commonly known as:  PROVENTIL VENTOLIN  
3 mL by Nebulization route every four (4) hours as needed for Wheezing. aspirin delayed-release 81 mg tablet Take  by mouth daily. ipratropium 0.02 % Soln Commonly known as:  ATROVENT  
2.5 mL by Nebulization route four (4) times daily as needed. levothyroxine 88 mcg tablet Commonly known as:  SYNTHROID  
TAKE ONE TABLET BY MOUTH EVERY MORNING BEFORE BREAKFAST  
  
 losartan 100 mg tablet Commonly known as:  COZAAR  
TAKE ONE TABLET BY MOUTH DAILY lovastatin 20 mg tablet Commonly known as:  MEVACOR Take 1 Tab by mouth nightly. metFORMIN  mg tablet Commonly known as:  GLUCOPHAGE XR  
TAKE ONE TABLET BY MOUTH ONE TIME DAILY . metoprolol tartrate 50 mg tablet Commonly known as:  LOPRESSOR  
TAKE ONE TABLET BY MOUTH TWICE A DAY  
  
 montelukast 10 mg tablet Commonly known as:  SINGULAIR Take 1 Tab by mouth daily. terazosin 1 mg capsule Commonly known as:  HYTRIN  
TAKE ONE CAPSULE BY MOUTH ONCE NIGHTLY VIAGRA 100 mg tablet Generic drug:  sildenafil citrate Take 1 Tab by mouth as needed. We Performed the Following AMB POC EKG ROUTINE W/ 12 LEADS, INTER & REP [45141 CPT(R)] Follow-up Instructions Return in about 1 year (around 8/14/2019). Introducing \[facility]\"" & HEALTH SERVICES! [facility] introduces wikifolio patient portal. Now you can access parts of your medical record, email your doctor's office, and request medication refills online. 1. In your internet browser, go to [email or web address] Ads 2. Click on the First Time User? Click Here link in the Sign In box. You will see the New Member Sign Up page. 3. Enter your wikif[employer]io Access Code exactly as it appears below. You will not need to use this code after youve completed the sign-up process. If you do not sign up before the expiration date, you must request a new code. · wikif[employer]io Access Code: [email or web address] Expires: 11/12/2018  9:35 AM 
 
4. Enter the last four digits of your Social Security Number (xxxx) and Date of Birth (mm/dd/yyyy) as indicated and click Submit. You will be taken to the next sign-up page. 5. Create a w[employer]if[employer]io ID. This will be your [employer] login ID and cannot be changed, so think of one that is secure and easy to remember. 6. Create a w[employer]io password. You can change your password at any time. 7. Enter your Password Reset Question and Answer. This can be used at a later time if you forget your password. 8. Enter your e-mail address. You will receive e-mail notification when new information is available in [address] 9. Click Sign Up. You can now view and download portions of your medical record. 10. Click the Download Summary menu link to download a portable copy of your medical information. If you have questions, please visit the Frequently Asked Questions section of the [employer] website. Remember, [employer] is NOT to be used for urgent needs. For medical emergencies, dial 911. Now available from your iPhone and Android! Please provide this summary of care documentation to your next provider. Your primary care clinician is listed as Terence Goltz. Saba Figueroa. If you have any questions after today's visit, please call [phone]. Review of Systems:  CONSTITUTIONAL: No fever, No diaphoresis  SKIN: No rash  ENT: No change in hearing, No sore throat, No neck pain, No rhinorrhea, No ear pain  RESPIRATORY: No shortness of breath, No cough  CARDIAC: No chest pain, No palpitations  GI: No abdominal pain, No nausea, No vomiting, No diarrhea  MUSCULOSKELETAL: No joint paint, No swelling, No back pain  NEUROLOGIC: No numbness, No focal weakness, No headache, No dizziness  All other systems negative, unless specified in HPI